# Patient Record
Sex: FEMALE | Race: WHITE | Employment: FULL TIME | URBAN - NONMETROPOLITAN AREA
[De-identification: names, ages, dates, MRNs, and addresses within clinical notes are randomized per-mention and may not be internally consistent; named-entity substitution may affect disease eponyms.]

---

## 2017-01-19 ENCOUNTER — HOSPITAL ENCOUNTER (OUTPATIENT)
Dept: OTHER | Age: 32
Discharge: HOME | End: 2017-01-19
Attending: CLINIC/CENTER | Admitting: CLINIC/CENTER

## 2017-01-21 LAB
CULTURE: NORMAL
REPORT STATUS: NORMAL
REQUEST PROBLEM: NORMAL
SPECIMEN: NORMAL

## 2017-03-06 ENCOUNTER — HOSPITAL ENCOUNTER (OUTPATIENT)
Dept: OTHER | Age: 32
Discharge: HOME | End: 2017-03-06
Attending: INTERNAL MEDICINE | Admitting: INTERNAL MEDICINE

## 2017-03-06 LAB
ALBUMIN SERPL-MCNC: 4.3 GM/DL (ref 3.4–5)
ALP BLD-CCNC: 58 IU/L (ref 40–129)
ALT SERPL-CCNC: 9 U/L (ref 10–40)
ANION GAP SERPL CALCULATED.3IONS-SCNC: 12 MMOL/L (ref 4–16)
AST SERPL-CCNC: 14 IU/L (ref 15–37)
BILIRUB SERPL-MCNC: 0.2 MG/DL (ref 0–1)
BUN BLDV-MCNC: 6 MG/DL (ref 6–23)
CALCIUM SERPL-MCNC: 8.8 MG/DL (ref 8.3–10.6)
CHLORIDE BLD-SCNC: 99 MMOL/L (ref 99–110)
CO2: 26 MMOL/L (ref 21–32)
CREAT SERPL-MCNC: 0.6 MG/DL (ref 0.6–1.1)
GFR AFRICAN AMERICAN: >60 ML/MIN/1.73M2
GFR NON-AFRICAN AMERICAN: >60 ML/MIN/1.73M2
GLUCOSE BLD-MCNC: 70 MG/DL (ref 70–140)
POTASSIUM SERPL-SCNC: 4 MMOL/L (ref 3.5–5.1)
SODIUM BLD-SCNC: 137 MMOL/L (ref 135–145)
T3 FREE: 2.6 PG/ML (ref 2.3–4.2)
T4 FREE: 1.4 NG/DL (ref 0.9–1.8)
TOTAL PROTEIN: 7.3 GM/DL (ref 6.4–8.2)
TSH HIGH SENSITIVITY: 0.85 UIU/ML (ref 0.27–4.2)

## 2017-03-08 LAB
ANTITHYROGLOBULIN AB: <0.9
T4 TOTAL: 9.7 UG/DL
THYROGLOBULIN BY LC-MS/MS, SERUM/PLASMA: ABNORMAL
THYROGLOBULIN: 0.3

## 2017-03-14 ENCOUNTER — HOSPITAL ENCOUNTER (OUTPATIENT)
Dept: ULTRASOUND IMAGING | Age: 32
Discharge: HOME | End: 2017-03-14
Attending: INTERNAL MEDICINE | Admitting: INTERNAL MEDICINE

## 2017-03-14 DIAGNOSIS — R59.0 INGUINAL LYMPHADENOPATHY: ICD-10-CM

## 2017-03-14 DIAGNOSIS — C73 MALIGNANT NEOPLASM OF THYROID GLAND (HCC): ICD-10-CM

## 2017-04-26 ENCOUNTER — HOSPITAL ENCOUNTER (OUTPATIENT)
Dept: ULTRASOUND IMAGING | Age: 32
Discharge: HOME | End: 2017-04-26
Attending: EMERGENCY MEDICINE | Admitting: EMERGENCY MEDICINE

## 2017-04-26 DIAGNOSIS — R10.11 ABDOMINAL PAIN, RIGHT UPPER QUADRANT: ICD-10-CM

## 2017-05-01 ENCOUNTER — HOSPITAL ENCOUNTER (OUTPATIENT)
Dept: OTHER | Age: 32
Discharge: HOME | End: 2017-05-01
Attending: RADIOLOGY | Admitting: RADIOLOGY

## 2017-05-01 LAB
GONADOTROPIN, CHORIONIC (HCG) QUANT: <0.5 UIU/ML
HCT VFR BLD CALC: 40.9 % (ref 37–47)
HEMOGLOBIN: 13.4 GM/DL (ref 12.5–16)
MCH RBC QN AUTO: 28.8 PG (ref 27–31)
MCHC RBC AUTO-ENTMCNC: 32.8 % (ref 32–36)
MCV RBC AUTO: 87.8 FL (ref 78–100)
PDW BLD-RTO: 15 % (ref 11.7–14.9)
PLATELET # BLD: 309 K/CU MM (ref 140–440)
PMV BLD AUTO: 11.5 FL (ref 7.5–11.1)
RBC # BLD: 4.66 M/CU MM (ref 4.2–5.4)
TSH HIGH SENSITIVITY: 118.6 UIU/ML (ref 0.27–4.2)
WBC # BLD: 8.2 K/CU MM (ref 4–10.5)

## 2017-05-02 LAB
ANTITHYROGLOBULIN AB: <0.9
THYROGLOBULIN BY LC-MS/MS, SERUM/PLASMA: NORMAL
THYROGLOBULIN: 2.9

## 2017-05-10 ENCOUNTER — HOSPITAL ENCOUNTER (OUTPATIENT)
Dept: NUCLEAR MEDICINE | Age: 32
Discharge: HOME | End: 2017-05-09
Attending: RADIOLOGY | Admitting: RADIOLOGY

## 2017-05-10 DIAGNOSIS — C73 MALIGNANT NEOPLASM OF THYROID GLAND (HCC): ICD-10-CM

## 2017-05-10 DIAGNOSIS — C73 THYROID CANCER (HCC): ICD-10-CM

## 2017-07-27 ENCOUNTER — HOSPITAL ENCOUNTER (OUTPATIENT)
Dept: OTHER | Age: 32
Discharge: HOME | End: 2017-07-27
Attending: RADIOLOGY | Admitting: RADIOLOGY

## 2017-07-30 LAB
Lab: NORMAL
TEST NAME: NORMAL

## 2017-12-19 ENCOUNTER — HOSPITAL ENCOUNTER (OUTPATIENT)
Dept: GENERAL RADIOLOGY | Age: 32
Discharge: HOME | End: 2017-12-19

## 2017-12-19 DIAGNOSIS — R05.9 COUGH: ICD-10-CM

## 2018-01-30 ENCOUNTER — HOSPITAL ENCOUNTER (OUTPATIENT)
Dept: CARDIAC REHAB | Age: 33
Discharge: HOME | End: 2018-01-30

## 2018-04-02 ENCOUNTER — HOSPITAL ENCOUNTER (OUTPATIENT)
Dept: OTHER | Age: 33
Discharge: HOME | End: 2018-04-02
Attending: INTERNAL MEDICINE | Admitting: INTERNAL MEDICINE

## 2018-04-02 LAB
ALBUMIN SERPL-MCNC: 4.5 GM/DL (ref 3.4–5)
ALP BLD-CCNC: 60 IU/L (ref 40–129)
ALT SERPL-CCNC: 11 U/L (ref 10–40)
ANION GAP SERPL CALCULATED.3IONS-SCNC: 7 MMOL/L (ref 4–16)
AST SERPL-CCNC: 18 IU/L (ref 15–37)
BILIRUB SERPL-MCNC: 0.1 MG/DL (ref 0–1)
BUN BLDV-MCNC: 8 MG/DL (ref 6–23)
CALCIUM SERPL-MCNC: 9.3 MG/DL (ref 8.3–10.6)
CHLORIDE BLD-SCNC: 102 MMOL/L (ref 99–110)
CO2: 32 MMOL/L (ref 21–32)
CREAT SERPL-MCNC: 0.6 MG/DL (ref 0.6–1.1)
DIFFERENTIAL TYPE: ABNORMAL
EOSINOPHILS ABSOLUTE: 0.5 K/CU MM
EOSINOPHILS RELATIVE PERCENT: 6 % (ref 0–3)
ERYTHROCYTE SEDIMENTATION RATE: 14 MM/HR (ref 0–20)
FERRITIN: 12 NG/ML (ref 15–150)
FOLATE: >20 NG/ML (ref 3.1–17.5)
GFR AFRICAN AMERICAN: >60 ML/MIN/1.73M2
GFR NON-AFRICAN AMERICAN: >60 ML/MIN/1.73M2
GLUCOSE BLD-MCNC: 90 MG/DL (ref 70–99)
HCT VFR BLD CALC: 40.3 % (ref 37–47)
HEMOGLOBIN: 13.4 GM/DL (ref 12.5–16)
IRON: 57 UG/DL (ref 37–145)
LACTATE DEHYDROGENASE: 161 IU/L (ref 120–246)
LYMPHOCYTES ABSOLUTE: 2.8 K/CU MM
LYMPHOCYTES RELATIVE PERCENT: 32 % (ref 24–44)
MCH RBC QN AUTO: 29.3 PG (ref 27–31)
MCHC RBC AUTO-ENTMCNC: 33.3 % (ref 32–36)
MCV RBC AUTO: 88 FL (ref 78–100)
MONOCYTES ABSOLUTE: 0.4 K/CU MM
MONOCYTES RELATIVE PERCENT: 5 % (ref 0–4)
PCT TRANSFERRIN: 14 % (ref 10–44)
PDW BLD-RTO: 12.8 % (ref 11.7–14.9)
PLATELET # BLD: 262 K/CU MM (ref 140–440)
PLT MORPHOLOGY: ABNORMAL
PMV BLD AUTO: 11.2 FL (ref 7.5–11.1)
POLYCHROMASIA: ABNORMAL
POTASSIUM SERPL-SCNC: 4.6 MMOL/L (ref 3.5–5.1)
RBC # BLD: 4.58 M/CU MM (ref 4.2–5.4)
RETICULOCYTE COUNT PCT: 0.9 % (ref 0.2–2.2)
SEGMENTED NEUTROPHILS ABSOLUTE COUNT: 4.9 K/CU MM
SEGMENTED NEUTROPHILS RELATIVE PERCENT: 57 % (ref 36–66)
SODIUM BLD-SCNC: 141 MMOL/L (ref 135–145)
TOTAL IRON BINDING CAPACITY: 403 UG/DL (ref 250–450)
TOTAL PROTEIN: 7.3 GM/DL (ref 6.4–8.2)
UNSATURATED IRON BINDING CAPACITY: 346 UG/DL (ref 110–370)
VITAMIN B-12: 414.4 PG/ML (ref 211–911)
WBC # BLD: 8.6 K/CU MM (ref 4–10.5)

## 2018-04-05 LAB — ANTI-NUCLEAR ANTIBODY (ANA): NORMAL

## 2018-04-23 ENCOUNTER — HOSPITAL ENCOUNTER (OUTPATIENT)
Dept: LAB | Age: 33
Discharge: HOME | End: 2018-04-23

## 2018-04-23 LAB — TSH HIGH SENSITIVITY: 2.29 UIU/ML (ref 0.27–4.2)

## 2018-06-27 ENCOUNTER — HOSPITAL ENCOUNTER (OUTPATIENT)
Dept: LAB | Age: 33
Discharge: HOME | End: 2018-06-27

## 2018-06-27 LAB
T4 FREE: 1.46 NG/DL (ref 0.9–1.8)
TSH HIGH SENSITIVITY: 0.69 UIU/ML (ref 0.27–4.2)

## 2018-07-23 ENCOUNTER — HOSPITAL ENCOUNTER (OUTPATIENT)
Dept: CARDIAC REHAB | Age: 33
Discharge: HOME | End: 2018-07-23

## 2018-08-20 ENCOUNTER — HOSPITAL ENCOUNTER (OUTPATIENT)
Dept: OTHER | Age: 33
Discharge: HOME | End: 2018-08-20
Attending: INTERNAL MEDICINE | Admitting: INTERNAL MEDICINE

## 2018-08-20 LAB
ALBUMIN SERPL-MCNC: 4.3 GM/DL (ref 3.4–5)
ALP BLD-CCNC: 55 IU/L (ref 40–129)
ALT SERPL-CCNC: 11 U/L (ref 10–40)
ANION GAP SERPL CALCULATED.3IONS-SCNC: 12 MMOL/L (ref 4–16)
AST SERPL-CCNC: 16 IU/L (ref 15–37)
BASOPHILS ABSOLUTE: 0.1 K/CU MM
BASOPHILS RELATIVE PERCENT: 1 % (ref 0–1)
BILIRUB SERPL-MCNC: 0.2 MG/DL (ref 0–1)
BUN BLDV-MCNC: 9 MG/DL (ref 6–23)
CALCIUM SERPL-MCNC: 9.4 MG/DL (ref 8.3–10.6)
CHLORIDE BLD-SCNC: 101 MMOL/L (ref 99–110)
CO2: 25 MMOL/L (ref 21–32)
CREAT SERPL-MCNC: 0.7 MG/DL (ref 0.6–1.1)
DIFFERENTIAL TYPE: ABNORMAL
EOSINOPHILS ABSOLUTE: 0.1 K/CU MM
EOSINOPHILS RELATIVE PERCENT: 1 % (ref 0–3)
FERRITIN: 8 NG/ML (ref 15–150)
GFR AFRICAN AMERICAN: >60 ML/MIN/1.73M2
GFR NON-AFRICAN AMERICAN: >60 ML/MIN/1.73M2
GLUCOSE BLD-MCNC: 50 MG/DL (ref 70–99)
HCT VFR BLD CALC: 40.5 % (ref 37–47)
HEMOGLOBIN: 12.6 GM/DL (ref 12.5–16)
IRON: 72 UG/DL (ref 37–145)
LYMPHOCYTES ABSOLUTE: 3.3 K/CU MM
LYMPHOCYTES RELATIVE PERCENT: 29 % (ref 24–44)
MCH RBC QN AUTO: 29.1 PG (ref 27–31)
MCHC RBC AUTO-ENTMCNC: 31.1 % (ref 32–36)
MCV RBC AUTO: 93.5 FL (ref 78–100)
MONOCYTES ABSOLUTE: 0.7 K/CU MM
MONOCYTES RELATIVE PERCENT: 6 % (ref 0–4)
MYELOCYTE PERCENT: 1 %
MYELOCYTES ABSOLUTE COUNT: 0.11 K/CU MM
PCT TRANSFERRIN: 18 % (ref 10–44)
PDW BLD-RTO: 12.9 % (ref 11.7–14.9)
PLATELET # BLD: 256 K/CU MM (ref 140–440)
PMV BLD AUTO: 11.9 FL (ref 7.5–11.1)
POTASSIUM SERPL-SCNC: 4.3 MMOL/L (ref 3.5–5.1)
RBC # BLD: 4.33 M/CU MM (ref 4.2–5.4)
RBC # BLD: ABNORMAL 10*6/UL
SEGMENTED NEUTROPHILS ABSOLUTE COUNT: 7.1 K/CU MM
SEGMENTED NEUTROPHILS RELATIVE PERCENT: 62 % (ref 36–66)
SODIUM BLD-SCNC: 138 MMOL/L (ref 135–145)
TOTAL IRON BINDING CAPACITY: 407 UG/DL (ref 250–450)
TOTAL PROTEIN: 6.8 GM/DL (ref 6.4–8.2)
UNSATURATED IRON BINDING CAPACITY: 335 UG/DL (ref 110–370)
WBC # BLD: 11.4 K/CU MM (ref 4–10.5)

## 2018-12-15 ENCOUNTER — APPOINTMENT (OUTPATIENT)
Dept: GENERAL RADIOLOGY | Age: 33
End: 2018-12-15
Payer: COMMERCIAL

## 2018-12-15 ENCOUNTER — HOSPITAL ENCOUNTER (EMERGENCY)
Age: 33
Discharge: HOME | End: 2018-12-15
Attending: EMERGENCY MEDICINE
Payer: COMMERCIAL

## 2018-12-15 VITALS
OXYGEN SATURATION: 100 % | DIASTOLIC BLOOD PRESSURE: 66 MMHG | WEIGHT: 115 LBS | HEART RATE: 80 BPM | BODY MASS INDEX: 21.71 KG/M2 | TEMPERATURE: 98.2 F | RESPIRATION RATE: 20 BRPM | HEIGHT: 61 IN | SYSTOLIC BLOOD PRESSURE: 151 MMHG

## 2018-12-15 DIAGNOSIS — R19.7 NAUSEA VOMITING AND DIARRHEA: Primary | ICD-10-CM

## 2018-12-15 DIAGNOSIS — R11.2 NAUSEA VOMITING AND DIARRHEA: Primary | ICD-10-CM

## 2018-12-15 DIAGNOSIS — R05.9 COUGH: ICD-10-CM

## 2018-12-15 LAB
ALBUMIN SERPL-MCNC: 4.3 GM/DL (ref 3.4–5)
ALP BLD-CCNC: 61 IU/L (ref 40–129)
ALT SERPL-CCNC: 8 U/L (ref 10–40)
ANION GAP SERPL CALCULATED.3IONS-SCNC: 14 MMOL/L (ref 4–16)
AST SERPL-CCNC: 19 IU/L (ref 15–37)
BACTERIA: NORMAL /HPF
BASOPHILS ABSOLUTE: 0.1 K/CU MM
BASOPHILS RELATIVE PERCENT: 0.5 % (ref 0–1)
BILIRUB SERPL-MCNC: 0.2 MG/DL (ref 0–1)
BILIRUBIN URINE: NEGATIVE MG/DL
BLOOD, URINE: NORMAL
BUN BLDV-MCNC: 8 MG/DL (ref 6–23)
CALCIUM SERPL-MCNC: 8.9 MG/DL (ref 8.3–10.6)
CAST TYPE: NEGATIVE /HPF
CHLORIDE BLD-SCNC: 102 MMOL/L (ref 99–110)
CLARITY: NORMAL
CO2: 25 MMOL/L (ref 21–32)
COLOR: NORMAL
CREAT SERPL-MCNC: 0.6 MG/DL (ref 0.6–1.1)
CRYSTAL TYPE: NEGATIVE /HPF
DIFFERENTIAL TYPE: ABNORMAL
EOSINOPHILS ABSOLUTE: 0.2 K/CU MM
EOSINOPHILS RELATIVE PERCENT: 2.3 % (ref 0–3)
EPITHELIAL CELLS, UA: NORMAL /HPF
GFR AFRICAN AMERICAN: >60 ML/MIN/1.73M2
GFR NON-AFRICAN AMERICAN: >60 ML/MIN/1.73M2
GLUCOSE BLD-MCNC: 76 MG/DL (ref 70–99)
GLUCOSE, URINE: NEGATIVE MG/DL
HCT VFR BLD CALC: 35 % (ref 37–47)
HEMOGLOBIN: 11.5 GM/DL (ref 12.5–16)
IMMATURE NEUTROPHIL %: 0.4 % (ref 0–0.43)
KETONES, URINE: NEGATIVE MG/DL
LEUKOCYTE ESTERASE, URINE: NEGATIVE
LIPASE: 29 IU/L (ref 13–60)
LYMPHOCYTES ABSOLUTE: 2.8 K/CU MM
LYMPHOCYTES RELATIVE PERCENT: 28.2 % (ref 24–44)
MCH RBC QN AUTO: 29 PG (ref 27–31)
MCHC RBC AUTO-ENTMCNC: 32.9 % (ref 32–36)
MCV RBC AUTO: 88.2 FL (ref 78–100)
MONOCYTES ABSOLUTE: 0.7 K/CU MM
MONOCYTES RELATIVE PERCENT: 6.8 % (ref 0–4)
NITRITE URINE, QUANTITATIVE: NEGATIVE
PDW BLD-RTO: 12.7 % (ref 11.7–14.9)
PH, URINE: 6
PLATELET # BLD: 181 K/CU MM (ref 140–440)
PMV BLD AUTO: 12.6 FL (ref 7.5–11.1)
POTASSIUM SERPL-SCNC: 4.1 MMOL/L (ref 3.5–5.1)
PREGNANCY, URINE: NEGATIVE
PROTEIN UA: NEGATIVE MG/DL
RBC # BLD: 3.97 M/CU MM (ref 4.2–5.4)
RBC URINE: NORMAL /HPF
SEGMENTED NEUTROPHILS ABSOLUTE COUNT: 6.1 K/CU MM
SEGMENTED NEUTROPHILS RELATIVE PERCENT: 61.8 % (ref 36–66)
SODIUM BLD-SCNC: 141 MMOL/L (ref 135–145)
SPECIFIC GRAVITY UA: 1.01
SPECIFIC GRAVITY, URINE: 1.01 (ref 1–1.03)
TOTAL IMMATURE NEUTOROPHIL: 0.04 K/CU MM
TOTAL PROTEIN: 7.2 GM/DL (ref 6.4–8.2)
UROBILINOGEN, URINE: 0.2 MG/DL
WBC # BLD: 9.9 K/CU MM (ref 4–10.5)
WBC UA: NEGATIVE /HPF

## 2018-12-15 PROCEDURE — 81025 URINE PREGNANCY TEST: CPT

## 2018-12-15 PROCEDURE — 80053 COMPREHEN METABOLIC PANEL: CPT

## 2018-12-15 PROCEDURE — 2580000003 HC RX 258: Performed by: EMERGENCY MEDICINE

## 2018-12-15 PROCEDURE — 83690 ASSAY OF LIPASE: CPT

## 2018-12-15 PROCEDURE — 96374 THER/PROPH/DIAG INJ IV PUSH: CPT

## 2018-12-15 PROCEDURE — 6360000002 HC RX W HCPCS: Performed by: EMERGENCY MEDICINE

## 2018-12-15 PROCEDURE — 71046 X-RAY EXAM CHEST 2 VIEWS: CPT

## 2018-12-15 PROCEDURE — 99284 EMERGENCY DEPT VISIT MOD MDM: CPT

## 2018-12-15 PROCEDURE — 85025 COMPLETE CBC W/AUTO DIFF WBC: CPT

## 2018-12-15 PROCEDURE — 81001 URINALYSIS AUTO W/SCOPE: CPT

## 2018-12-15 RX ORDER — FERROUS SULFATE 325(65) MG
325 TABLET ORAL
COMMUNITY
End: 2020-08-31 | Stop reason: SDUPTHER

## 2018-12-15 RX ORDER — ONDANSETRON 4 MG/1
4 TABLET, ORALLY DISINTEGRATING ORAL EVERY 8 HOURS PRN
Qty: 15 TABLET | Refills: 0 | Status: SHIPPED | OUTPATIENT
Start: 2018-12-15 | End: 2019-02-18

## 2018-12-15 RX ORDER — 0.9 % SODIUM CHLORIDE 0.9 %
1000 INTRAVENOUS SOLUTION INTRAVENOUS ONCE
Status: COMPLETED | OUTPATIENT
Start: 2018-12-15 | End: 2018-12-15

## 2018-12-15 RX ORDER — ONDANSETRON 2 MG/ML
4 INJECTION INTRAMUSCULAR; INTRAVENOUS EVERY 30 MIN PRN
Status: DISCONTINUED | OUTPATIENT
Start: 2018-12-15 | End: 2018-12-15 | Stop reason: HOSPADM

## 2018-12-15 RX ADMIN — SODIUM CHLORIDE 1000 ML: 9 INJECTION, SOLUTION INTRAVENOUS at 16:57

## 2018-12-15 RX ADMIN — ONDANSETRON 4 MG: 2 INJECTION INTRAMUSCULAR; INTRAVENOUS at 16:57

## 2018-12-15 ASSESSMENT — PAIN SCALES - GENERAL: PAINLEVEL_OUTOF10: 5

## 2018-12-15 ASSESSMENT — PAIN DESCRIPTION - PAIN TYPE: TYPE: ACUTE PAIN

## 2018-12-15 NOTE — ED PROVIDER NOTES
CHOLECYSTECTOMY      CYSTOSCOPY  2012    LAPAROSCOPY      x5- last one 2009    MYRINGOTOMY AND TYMPANOSTOMY TUBE PLACEMENT      as a child    THYROIDECTOMY  10/17/2016    total thyroidectomy     TUBAL LIGATION  2016    UTERINE SUSPENSION  2014     Family History   Problem Relation Age of Onset    Arthritis Mother     High Blood Pressure Mother     Arthritis Father     Mental Retardation Paternal Uncle     Arthritis Maternal Grandmother     Diabetes Maternal Grandmother     High Blood Pressure Maternal Grandmother     Arthritis Maternal Grandfather     Diabetes Maternal Grandfather     High Blood Pressure Maternal Grandfather     Arthritis Paternal Grandmother     Arthritis Paternal Grandfather      Social History     Social History    Marital status:      Spouse name: N/A    Number of children: N/A    Years of education: N/A     Occupational History    Not on file. Social History Main Topics    Smoking status: Current Every Day Smoker     Packs/day: 0.50     Years: 12.00     Types: Cigarettes    Smokeless tobacco: Never Used      Comment: \"trying to quit\"    Alcohol use No    Drug use: No    Sexual activity: Yes     Partners: Male     Other Topics Concern    Not on file     Social History Narrative    No narrative on file     No current facility-administered medications for this encounter.       Current Outpatient Prescriptions   Medication Sig Dispense Refill    ferrous sulfate 325 (65 Fe) MG tablet Take 325 mg by mouth daily (with breakfast)      levothyroxine (SYNTHROID) 112 MCG tablet Take 112 mcg by mouth Daily      Norgestimate-Eth Estradiol (SPRINTEC 28 PO) Take by mouth       Allergies   Allergen Reactions    Latex Swelling and Rash     Swelling and rash       Physical Exam:  ED TRIAGE VITALS  BP: (!) 151/66, Temp: 98.2 °F (36.8 °C), Pulse: 80, Resp: 20, SpO2: 100 %    GENERAL: Appears stated age, awake and alert, no acute distress, non-toxic appearing  HEENT: NC /

## 2018-12-21 ENCOUNTER — HOSPITAL ENCOUNTER (OUTPATIENT)
Age: 33
Setting detail: SPECIMEN
Discharge: HOME | End: 2018-12-21
Payer: COMMERCIAL

## 2018-12-21 LAB
ALBUMIN SERPL-MCNC: 4.6 GM/DL (ref 3.4–5)
ALP BLD-CCNC: 56 IU/L (ref 40–128)
ALT SERPL-CCNC: 15 U/L (ref 10–40)
ANION GAP SERPL CALCULATED.3IONS-SCNC: 12 MMOL/L (ref 4–16)
AST SERPL-CCNC: 19 IU/L (ref 15–37)
BILIRUB SERPL-MCNC: 0.2 MG/DL (ref 0–1)
BUN BLDV-MCNC: 7 MG/DL (ref 6–23)
CALCIUM SERPL-MCNC: 9.1 MG/DL (ref 8.3–10.6)
CHLORIDE BLD-SCNC: 101 MMOL/L (ref 99–110)
CO2: 25 MMOL/L (ref 21–32)
CREAT SERPL-MCNC: 0.7 MG/DL (ref 0.6–1.1)
FERRITIN: 13 NG/ML (ref 15–150)
GFR AFRICAN AMERICAN: >60 ML/MIN/1.73M2
GFR NON-AFRICAN AMERICAN: >60 ML/MIN/1.73M2
GLUCOSE BLD-MCNC: 117 MG/DL (ref 70–99)
IRON: 91 UG/DL (ref 37–145)
PCT TRANSFERRIN: 26 % (ref 10–44)
POTASSIUM SERPL-SCNC: 4.7 MMOL/L (ref 3.5–5.1)
SODIUM BLD-SCNC: 138 MMOL/L (ref 135–145)
TOTAL IRON BINDING CAPACITY: 345 UG/DL (ref 250–450)
TOTAL PROTEIN: 7.2 GM/DL (ref 6.4–8.2)
UNSATURATED IRON BINDING CAPACITY: 254 UG/DL (ref 110–370)

## 2018-12-21 PROCEDURE — 80053 COMPREHEN METABOLIC PANEL: CPT

## 2018-12-21 PROCEDURE — 83540 ASSAY OF IRON: CPT

## 2018-12-21 PROCEDURE — 82728 ASSAY OF FERRITIN: CPT

## 2018-12-21 PROCEDURE — 83550 IRON BINDING TEST: CPT

## 2019-02-18 ENCOUNTER — HOSPITAL ENCOUNTER (OUTPATIENT)
Age: 34
Setting detail: OBSERVATION
Discharge: HOME | End: 2019-02-20
Attending: EMERGENCY MEDICINE | Admitting: HOSPITALIST
Payer: COMMERCIAL

## 2019-02-18 DIAGNOSIS — K52.9 GASTROENTERITIS: Primary | ICD-10-CM

## 2019-02-18 DIAGNOSIS — E87.29 HIGH ANION GAP METABOLIC ACIDOSIS: ICD-10-CM

## 2019-02-18 PROBLEM — A08.4 VIRAL GASTROENTERITIS: Status: ACTIVE | Noted: 2019-02-18

## 2019-02-18 LAB
ALBUMIN SERPL-MCNC: 4 GM/DL (ref 3.4–5)
ALP BLD-CCNC: 50 IU/L (ref 40–129)
ALT SERPL-CCNC: 9 U/L (ref 10–40)
ANION GAP SERPL CALCULATED.3IONS-SCNC: 25 MMOL/L (ref 4–16)
AST SERPL-CCNC: 13 IU/L (ref 15–37)
BASOPHILS ABSOLUTE: 0 K/CU MM
BASOPHILS ABSOLUTE: 0.1 K/CU MM
BASOPHILS RELATIVE PERCENT: 0.2 % (ref 0–1)
BASOPHILS RELATIVE PERCENT: 0.2 % (ref 0–1)
BILIRUB SERPL-MCNC: 0.5 MG/DL (ref 0–1)
BUN BLDV-MCNC: 12 MG/DL (ref 6–23)
CALCIUM SERPL-MCNC: 8.9 MG/DL (ref 8.3–10.6)
CHLORIDE BLD-SCNC: 99 MMOL/L (ref 99–110)
CO2: 15 MMOL/L (ref 21–32)
CREAT SERPL-MCNC: 0.6 MG/DL (ref 0.6–1.1)
DIFFERENTIAL TYPE: ABNORMAL
DIFFERENTIAL TYPE: ABNORMAL
EOSINOPHILS ABSOLUTE: 0 K/CU MM
EOSINOPHILS ABSOLUTE: 0 K/CU MM
EOSINOPHILS RELATIVE PERCENT: 0.1 % (ref 0–3)
EOSINOPHILS RELATIVE PERCENT: 0.2 % (ref 0–3)
GFR AFRICAN AMERICAN: >60 ML/MIN/1.73M2
GFR NON-AFRICAN AMERICAN: >60 ML/MIN/1.73M2
GLUCOSE BLD-MCNC: 138 MG/DL (ref 70–99)
HCT VFR BLD CALC: 38.1 % (ref 37–47)
HCT VFR BLD CALC: 41.5 % (ref 37–47)
HEMOGLOBIN: 12.7 GM/DL (ref 12.5–16)
HEMOGLOBIN: 13.3 GM/DL (ref 12.5–16)
IMMATURE NEUTROPHIL %: 0.4 % (ref 0–0.43)
IMMATURE NEUTROPHIL %: 0.6 % (ref 0–0.43)
LIPASE: 38 IU/L (ref 13–60)
LYMPHOCYTES ABSOLUTE: 0.9 K/CU MM
LYMPHOCYTES ABSOLUTE: 1.3 K/CU MM
LYMPHOCYTES RELATIVE PERCENT: 4.3 % (ref 24–44)
LYMPHOCYTES RELATIVE PERCENT: 5.9 % (ref 24–44)
MCH RBC QN AUTO: 28.5 PG (ref 27–31)
MCH RBC QN AUTO: 29.1 PG (ref 27–31)
MCHC RBC AUTO-ENTMCNC: 32 % (ref 32–36)
MCHC RBC AUTO-ENTMCNC: 33.3 % (ref 32–36)
MCV RBC AUTO: 87.4 FL (ref 78–100)
MCV RBC AUTO: 88.9 FL (ref 78–100)
MONOCYTES ABSOLUTE: 0.3 K/CU MM
MONOCYTES ABSOLUTE: 0.7 K/CU MM
MONOCYTES RELATIVE PERCENT: 1.5 % (ref 0–4)
MONOCYTES RELATIVE PERCENT: 3.2 % (ref 0–4)
PDW BLD-RTO: 13.1 % (ref 11.7–14.9)
PDW BLD-RTO: 13.2 % (ref 11.7–14.9)
PLATELET # BLD: 242 K/CU MM (ref 140–440)
PLATELET # BLD: 287 K/CU MM (ref 140–440)
PMV BLD AUTO: 10.9 FL (ref 7.5–11.1)
PMV BLD AUTO: 11.7 FL (ref 7.5–11.1)
POTASSIUM SERPL-SCNC: 4.3 MMOL/L (ref 3.5–5.1)
RBC # BLD: 4.36 M/CU MM (ref 4.2–5.4)
RBC # BLD: 4.67 M/CU MM (ref 4.2–5.4)
SEGMENTED NEUTROPHILS ABSOLUTE COUNT: 18.6 K/CU MM
SEGMENTED NEUTROPHILS ABSOLUTE COUNT: 19.4 K/CU MM
SEGMENTED NEUTROPHILS RELATIVE PERCENT: 89.9 % (ref 36–66)
SEGMENTED NEUTROPHILS RELATIVE PERCENT: 93.5 % (ref 36–66)
SODIUM BLD-SCNC: 139 MMOL/L (ref 135–145)
TOTAL IMMATURE NEUTOROPHIL: 0.07 K/CU MM
TOTAL IMMATURE NEUTOROPHIL: 0.13 K/CU MM
TOTAL PROTEIN: 7.6 GM/DL (ref 6.4–8.2)
WBC # BLD: 19.9 K/CU MM (ref 4–10.5)
WBC # BLD: 21.6 K/CU MM (ref 4–10.5)

## 2019-02-18 PROCEDURE — 6360000002 HC RX W HCPCS: Performed by: EMERGENCY MEDICINE

## 2019-02-18 PROCEDURE — 80053 COMPREHEN METABOLIC PANEL: CPT

## 2019-02-18 PROCEDURE — 85025 COMPLETE CBC W/AUTO DIFF WBC: CPT

## 2019-02-18 PROCEDURE — 99284 EMERGENCY DEPT VISIT MOD MDM: CPT

## 2019-02-18 PROCEDURE — 83690 ASSAY OF LIPASE: CPT

## 2019-02-18 PROCEDURE — G0378 HOSPITAL OBSERVATION PER HR: HCPCS

## 2019-02-18 PROCEDURE — 96375 TX/PRO/DX INJ NEW DRUG ADDON: CPT

## 2019-02-18 PROCEDURE — 2580000003 HC RX 258: Performed by: EMERGENCY MEDICINE

## 2019-02-18 PROCEDURE — 2580000003 HC RX 258: Performed by: HOSPITALIST

## 2019-02-18 PROCEDURE — 96376 TX/PRO/DX INJ SAME DRUG ADON: CPT

## 2019-02-18 PROCEDURE — 96374 THER/PROPH/DIAG INJ IV PUSH: CPT

## 2019-02-18 PROCEDURE — 36415 COLL VENOUS BLD VENIPUNCTURE: CPT

## 2019-02-18 RX ORDER — NICOTINE 21 MG/24HR
1 PATCH, TRANSDERMAL 24 HOURS TRANSDERMAL DAILY
Status: DISCONTINUED | OUTPATIENT
Start: 2019-02-19 | End: 2019-02-20 | Stop reason: HOSPADM

## 2019-02-18 RX ORDER — SODIUM CHLORIDE, SODIUM LACTATE, POTASSIUM CHLORIDE, CALCIUM CHLORIDE 600; 310; 30; 20 MG/100ML; MG/100ML; MG/100ML; MG/100ML
INJECTION, SOLUTION INTRAVENOUS CONTINUOUS
Status: DISCONTINUED | OUTPATIENT
Start: 2019-02-18 | End: 2019-02-20 | Stop reason: HOSPADM

## 2019-02-18 RX ORDER — SODIUM CHLORIDE, SODIUM LACTATE, POTASSIUM CHLORIDE, CALCIUM CHLORIDE 600; 310; 30; 20 MG/100ML; MG/100ML; MG/100ML; MG/100ML
INJECTION, SOLUTION INTRAVENOUS ONCE
Status: COMPLETED | OUTPATIENT
Start: 2019-02-18 | End: 2019-02-18

## 2019-02-18 RX ORDER — ONDANSETRON 4 MG/1
4 TABLET, ORALLY DISINTEGRATING ORAL EVERY 6 HOURS PRN
Status: DISCONTINUED | OUTPATIENT
Start: 2019-02-18 | End: 2019-02-20 | Stop reason: HOSPADM

## 2019-02-18 RX ORDER — POTASSIUM CHLORIDE 7.45 MG/ML
10 INJECTION INTRAVENOUS
Status: DISCONTINUED | OUTPATIENT
Start: 2019-02-18 | End: 2019-02-18

## 2019-02-18 RX ORDER — METOCLOPRAMIDE HYDROCHLORIDE 5 MG/ML
10 INJECTION INTRAMUSCULAR; INTRAVENOUS ONCE
Status: COMPLETED | OUTPATIENT
Start: 2019-02-18 | End: 2019-02-18

## 2019-02-18 RX ORDER — ONDANSETRON 2 MG/ML
4 INJECTION INTRAMUSCULAR; INTRAVENOUS EVERY 6 HOURS PRN
Status: DISCONTINUED | OUTPATIENT
Start: 2019-02-18 | End: 2019-02-20 | Stop reason: HOSPADM

## 2019-02-18 RX ADMIN — SODIUM CHLORIDE, POTASSIUM CHLORIDE, SODIUM LACTATE AND CALCIUM CHLORIDE: 600; 310; 30; 20 INJECTION, SOLUTION INTRAVENOUS at 17:23

## 2019-02-18 RX ADMIN — SODIUM CHLORIDE, POTASSIUM CHLORIDE, SODIUM LACTATE AND CALCIUM CHLORIDE: 600; 310; 30; 20 INJECTION, SOLUTION INTRAVENOUS at 14:06

## 2019-02-18 RX ADMIN — METOCLOPRAMIDE 10 MG: 5 INJECTION, SOLUTION INTRAMUSCULAR; INTRAVENOUS at 14:07

## 2019-02-18 RX ADMIN — ONDANSETRON 4 MG: 2 INJECTION INTRAMUSCULAR; INTRAVENOUS at 14:07

## 2019-02-18 RX ADMIN — SODIUM CHLORIDE, POTASSIUM CHLORIDE, SODIUM LACTATE AND CALCIUM CHLORIDE 1000 ML: 600; 310; 30; 20 INJECTION, SOLUTION INTRAVENOUS at 14:55

## 2019-02-18 RX ADMIN — ONDANSETRON 4 MG: 2 INJECTION INTRAMUSCULAR; INTRAVENOUS at 17:22

## 2019-02-18 ASSESSMENT — ENCOUNTER SYMPTOMS
VOMITING: 1
NAUSEA: 1
DIARRHEA: 1
ABDOMINAL PAIN: 1

## 2019-02-18 ASSESSMENT — PAIN DESCRIPTION - FREQUENCY
FREQUENCY: INTERMITTENT
FREQUENCY: INTERMITTENT

## 2019-02-18 ASSESSMENT — PAIN DESCRIPTION - LOCATION
LOCATION: ABDOMEN
LOCATION: ABDOMEN

## 2019-02-18 ASSESSMENT — PAIN SCALES - GENERAL
PAINLEVEL_OUTOF10: 0
PAINLEVEL_OUTOF10: 7
PAINLEVEL_OUTOF10: 10

## 2019-02-18 ASSESSMENT — PAIN DESCRIPTION - DESCRIPTORS
DESCRIPTORS: STABBING
DESCRIPTORS: STABBING

## 2019-02-19 ENCOUNTER — APPOINTMENT (OUTPATIENT)
Dept: CT IMAGING | Age: 34
End: 2019-02-19
Payer: COMMERCIAL

## 2019-02-19 LAB
ANION GAP SERPL CALCULATED.3IONS-SCNC: 12 MMOL/L (ref 4–16)
BUN BLDV-MCNC: 7 MG/DL (ref 6–23)
CALCIUM SERPL-MCNC: 8 MG/DL (ref 8.3–10.6)
CHLORIDE BLD-SCNC: 102 MMOL/L (ref 99–110)
CO2: 23 MMOL/L (ref 21–32)
CREAT SERPL-MCNC: 0.6 MG/DL (ref 0.6–1.1)
GFR AFRICAN AMERICAN: >60 ML/MIN/1.73M2
GFR NON-AFRICAN AMERICAN: >60 ML/MIN/1.73M2
GLUCOSE BLD-MCNC: 93 MG/DL (ref 70–99)
POTASSIUM SERPL-SCNC: 3.6 MMOL/L (ref 3.5–5.1)
SODIUM BLD-SCNC: 137 MMOL/L (ref 135–145)

## 2019-02-19 PROCEDURE — 96365 THER/PROPH/DIAG IV INF INIT: CPT

## 2019-02-19 PROCEDURE — 87329 GIARDIA AG IA: CPT

## 2019-02-19 PROCEDURE — 96375 TX/PRO/DX INJ NEW DRUG ADDON: CPT

## 2019-02-19 PROCEDURE — 2500000003 HC RX 250 WO HCPCS: Performed by: FAMILY MEDICINE

## 2019-02-19 PROCEDURE — 2580000003 HC RX 258: Performed by: HOSPITALIST

## 2019-02-19 PROCEDURE — C9113 INJ PANTOPRAZOLE SODIUM, VIA: HCPCS | Performed by: FAMILY MEDICINE

## 2019-02-19 PROCEDURE — G0378 HOSPITAL OBSERVATION PER HR: HCPCS

## 2019-02-19 PROCEDURE — 6360000004 HC RX CONTRAST MEDICATION: Performed by: HOSPITALIST

## 2019-02-19 PROCEDURE — 96367 TX/PROPH/DG ADDL SEQ IV INF: CPT

## 2019-02-19 PROCEDURE — 6360000002 HC RX W HCPCS: Performed by: FAMILY MEDICINE

## 2019-02-19 PROCEDURE — 80048 BASIC METABOLIC PNL TOTAL CA: CPT

## 2019-02-19 PROCEDURE — 6360000002 HC RX W HCPCS: Performed by: EMERGENCY MEDICINE

## 2019-02-19 PROCEDURE — 87324 CLOSTRIDIUM AG IA: CPT

## 2019-02-19 PROCEDURE — 87328 CRYPTOSPORIDIUM AG IA: CPT

## 2019-02-19 PROCEDURE — 96376 TX/PRO/DX INJ SAME DRUG ADON: CPT

## 2019-02-19 PROCEDURE — 74177 CT ABD & PELVIS W/CONTRAST: CPT

## 2019-02-19 PROCEDURE — 6370000000 HC RX 637 (ALT 250 FOR IP): Performed by: FAMILY MEDICINE

## 2019-02-19 PROCEDURE — 36415 COLL VENOUS BLD VENIPUNCTURE: CPT

## 2019-02-19 PROCEDURE — 83630 LACTOFERRIN FECAL (QUAL): CPT

## 2019-02-19 PROCEDURE — 87425 ROTAVIRUS AG IA: CPT

## 2019-02-19 RX ORDER — PROMETHAZINE HYDROCHLORIDE 25 MG/ML
12.5 INJECTION, SOLUTION INTRAMUSCULAR; INTRAVENOUS EVERY 6 HOURS PRN
Status: DISCONTINUED | OUTPATIENT
Start: 2019-02-19 | End: 2019-02-20 | Stop reason: HOSPADM

## 2019-02-19 RX ORDER — CIPROFLOXACIN 2 MG/ML
400 INJECTION, SOLUTION INTRAVENOUS EVERY 12 HOURS
Status: DISCONTINUED | OUTPATIENT
Start: 2019-02-19 | End: 2019-02-20 | Stop reason: HOSPADM

## 2019-02-19 RX ORDER — PANTOPRAZOLE SODIUM 40 MG/10ML
40 INJECTION, POWDER, LYOPHILIZED, FOR SOLUTION INTRAVENOUS DAILY
Status: DISCONTINUED | OUTPATIENT
Start: 2019-02-19 | End: 2019-02-20 | Stop reason: HOSPADM

## 2019-02-19 RX ORDER — SIMETHICONE 80 MG
80 TABLET,CHEWABLE ORAL EVERY 6 HOURS PRN
Status: DISCONTINUED | OUTPATIENT
Start: 2019-02-19 | End: 2019-02-20 | Stop reason: HOSPADM

## 2019-02-19 RX ADMIN — PANTOPRAZOLE SODIUM 40 MG: 40 INJECTION, POWDER, FOR SOLUTION INTRAVENOUS at 16:23

## 2019-02-19 RX ADMIN — ONDANSETRON 4 MG: 2 INJECTION INTRAMUSCULAR; INTRAVENOUS at 07:37

## 2019-02-19 RX ADMIN — ONDANSETRON 4 MG: 2 INJECTION INTRAMUSCULAR; INTRAVENOUS at 00:07

## 2019-02-19 RX ADMIN — SODIUM CHLORIDE, POTASSIUM CHLORIDE, SODIUM LACTATE AND CALCIUM CHLORIDE: 600; 310; 30; 20 INJECTION, SOLUTION INTRAVENOUS at 01:58

## 2019-02-19 RX ADMIN — SODIUM CHLORIDE, POTASSIUM CHLORIDE, SODIUM LACTATE AND CALCIUM CHLORIDE: 600; 310; 30; 20 INJECTION, SOLUTION INTRAVENOUS at 20:25

## 2019-02-19 RX ADMIN — SIMETHICONE CHEW TAB 80 MG 80 MG: 80 TABLET ORAL at 09:42

## 2019-02-19 RX ADMIN — SODIUM CHLORIDE, POTASSIUM CHLORIDE, SODIUM LACTATE AND CALCIUM CHLORIDE: 600; 310; 30; 20 INJECTION, SOLUTION INTRAVENOUS at 09:42

## 2019-02-19 RX ADMIN — PROMETHAZINE HYDROCHLORIDE 12.5 MG: 25 INJECTION INTRAMUSCULAR; INTRAVENOUS at 17:06

## 2019-02-19 RX ADMIN — CIPROFLOXACIN 400 MG: 2 INJECTION, SOLUTION INTRAVENOUS at 18:34

## 2019-02-19 RX ADMIN — SIMETHICONE CHEW TAB 80 MG 80 MG: 80 TABLET ORAL at 16:23

## 2019-02-19 RX ADMIN — ONDANSETRON 4 MG: 2 INJECTION INTRAMUSCULAR; INTRAVENOUS at 13:56

## 2019-02-19 RX ADMIN — ONDANSETRON 4 MG: 2 INJECTION INTRAMUSCULAR; INTRAVENOUS at 20:19

## 2019-02-19 RX ADMIN — METRONIDAZOLE 500 MG: 500 INJECTION, SOLUTION INTRAVENOUS at 16:23

## 2019-02-19 RX ADMIN — IOPAMIDOL 75 ML: 755 INJECTION, SOLUTION INTRAVENOUS at 13:13

## 2019-02-19 RX ADMIN — PROMETHAZINE HYDROCHLORIDE 12.5 MG: 25 INJECTION INTRAMUSCULAR; INTRAVENOUS at 10:49

## 2019-02-19 ASSESSMENT — PAIN SCALES - GENERAL
PAINLEVEL_OUTOF10: 3
PAINLEVEL_OUTOF10: 4
PAINLEVEL_OUTOF10: 3
PAINLEVEL_OUTOF10: 3
PAINLEVEL_OUTOF10: 0
PAINLEVEL_OUTOF10: 3
PAINLEVEL_OUTOF10: 3
PAINLEVEL_OUTOF10: 2

## 2019-02-19 ASSESSMENT — PAIN DESCRIPTION - LOCATION
LOCATION: ABDOMEN
LOCATION: ABDOMEN

## 2019-02-19 ASSESSMENT — PAIN DESCRIPTION - DESCRIPTORS
DESCRIPTORS: CRAMPING
DESCRIPTORS: CRAMPING

## 2019-02-19 ASSESSMENT — PAIN DESCRIPTION - FREQUENCY
FREQUENCY: INTERMITTENT
FREQUENCY: INTERMITTENT

## 2019-02-20 VITALS
DIASTOLIC BLOOD PRESSURE: 62 MMHG | TEMPERATURE: 97.8 F | BODY MASS INDEX: 21.39 KG/M2 | WEIGHT: 125.3 LBS | SYSTOLIC BLOOD PRESSURE: 130 MMHG | HEART RATE: 50 BPM | OXYGEN SATURATION: 99 % | RESPIRATION RATE: 16 BRPM | HEIGHT: 64 IN

## 2019-02-20 PROBLEM — K52.9 GASTROENTERITIS: Status: RESOLVED | Noted: 2019-02-20 | Resolved: 2019-02-20

## 2019-02-20 PROBLEM — A08.4 VIRAL GASTROENTERITIS: Status: RESOLVED | Noted: 2019-02-18 | Resolved: 2019-02-20

## 2019-02-20 PROBLEM — K52.9 GASTROENTERITIS: Status: ACTIVE | Noted: 2019-02-20

## 2019-02-20 LAB
ANION GAP SERPL CALCULATED.3IONS-SCNC: 4 MMOL/L (ref 4–16)
BUN BLDV-MCNC: 6 MG/DL (ref 6–23)
CALCIUM SERPL-MCNC: 8 MG/DL (ref 8.3–10.6)
CHLORIDE BLD-SCNC: 100 MMOL/L (ref 99–110)
CO2: 34 MMOL/L (ref 21–32)
CREAT SERPL-MCNC: 0.6 MG/DL (ref 0.6–1.1)
CRYPTOSPORIDIUM ANTIGEN: NEGATIVE
GFR AFRICAN AMERICAN: >60 ML/MIN/1.73M2
GFR NON-AFRICAN AMERICAN: >60 ML/MIN/1.73M2
GIARDIA ANTIGEN STOOL: NEGATIVE
GLUCOSE BLD-MCNC: 86 MG/DL (ref 70–99)
HCT VFR BLD CALC: 34.7 % (ref 37–47)
HEMOGLOBIN: 11 GM/DL (ref 12.5–16)
HIGH SENSITIVE C-REACTIVE PROTEIN: 19.9 MG/L
LACTOFERRIN, QUAL: POSITIVE
MCH RBC QN AUTO: 28.5 PG (ref 27–31)
MCHC RBC AUTO-ENTMCNC: 31.7 % (ref 32–36)
MCV RBC AUTO: 89.9 FL (ref 78–100)
PDW BLD-RTO: 13.2 % (ref 11.7–14.9)
PLATELET # BLD: 226 K/CU MM (ref 140–440)
PMV BLD AUTO: 11 FL (ref 7.5–11.1)
POTASSIUM SERPL-SCNC: 3.2 MMOL/L (ref 3.5–5.1)
RBC # BLD: 3.86 M/CU MM (ref 4.2–5.4)
REASON FOR REJECTION: NORMAL
REJECTED TEST: NORMAL
ROTAVIRUS ANTIGEN: NEGATIVE
SODIUM BLD-SCNC: 138 MMOL/L (ref 135–145)
SOURCE: NORMAL
WBC # BLD: 6.9 K/CU MM (ref 4–10.5)

## 2019-02-20 PROCEDURE — 80048 BASIC METABOLIC PNL TOTAL CA: CPT

## 2019-02-20 PROCEDURE — 85027 COMPLETE CBC AUTOMATED: CPT

## 2019-02-20 PROCEDURE — 6360000002 HC RX W HCPCS: Performed by: EMERGENCY MEDICINE

## 2019-02-20 PROCEDURE — 2580000003 HC RX 258: Performed by: HOSPITALIST

## 2019-02-20 PROCEDURE — 6360000002 HC RX W HCPCS: Performed by: FAMILY MEDICINE

## 2019-02-20 PROCEDURE — 6370000000 HC RX 637 (ALT 250 FOR IP): Performed by: HOSPITALIST

## 2019-02-20 PROCEDURE — 36415 COLL VENOUS BLD VENIPUNCTURE: CPT

## 2019-02-20 PROCEDURE — 86141 C-REACTIVE PROTEIN HS: CPT

## 2019-02-20 PROCEDURE — G0378 HOSPITAL OBSERVATION PER HR: HCPCS

## 2019-02-20 PROCEDURE — 6370000000 HC RX 637 (ALT 250 FOR IP): Performed by: FAMILY MEDICINE

## 2019-02-20 PROCEDURE — 2500000003 HC RX 250 WO HCPCS: Performed by: FAMILY MEDICINE

## 2019-02-20 PROCEDURE — 96366 THER/PROPH/DIAG IV INF ADDON: CPT

## 2019-02-20 PROCEDURE — 96376 TX/PRO/DX INJ SAME DRUG ADON: CPT

## 2019-02-20 RX ORDER — FLUCONAZOLE 200 MG/1
200 TABLET ORAL DAILY
Qty: 3 TABLET | Refills: 0 | Status: SHIPPED | OUTPATIENT
Start: 2019-02-20 | End: 2019-02-23

## 2019-02-20 RX ORDER — SIMETHICONE 80 MG
80 TABLET,CHEWABLE ORAL EVERY 6 HOURS PRN
Qty: 180 TABLET | Refills: 0 | COMMUNITY
Start: 2019-02-20 | End: 2019-03-12 | Stop reason: ALTCHOICE

## 2019-02-20 RX ORDER — METRONIDAZOLE 500 MG/1
500 TABLET ORAL 3 TIMES DAILY
Qty: 30 TABLET | Refills: 0 | Status: SHIPPED | OUTPATIENT
Start: 2019-02-20 | End: 2019-03-02

## 2019-02-20 RX ORDER — CIPROFLOXACIN 500 MG/1
500 TABLET, FILM COATED ORAL 2 TIMES DAILY
Qty: 20 TABLET | Refills: 0 | Status: SHIPPED | OUTPATIENT
Start: 2019-02-20 | End: 2019-03-02

## 2019-02-20 RX ORDER — ONDANSETRON 4 MG/1
4 TABLET, ORALLY DISINTEGRATING ORAL EVERY 6 HOURS PRN
Qty: 20 TABLET | Refills: 0 | Status: SHIPPED | OUTPATIENT
Start: 2019-02-20 | End: 2019-03-12 | Stop reason: ALTCHOICE

## 2019-02-20 RX ADMIN — PROMETHAZINE HYDROCHLORIDE 12.5 MG: 25 INJECTION INTRAMUSCULAR; INTRAVENOUS at 05:44

## 2019-02-20 RX ADMIN — SODIUM CHLORIDE, POTASSIUM CHLORIDE, SODIUM LACTATE AND CALCIUM CHLORIDE: 600; 310; 30; 20 INJECTION, SOLUTION INTRAVENOUS at 04:09

## 2019-02-20 RX ADMIN — CIPROFLOXACIN 400 MG: 2 INJECTION, SOLUTION INTRAVENOUS at 05:44

## 2019-02-20 RX ADMIN — SIMETHICONE CHEW TAB 80 MG 80 MG: 80 TABLET ORAL at 00:01

## 2019-02-20 RX ADMIN — ONDANSETRON 4 MG: 4 TABLET, ORALLY DISINTEGRATING ORAL at 09:55

## 2019-02-20 RX ADMIN — PROMETHAZINE HYDROCHLORIDE 12.5 MG: 25 INJECTION INTRAMUSCULAR; INTRAVENOUS at 00:01

## 2019-02-20 RX ADMIN — METRONIDAZOLE 500 MG: 500 INJECTION, SOLUTION INTRAVENOUS at 00:01

## 2019-02-20 RX ADMIN — ONDANSETRON 4 MG: 2 INJECTION INTRAMUSCULAR; INTRAVENOUS at 03:03

## 2019-02-20 RX ADMIN — SIMETHICONE CHEW TAB 80 MG 80 MG: 80 TABLET ORAL at 05:44

## 2019-03-12 ENCOUNTER — TELEPHONE (OUTPATIENT)
Dept: FAMILY MEDICINE CLINIC | Age: 34
End: 2019-03-12

## 2019-03-12 ENCOUNTER — OFFICE VISIT (OUTPATIENT)
Dept: FAMILY MEDICINE CLINIC | Age: 34
End: 2019-03-12
Payer: COMMERCIAL

## 2019-03-12 VITALS
RESPIRATION RATE: 16 BRPM | HEART RATE: 80 BPM | DIASTOLIC BLOOD PRESSURE: 84 MMHG | HEIGHT: 62 IN | WEIGHT: 123.4 LBS | SYSTOLIC BLOOD PRESSURE: 124 MMHG | BODY MASS INDEX: 22.71 KG/M2

## 2019-03-12 DIAGNOSIS — E89.0 POSTOPERATIVE HYPOTHYROIDISM: ICD-10-CM

## 2019-03-12 DIAGNOSIS — G47.9 SLEEP DIFFICULTIES: ICD-10-CM

## 2019-03-12 DIAGNOSIS — B37.9 CANDIDIASIS: ICD-10-CM

## 2019-03-12 DIAGNOSIS — E89.0 POSTOPERATIVE HYPOTHYROIDISM: Primary | ICD-10-CM

## 2019-03-12 DIAGNOSIS — M79.10 MYALGIA: ICD-10-CM

## 2019-03-12 LAB — TSH REFLEX FT4: 0.8 UIU/ML (ref 0.27–4.2)

## 2019-03-12 PROCEDURE — 4004F PT TOBACCO SCREEN RCVD TLK: CPT | Performed by: PHYSICIAN ASSISTANT

## 2019-03-12 PROCEDURE — G8420 CALC BMI NORM PARAMETERS: HCPCS | Performed by: PHYSICIAN ASSISTANT

## 2019-03-12 PROCEDURE — 99203 OFFICE O/P NEW LOW 30 MIN: CPT | Performed by: PHYSICIAN ASSISTANT

## 2019-03-12 PROCEDURE — G8427 DOCREV CUR MEDS BY ELIG CLIN: HCPCS | Performed by: PHYSICIAN ASSISTANT

## 2019-03-12 PROCEDURE — G8484 FLU IMMUNIZE NO ADMIN: HCPCS | Performed by: PHYSICIAN ASSISTANT

## 2019-03-12 RX ORDER — DIPHENHYDRAMINE HCL 25 MG
100 TABLET ORAL
COMMUNITY

## 2019-03-12 RX ORDER — FLUCONAZOLE 100 MG/1
TABLET ORAL
Qty: 15 TABLET | Refills: 0 | Status: SHIPPED | OUTPATIENT
Start: 2019-03-12 | End: 2019-03-20

## 2019-03-12 RX ORDER — DULOXETIN HYDROCHLORIDE 30 MG/1
30 CAPSULE, DELAYED RELEASE ORAL DAILY
Qty: 30 CAPSULE | Refills: 3 | Status: SHIPPED | OUTPATIENT
Start: 2019-03-12 | End: 2020-08-18

## 2019-03-12 RX ORDER — LEVOTHYROXINE SODIUM 0.1 MG/1
100 TABLET ORAL DAILY
COMMUNITY
Start: 2019-02-14

## 2019-03-12 RX ORDER — TRAMADOL HYDROCHLORIDE 50 MG/1
50 TABLET ORAL
COMMUNITY
End: 2020-08-18

## 2019-03-12 RX ORDER — TRAMADOL HYDROCHLORIDE 50 MG/1
50 TABLET ORAL
Status: CANCELLED | OUTPATIENT
Start: 2019-03-12

## 2019-03-12 ASSESSMENT — PATIENT HEALTH QUESTIONNAIRE - PHQ9
SUM OF ALL RESPONSES TO PHQ QUESTIONS 1-9: 0
SUM OF ALL RESPONSES TO PHQ QUESTIONS 1-9: 0
2. FEELING DOWN, DEPRESSED OR HOPELESS: 0
SUM OF ALL RESPONSES TO PHQ9 QUESTIONS 1 & 2: 0
1. LITTLE INTEREST OR PLEASURE IN DOING THINGS: 0

## 2019-03-12 NOTE — TELEPHONE ENCOUNTER
Pharmacy called to confirm quantity of dosing for fluconazole 100mg. Order reads for two tablets taken today and one daily times 14 days with # 15 dispensed. Quantity clarified to match the given order with # 16 tablets  to be given. Verbal noted by pharmacist on phone by this nurse.

## 2019-03-13 PROBLEM — M79.10 MYALGIA: Status: ACTIVE | Noted: 2019-03-13

## 2019-03-13 PROBLEM — G47.9 SLEEP DIFFICULTIES: Status: ACTIVE | Noted: 2019-03-13

## 2019-03-13 PROBLEM — B37.9 CANDIDIASIS: Status: ACTIVE | Noted: 2019-03-13

## 2019-03-13 PROBLEM — E89.0 POSTOPERATIVE HYPOTHYROIDISM: Status: ACTIVE | Noted: 2019-03-13

## 2019-03-13 ASSESSMENT — ENCOUNTER SYMPTOMS
SHORTNESS OF BREATH: 0
COLOR CHANGE: 0
TROUBLE SWALLOWING: 0
NAUSEA: 0
VOMITING: 0
ABDOMINAL PAIN: 0
CHEST TIGHTNESS: 0
DIARRHEA: 0
COUGH: 0

## 2019-04-22 ENCOUNTER — HOSPITAL ENCOUNTER (OUTPATIENT)
Age: 34
Setting detail: SPECIMEN
Discharge: HOME | End: 2019-04-22
Payer: COMMERCIAL

## 2019-04-22 LAB
ALBUMIN SERPL-MCNC: 4.5 GM/DL (ref 3.4–5)
ALP BLD-CCNC: 56 IU/L (ref 40–129)
ALT SERPL-CCNC: 14 U/L (ref 10–40)
ANION GAP SERPL CALCULATED.3IONS-SCNC: 9 MMOL/L (ref 4–16)
AST SERPL-CCNC: 20 IU/L (ref 15–37)
BILIRUB SERPL-MCNC: 0.2 MG/DL (ref 0–1)
BUN BLDV-MCNC: 6 MG/DL (ref 6–23)
CALCIUM SERPL-MCNC: 9.1 MG/DL (ref 8.3–10.6)
CHLORIDE BLD-SCNC: 103 MMOL/L (ref 99–110)
CO2: 26 MMOL/L (ref 21–32)
CREAT SERPL-MCNC: 0.6 MG/DL (ref 0.6–1.1)
FERRITIN: 13 NG/ML (ref 15–150)
GFR AFRICAN AMERICAN: >60 ML/MIN/1.73M2
GFR NON-AFRICAN AMERICAN: >60 ML/MIN/1.73M2
GLUCOSE BLD-MCNC: 99 MG/DL (ref 70–99)
IRON: 37 UG/DL (ref 37–145)
PCT TRANSFERRIN: 10 % (ref 10–44)
POTASSIUM SERPL-SCNC: 3.9 MMOL/L (ref 3.5–5.1)
SODIUM BLD-SCNC: 138 MMOL/L (ref 135–145)
TOTAL IRON BINDING CAPACITY: 362 UG/DL (ref 250–450)
TOTAL PROTEIN: 7 GM/DL (ref 6.4–8.2)
UNSATURATED IRON BINDING CAPACITY: 325 UG/DL (ref 110–370)

## 2019-04-22 PROCEDURE — 83550 IRON BINDING TEST: CPT

## 2019-04-22 PROCEDURE — 83540 ASSAY OF IRON: CPT

## 2019-04-22 PROCEDURE — 80053 COMPREHEN METABOLIC PANEL: CPT

## 2019-04-22 PROCEDURE — 82728 ASSAY OF FERRITIN: CPT

## 2019-06-14 ENCOUNTER — HOSPITAL ENCOUNTER (OUTPATIENT)
Age: 34
Discharge: HOME | End: 2019-06-14
Payer: COMMERCIAL

## 2019-06-14 LAB
T4 FREE: 1.39 NG/DL (ref 0.9–1.8)
TSH HIGH SENSITIVITY: 4.75 UIU/ML (ref 0.27–4.2)

## 2019-06-14 PROCEDURE — 84439 ASSAY OF FREE THYROXINE: CPT

## 2019-06-14 PROCEDURE — 84443 ASSAY THYROID STIM HORMONE: CPT

## 2019-06-14 PROCEDURE — 36415 COLL VENOUS BLD VENIPUNCTURE: CPT

## 2019-08-26 ENCOUNTER — HOSPITAL ENCOUNTER (OUTPATIENT)
Age: 34
Discharge: HOME | End: 2019-08-26
Payer: COMMERCIAL

## 2019-08-26 LAB — TSH HIGH SENSITIVITY: 10.53 UIU/ML (ref 0.27–4.2)

## 2019-08-26 PROCEDURE — 36415 COLL VENOUS BLD VENIPUNCTURE: CPT

## 2019-08-26 PROCEDURE — 84443 ASSAY THYROID STIM HORMONE: CPT

## 2019-10-22 ENCOUNTER — HOSPITAL ENCOUNTER (OUTPATIENT)
Age: 34
Setting detail: SPECIMEN
Discharge: HOME | End: 2019-10-22
Payer: COMMERCIAL

## 2019-10-22 LAB
ALBUMIN SERPL-MCNC: 4.1 GM/DL (ref 3.4–5)
ALP BLD-CCNC: 43 IU/L (ref 40–128)
ALT SERPL-CCNC: 8 U/L (ref 10–40)
ANION GAP SERPL CALCULATED.3IONS-SCNC: 12 MMOL/L (ref 4–16)
AST SERPL-CCNC: 13 IU/L (ref 15–37)
BILIRUB SERPL-MCNC: 0.2 MG/DL (ref 0–1)
BUN BLDV-MCNC: 9 MG/DL (ref 6–23)
CALCIUM SERPL-MCNC: 8.8 MG/DL (ref 8.3–10.6)
CHLORIDE BLD-SCNC: 104 MMOL/L (ref 99–110)
CO2: 24 MMOL/L (ref 21–32)
CREAT SERPL-MCNC: 0.6 MG/DL (ref 0.6–1.1)
FERRITIN: 15 NG/ML (ref 15–150)
GFR AFRICAN AMERICAN: >60 ML/MIN/1.73M2
GFR NON-AFRICAN AMERICAN: >60 ML/MIN/1.73M2
GLUCOSE BLD-MCNC: 79 MG/DL (ref 70–99)
IRON: 124 UG/DL (ref 37–145)
PCT TRANSFERRIN: 34 % (ref 10–44)
POTASSIUM SERPL-SCNC: 4.2 MMOL/L (ref 3.5–5.1)
SODIUM BLD-SCNC: 140 MMOL/L (ref 135–145)
TOTAL IRON BINDING CAPACITY: 366 UG/DL (ref 250–450)
TOTAL PROTEIN: 6.6 GM/DL (ref 6.4–8.2)
UNSATURATED IRON BINDING CAPACITY: 242 UG/DL (ref 110–370)

## 2019-10-22 PROCEDURE — 83540 ASSAY OF IRON: CPT

## 2019-10-22 PROCEDURE — 80053 COMPREHEN METABOLIC PANEL: CPT

## 2019-10-22 PROCEDURE — 83550 IRON BINDING TEST: CPT

## 2019-10-22 PROCEDURE — 82728 ASSAY OF FERRITIN: CPT

## 2020-01-13 ENCOUNTER — HOSPITAL ENCOUNTER (OUTPATIENT)
Age: 35
Discharge: HOME | End: 2020-01-13
Payer: COMMERCIAL

## 2020-01-13 LAB
T4 FREE: 1.32 NG/DL (ref 0.9–1.8)
TSH HIGH SENSITIVITY: 3.96 UIU/ML (ref 0.27–4.2)

## 2020-01-13 PROCEDURE — 36415 COLL VENOUS BLD VENIPUNCTURE: CPT

## 2020-01-13 PROCEDURE — 84443 ASSAY THYROID STIM HORMONE: CPT

## 2020-01-13 PROCEDURE — 84439 ASSAY OF FREE THYROXINE: CPT

## 2020-04-16 ENCOUNTER — HOSPITAL ENCOUNTER (OUTPATIENT)
Age: 35
Discharge: HOME | End: 2020-04-16
Payer: COMMERCIAL

## 2020-04-16 LAB
T3 FREE: 3.3 PG/ML (ref 2.3–4.2)
T4 FREE: 1.54 NG/DL (ref 0.9–1.8)
TSH HIGH SENSITIVITY: 0.47 UIU/ML (ref 0.27–4.2)

## 2020-04-16 PROCEDURE — 84439 ASSAY OF FREE THYROXINE: CPT

## 2020-04-16 PROCEDURE — 84443 ASSAY THYROID STIM HORMONE: CPT

## 2020-04-16 PROCEDURE — 36415 COLL VENOUS BLD VENIPUNCTURE: CPT

## 2020-04-16 PROCEDURE — 84481 FREE ASSAY (FT-3): CPT

## 2020-04-16 PROCEDURE — 86800 THYROGLOBULIN ANTIBODY: CPT

## 2020-04-16 PROCEDURE — 84432 ASSAY OF THYROGLOBULIN: CPT

## 2020-04-17 LAB
ANTITHYROGLOBULIN AB: <0.9 IU/ML (ref 0–4)
ANTITHYROGLOBULIN AB: ABNORMAL IU/ML (ref 0–4)
THYROGLOBULIN BY LC-MS/MS, SERUM/PLASMA: ABNORMAL NG/ML (ref 1.3–31.8)
THYROGLOBULIN BY LC-MS/MS, SERUM/PLASMA: ABNORMAL NG/ML (ref 1.3–31.8)
THYROGLOBULIN: 0.3 NG/ML (ref 1.3–31.8)
THYROGLOBULIN: ABNORMAL NG/ML (ref 1.3–31.8)

## 2020-04-30 PROBLEM — K90.9 INTESTINAL MALABSORPTION, UNSPECIFIED: Status: ACTIVE | Noted: 2020-04-30

## 2020-04-30 PROBLEM — R59.0 LOCALIZED ENLARGED LYMPH NODES: Status: ACTIVE | Noted: 2020-04-30

## 2020-04-30 PROBLEM — C73 MALIGNANT NEOPLASM OF THYROID GLAND (HCC): Status: ACTIVE | Noted: 2020-04-30

## 2020-04-30 PROBLEM — D50.0 IRON DEFICIENCY ANEMIA SECONDARY TO BLOOD LOSS (CHRONIC): Status: ACTIVE | Noted: 2020-04-30

## 2020-04-30 PROBLEM — D64.9 ANEMIA, UNSPECIFIED: Status: ACTIVE | Noted: 2020-04-30

## 2020-07-13 ENCOUNTER — HOSPITAL ENCOUNTER (OUTPATIENT)
Age: 35
Discharge: HOME | End: 2020-07-13
Payer: COMMERCIAL

## 2020-07-13 LAB
T3 FREE: 3.3 PG/ML (ref 2.3–4.2)
T4 FREE: 1.43 NG/DL (ref 0.9–1.8)
TSH HIGH SENSITIVITY: 0.06 UIU/ML (ref 0.27–4.2)

## 2020-07-13 PROCEDURE — 36415 COLL VENOUS BLD VENIPUNCTURE: CPT

## 2020-07-13 PROCEDURE — 84481 FREE ASSAY (FT-3): CPT

## 2020-07-13 PROCEDURE — 84443 ASSAY THYROID STIM HORMONE: CPT

## 2020-07-13 PROCEDURE — 84439 ASSAY OF FREE THYROXINE: CPT

## 2020-08-07 ENCOUNTER — HOSPITAL ENCOUNTER (OUTPATIENT)
Age: 35
Discharge: HOME | End: 2020-08-07
Payer: COMMERCIAL

## 2020-08-07 LAB
T4 FREE: 1.34 NG/DL (ref 0.9–1.8)
TSH HIGH SENSITIVITY: 0.26 UIU/ML (ref 0.27–4.2)

## 2020-08-07 PROCEDURE — 36415 COLL VENOUS BLD VENIPUNCTURE: CPT

## 2020-08-07 PROCEDURE — 84439 ASSAY OF FREE THYROXINE: CPT

## 2020-08-07 PROCEDURE — 84443 ASSAY THYROID STIM HORMONE: CPT

## 2020-08-18 ENCOUNTER — HOSPITAL ENCOUNTER (EMERGENCY)
Age: 35
Discharge: HOME | End: 2020-08-18
Attending: EMERGENCY MEDICINE
Payer: COMMERCIAL

## 2020-08-18 VITALS
TEMPERATURE: 98.6 F | SYSTOLIC BLOOD PRESSURE: 122 MMHG | BODY MASS INDEX: 22.66 KG/M2 | OXYGEN SATURATION: 96 % | HEART RATE: 74 BPM | HEIGHT: 61 IN | RESPIRATION RATE: 16 BRPM | WEIGHT: 120 LBS | DIASTOLIC BLOOD PRESSURE: 57 MMHG

## 2020-08-18 PROCEDURE — 99283 EMERGENCY DEPT VISIT LOW MDM: CPT

## 2020-08-18 RX ORDER — LIOTHYRONINE SODIUM 5 UG/1
5 TABLET ORAL 2 TIMES DAILY
COMMUNITY
Start: 2020-04-21

## 2020-08-18 RX ORDER — CHLORHEXIDINE GLUCONATE 0.12 MG/ML
15 RINSE ORAL 2 TIMES DAILY
Qty: 420 ML | Refills: 0 | Status: SHIPPED | OUTPATIENT
Start: 2020-08-18 | End: 2020-09-01

## 2020-08-18 ASSESSMENT — PAIN DESCRIPTION - LOCATION: LOCATION: TEETH;FACE

## 2020-08-18 ASSESSMENT — PAIN SCALES - GENERAL: PAINLEVEL_OUTOF10: 7

## 2020-08-18 ASSESSMENT — PAIN DESCRIPTION - DESCRIPTORS: DESCRIPTORS: ACHING

## 2020-08-18 ASSESSMENT — PAIN DESCRIPTION - PROGRESSION: CLINICAL_PROGRESSION: GRADUALLY WORSENING

## 2020-08-18 ASSESSMENT — PAIN DESCRIPTION - ORIENTATION: ORIENTATION: RIGHT

## 2020-08-18 ASSESSMENT — PAIN DESCRIPTION - PAIN TYPE: TYPE: ACUTE PAIN

## 2020-08-18 ASSESSMENT — PAIN DESCRIPTION - ONSET: ONSET: ON-GOING

## 2020-08-18 ASSESSMENT — PAIN DESCRIPTION - FREQUENCY: FREQUENCY: CONTINUOUS

## 2020-08-18 NOTE — ED PROVIDER NOTES
Triage Chief Complaint:   Dental Pain (Ate popcorn on Friday, thought she had kernel stuck, is now having pain and pressure in upper right side of mouth. Dentist won't see her because she told them she had a low grade fever) and Oral Swelling    [place]:  Fernando Castro is a 30 y.o. female that presents to the ED complaint of tooth pain. States she ate some popcorn without a kernel got stuck. She called her dentist to be seen they want to see her she had a low-grade fever. No pain on mastication no hot or cold intolerance denies any drainage or bleeding.     Past Medical History:   Diagnosis Date    Cancer [facility])     path report from thyroid bx- + papillary carcinoma 2016- tx with radioactive iodine and surgery\"- follows with Dr Ramona Tucker Enlarged thyroid     for bx 2016    Heart abnormality     heart murmur    Kidney stone     \"use to see Dr Brook Harris, last kidney stone over a year ago, started approx 4 yrs ago- passed some in past\"    Murmur, heart     \"told I had this with pregnancy in , no echo, do have occ palpitations when lay on right side\"    PONV (postoperative nausea and vomiting)     denies any motion sickness     Past Surgical History:   Procedure Laterality Date    ABDOMEN SURGERY      uterine suspension,      SECTION      CHOLECYSTECTOMY      CYSTOSCOPY  2012    LAPAROSCOPY      x5- last one 2009    MYRINGOTOMY [address]      as a child    THYROIDECTOMY  10/17/2016    total thyroidectomy     TUBAL LIGATION      UTERINE SUSPENSION       Family History   Problem Relation Age of Onset    Arthritis Mother     High Blood Pressure Mother     Other Mother         lupus & fibromyalgia    Arthritis Father     Mental Retardation Paternal Uncle     Arthritis Maternal Grandmother     Diabetes Maternal Grandmother     High Blood Pressure Maternal Grandmother     Other Maternal Grandmother         fibromyalgia    Arthritis Maternal Grandfather     Diabetes Maternal Grandfather     High Blood Pressure Maternal Grandfather     Arthritis Paternal Grandmother     Heart Attack Paternal Grandmother     Arthritis Paternal Grandfather     Heart Attack Paternal Grandfather      Social History     Socioeconomic History    Marital status:      Spouse name: Not on file    Number of children: Not on file    Years of education: Not on file    Highest education level: Not on file   Occupational History    Not on file   Social Needs    Financial resource strain: Not on file    Food insecurity     Worry: Not on file     Inability: Not on file    Transportation needs     Medical: Not on file     Non-medical: Not on file   Tobacco Use    Smoking status: Current Every Day Smoker     Packs/day: 0.50     Years: 12.00     Pack years: 6.00     Types: Cigarettes    Smokeless tobacco: Never Used    Tobacco comment: \"trying to quit\"   Substance and Sexual Activity    Alcohol use: No    Drug use: No    Sexual activity: Yes     Partners: Male   Lifestyle    Physical activity     Days per week: Not on file     Minutes per session: Not on file    Stress: Not on file   Relationships    Social connections     Talks on phone: Not on file     Gets together: Not on file     Attends Muslim service: Not on file     Active member of club or organization: Not on file     Attends meetings of clubs or organizations: Not on file     Relationship status: Not on file    Intimate partner violence     Fear of current or ex partner: Not on file     Emotionally abused: Not on file     Physically abused: Not on file     Forced sexual activity: Not on file   Other Topics Concern    Not on file   Social History Narrative    Not on file     No current facility-administered medications for this encounter.       Current Outpatient Medications   Medication Sig Dispense Refill    liothyronine (CYTOMEL) 5 MCG tablet Take 5 mcg by mouth 2 times daily      chlorhexidine (PERIDEX) 0.12 % solution Take 15 mLs by mouth 2 times daily for 14 days 420 mL 0    diphenhydrAMINE (BENADRYL) 25 MG tablet Take 100 mg by mouth      ferrous sulfate 325 (65 Fe) MG tablet Take 325 mg by mouth daily (with breakfast)      Norgestimate-Eth Estradiol (SPRINTEC 28 PO) Take by mouth      levothyroxine (SYNTHROID) 100 MCG tablet Take 100 mcg by mouth Daily        Allergies   Allergen Reactions    Latex Swelling and Rash     Swelling and rash    Ciprofloxacin      Right arm went numb; heart rate racing (was give cipro/flagyl combo at hospital)         ROS:    Review of Systems   Constitutional: Positive for fever. HENT: Positive for dental problem. All other systems reviewed and are negative. Nursing Notes Reviewed    Physical Exam:  ED Triage Vitals   Enc Vitals Group      BP       Pulse       Resp       Temp       Temp src       SpO2       Weight       Height       Head Circumference       Peak Flow       Pain Score       Pain Loc       Pain Edu? Excl. in [address]? Physical Exam  Vitals signs and nursing note reviewed. Exam conducted with a chaperone present. Constitutional:       Appearance: She is well-developed. HENT:      Head: Normocephalic and atraumatic. Mouth/Throat:      Lips: Pink. Mouth: Mucous membranes are moist.      Dentition: Abnormal dentition. Does not have dentures. Dental tenderness present. No gingival swelling, dental caries, dental abscesses or gum lesions. Pharynx: Oropharynx is clear. Eyes:      Pupils: Pupils are equal, round, and reactive to light. Neck:      Musculoskeletal: Normal range of motion and neck supple. Musculoskeletal: Normal range of motion. Skin:     General: Skin is warm and dry. Neurological:      Mental Status: She is alert and oriented to person, place, and time.          I have reviewed and interpreted all of the currently available lab results from this visit (ifapplicable):  No results found for this visit on 08/18/20. Radiographs (if obtained):  [] The following radiograph wasinterpreted by myself in the absence of a radiologist:   [] Radiologist's Report Reviewed:  No orders to display         EKG (if obtained): (All EKG's are interpreted by myself in the absence of a cardiologist)    Chart review shows recent radiographs:  No results found. MDM:      Patient's dentition overall are in good shape. There is no decay no caries no fracture no signs of infection gingiva are normal.  She has mild percussion tenderness to the right first maxillary molar. I use dental floss and was able to floss the central teeth as well as the anterior aspect of the first molar I could not go posterior. This is uncomfortable because a trans-amount of bleeding. I recommend follow-up with the referral dentist that I gave her to she will be given a Peridex rinse. Gentle brushing and flexion should continue           Clinical Impression:  1. Odontalgia      Disposition referral (if applicable):    Call LISTED DENTIST  Schedule an appointment as soon as possible for a visit in 1 day      Disposition medications (if applicable):  Discharge Medication List as of 8/18/2020  3:55 PM      START taking these medications    Details   chlorhexidine (PERIDEX) 0.12 % solution Take 15 mLs by mouth 2 times daily for 14 days, Disp-420 mL,R-0Print                 Manolo Whitehead DO, FACEP      Comment: Please note this report has been produced using speech recognition software and maycontain errors related to that system including errors in grammar, punctuation, and spelling, as well as words and phrases that may be inappropriate. If there are any questions or concerns please feel free to contact thedictating provider for clarification.         Michelle Mitchell DO  08/18/20 8 [facility], DO  08/24/20 [id number]

## 2020-08-18 NOTE — ED NOTES
Pt discharged with instructions and prescriptions. Discussed when and how to take medications and pt stated understanding.   Pt walked out of the [id number], RN  08/18/20 [id number]

## 2020-08-31 ENCOUNTER — OFFICE VISIT (OUTPATIENT)
Dept: ONCOLOGY | Age: 35
End: 2020-08-31
Payer: COMMERCIAL

## 2020-08-31 ENCOUNTER — HOSPITAL ENCOUNTER (OUTPATIENT)
Dept: INFUSION THERAPY | Age: 35
Discharge: HOME | End: 2020-08-31
Payer: COMMERCIAL

## 2020-08-31 VITALS
OXYGEN SATURATION: 99 % | SYSTOLIC BLOOD PRESSURE: 115 MMHG | TEMPERATURE: 98.1 F | WEIGHT: 123 LBS | HEART RATE: 67 BPM | HEIGHT: 61 IN | BODY MASS INDEX: 23.22 KG/M2 | DIASTOLIC BLOOD PRESSURE: 55 MMHG | RESPIRATION RATE: 18 BRPM

## 2020-08-31 DIAGNOSIS — D50.0 IRON DEFICIENCY ANEMIA SECONDARY TO BLOOD LOSS (CHRONIC): ICD-10-CM

## 2020-08-31 LAB
ALBUMIN SERPL-MCNC: 4.1 GM/DL (ref 3.4–5)
ALP BLD-CCNC: 59 IU/L (ref 40–128)
ALT SERPL-CCNC: 10 U/L (ref 10–40)
ANION GAP SERPL CALCULATED.3IONS-SCNC: 9 MMOL/L (ref 4–16)
AST SERPL-CCNC: 12 IU/L (ref 15–37)
BASOPHILS ABSOLUTE: 0 K/CU MM
BASOPHILS RELATIVE PERCENT: 0.2 % (ref 0–1)
BILIRUB SERPL-MCNC: 0.3 MG/DL (ref 0–1)
BUN BLDV-MCNC: 8 MG/DL (ref 6–23)
CALCIUM SERPL-MCNC: 8.8 MG/DL (ref 8.3–10.6)
CHLORIDE BLD-SCNC: 103 MMOL/L (ref 99–110)
CO2: 25 MMOL/L (ref 21–32)
CREAT SERPL-MCNC: 0.6 MG/DL (ref 0.6–1.1)
DIFFERENTIAL TYPE: ABNORMAL
EOSINOPHILS ABSOLUTE: 0.3 K/CU MM
EOSINOPHILS RELATIVE PERCENT: 3.9 % (ref 0–3)
FERRITIN: 18 NG/ML (ref 15–150)
GFR AFRICAN AMERICAN: >60 ML/MIN/1.73M2
GFR NON-AFRICAN AMERICAN: >60 ML/MIN/1.73M2
GLUCOSE BLD-MCNC: 106 MG/DL (ref 70–99)
HCT VFR BLD CALC: 35.9 % (ref 37–47)
HEMOGLOBIN: 11.9 GM/DL (ref 12.5–16)
IRON: 168 UG/DL (ref 37–145)
LYMPHOCYTES ABSOLUTE: 1.8 K/CU MM
LYMPHOCYTES RELATIVE PERCENT: 22.9 % (ref 24–44)
MCH RBC QN AUTO: 28.8 PG (ref 27–31)
MCHC RBC AUTO-ENTMCNC: 33.1 % (ref 32–36)
MCV RBC AUTO: 86.9 FL (ref 78–100)
MONOCYTES ABSOLUTE: 0.6 K/CU MM
MONOCYTES RELATIVE PERCENT: 7 % (ref 0–4)
PCT TRANSFERRIN: 45 % (ref 10–44)
PDW BLD-RTO: 13.7 % (ref 11.7–14.9)
PLATELET # BLD: 206 K/CU MM (ref 140–440)
PMV BLD AUTO: 11.3 FL (ref 7.5–11.1)
POTASSIUM SERPL-SCNC: 3.9 MMOL/L (ref 3.5–5.1)
RBC # BLD: 4.13 M/CU MM (ref 4.2–5.4)
SEGMENTED NEUTROPHILS ABSOLUTE COUNT: 5.3 K/CU MM
SEGMENTED NEUTROPHILS RELATIVE PERCENT: 66 % (ref 36–66)
SODIUM BLD-SCNC: 137 MMOL/L (ref 135–145)
TOTAL IRON BINDING CAPACITY: 374 UG/DL (ref 250–450)
TOTAL PROTEIN: 6.4 GM/DL (ref 6.4–8.2)
UNSATURATED IRON BINDING CAPACITY: 206 UG/DL (ref 110–370)
WBC # BLD: 8 K/CU MM (ref 4–10.5)

## 2020-08-31 PROCEDURE — G8420 CALC BMI NORM PARAMETERS: HCPCS | Performed by: INTERNAL MEDICINE

## 2020-08-31 PROCEDURE — 83550 IRON BINDING TEST: CPT

## 2020-08-31 PROCEDURE — 82728 ASSAY OF FERRITIN: CPT

## 2020-08-31 PROCEDURE — 4004F PT TOBACCO SCREEN RCVD TLK: CPT | Performed by: INTERNAL MEDICINE

## 2020-08-31 PROCEDURE — 80053 COMPREHEN METABOLIC PANEL: CPT

## 2020-08-31 PROCEDURE — 85025 COMPLETE CBC W/AUTO DIFF WBC: CPT

## 2020-08-31 PROCEDURE — 36415 COLL VENOUS BLD VENIPUNCTURE: CPT

## 2020-08-31 PROCEDURE — 83540 ASSAY OF IRON: CPT

## 2020-08-31 PROCEDURE — G0463 HOSPITAL OUTPT CLINIC VISIT: HCPCS

## 2020-08-31 PROCEDURE — 99213 OFFICE O/P EST LOW 20 MIN: CPT | Performed by: INTERNAL MEDICINE

## 2020-08-31 PROCEDURE — G8427 DOCREV CUR MEDS BY ELIG CLIN: HCPCS | Performed by: INTERNAL MEDICINE

## 2020-08-31 PROCEDURE — 99211 OFF/OP EST MAY X REQ PHY/QHP: CPT

## 2020-08-31 RX ORDER — PREDNISONE 50 MG/1
TABLET ORAL
COMMUNITY
Start: 2020-08-18 | End: 2021-03-25

## 2020-08-31 RX ORDER — DEXAMETHASONE 4 MG/1
TABLET ORAL
COMMUNITY
Start: 2020-08-19 | End: 2021-03-25

## 2020-08-31 RX ORDER — FERROUS SULFATE 325(65) MG
325 TABLET ORAL
Qty: 90 TABLET | Refills: 3 | Status: SHIPPED | OUTPATIENT
Start: 2020-08-31 | End: 2021-05-05 | Stop reason: ALTCHOICE

## 2020-08-31 RX ORDER — FLUCONAZOLE 150 MG/1
TABLET ORAL
COMMUNITY
Start: 2020-08-19 | End: 2021-03-25

## 2020-08-31 NOTE — PROGRESS NOTES
MA Rooming Questions  Patient: Reyes Junker  MRN: [id number]    Date: 8/31/2020        1. Do you have any new issues?   no         2. Do you need any refills on medications?    no    3. Have you had any imaging done since your last visit?   no    4. Have you been hospitalized or seen in the emergency room since your last visit here?   yes -     5. Did the patient have a depression screening completed today?  No    No data recorded     PHQ-9 Given to (if applicable):               PHQ-9 Score (if applicable):                     [] Positive     []  Negative              Does question #9 need addressed (if applicable)                     [] Yes    []  No               Bret Lomax MA

## 2020-08-31 NOTE — PROGRESS NOTES
Patient Name: Reyes Junker  Patient : 1985  Patient MRN: [id number]     Primary Oncologist: Sinai Taylor MD  Referring Provider: No primary care provider on file. Date of Service: 2020      Chief Complaint:   Chief Complaint   Patient presents with    Follow-up     Patient Active Problem List:     Iron deficiency anemia secondary to blood loss (chronic)     Lymphadenopathy     Malignant neoplasm of thyroid gland     HPI:   Ms. Arley Gill is a very pleasant 80-year-old patient with medical history significant for papillary thyroid carcinoma diagnosed on October 3, 2016, status post total thyroidectomy on 2016, initially referred to me on 2017, for evaluation of cervical lymphadenopathy. She stated that she had soft tissue neck ultrasound on 2016, for swollen neck glands. She was found to have solid, 2.8 cm inferior right thyroid lobe nodule with microcalcifications. Radiologist strongly recommends fine-needle aspiration. She underwent fine-needle aspiration on 2016, and the final pathology was consistent with papillary carcinoma. She was subsequently evaluated by Dr. Zara Chou and she underwent total thyroidectomy on 2016. Final pathology revealed 2.5 cm encapsulated papillary carcinoma. It was low-grade tumor and margins were negative. There was no tumor capsular invasion, no lymphvascular invasion, no perineural invasion, and there was no extra thyroid extension. No lymph nodes were submitted for evaluation (pT2 pNx pMx). She was started with Levothyroxine since then and she has been followed closely by primary care physician. She started noticing palpable lymphadenopathy in the bilateral neck and inguinal area.   She underwent soft tissue neck sonogram on 2016, and she was found to have prominent bilateral cervical lymph nodes, likely benign given the short axis diameter is less than 1 cm (there is a lymph node [place]. Her endocrinologist does not recommend her to have radioiodine therapy and she has been followed closely since then. She has laboratory workups on 2018 for her my anemia and thrombocytopenia. All the workups were normal except she was found to have severe iron deficiency. BCR/ABL analysis was sent and she is negative for BCR/ABL translocations. She has been on oral iron supplementation since 2018. On 2020, she presented to me for follow up. I have been following her for iron deficiency anemia, relative eosinophilia and history of papillary thyroid cancer. I believe her iron deficiency anemia is due to menorrhagia, probably due to thyroid hormone replacement therapy. She is currently on ferrous sulfate 325 mg daily. I recognized that she has mild anemia on today visit. I will follow-up with her iron study done today. She stated that she could not tolerate ferrous sulfate 2 tablets daily since it causes her severe constipation. She cannot get the iron infusion as well since she is so busy and she cannot come for iron infusion. I recommended to continue with current dose of oral iron supplementation for now. I will continue to monitor her iron status closely. I will see her again in six months and will repeat all the blood tests again a week before next appointment. She does not have any significant symptoms at today's visit. PAST MEDICAL HISTORY:  Past medical history significant for:  1. Papillary thyroid cancer. 2.      Migraine headaches. PAST SURGICAL HISTORY:  Past surgical history significant for:  1. Tubal ligation. 2.      Uterine suspension. 3.      Two  sections. 4.      Total thyroidectomy. FAMILY HISTORY:  Significant for cervical cancer in her great grandmother and thyroid cancer in one of her aunts. No other family history of cancer disease.       SOCIAL HISTORY:  She is a current smoker and Labs:  Hematology:  Lab Results   Component Value Date    WBC 8.0 08/31/2020    RBC 4.13 (L) 08/31/2020    HGB 11.9 (L) 08/31/2020    HCT 35.9 (L) 08/31/2020    MCV 86.9 08/31/2020    MCH 28.8 08/31/2020    MCHC 33.1 08/31/2020    RDW 13.7 08/31/2020     08/31/2020    MPV 11.3 (H) 08/31/2020    SEGSPCT 66.0 08/31/2020    EOSRELPCT 3.9 (H) 08/31/2020    BASOPCT 0.2 08/31/2020    LYMPHOPCT 22.9 (L) 08/31/2020    MONOPCT 7.0 (H) 08/31/2020    SEGSABS 5.3 08/31/2020    EOSABS 0.3 08/31/2020    BASOSABS 0.0 08/31/2020    LYMPHSABS 1.8 08/31/2020    MONOSABS 0.6 08/31/2020    DIFFTYPE AUTOMATED DIFFERENTIAL 08/31/2020    POLYCHROM 1+ 04/02/2018    PLTM PLATELETS APPEAR NORMAL 04/02/2018     Lab Results   Component Value Date    ESR 14 04/02/2018     Chemistry:  Lab Results   Component Value Date     10/22/2019    K 4.2 10/22/2019     10/22/2019    CO2 24 10/22/2019    BUN 9 10/22/2019    CREATININE 0.6 10/22/2019    GLUCOSE 79 10/22/2019    CALCIUM 8.8 10/22/2019    PROT 6.6 10/22/2019    LABALBU 4.1 10/22/2019    BILITOT 0.2 10/22/2019    ALKPHOS 43 10/22/2019    AST 13 (L) 10/22/2019    ALT 8 (L) 10/22/2019    LABGLOM >60 10/22/2019    GFRAA >60 10/22/2019     Lab Results   Component Value Date     04/02/2018     No components found for: LD  Lab Results   Component Value Date    TSHHS 0.258 (L) 08/07/2020    T4FREE 1.34 08/07/2020    FT3 3.3 07/13/2020     Immunology:  Lab Results   Component Value Date    PROT 6.6 10/22/2019     No results found for: Duwaine Crate, KLFLCR  No results found for: B2M  Coagulation Panel:  Lab Results   Component Value Date    PROTIME 11.2 09/29/2016    INR 0.98 09/29/2016    APTT 23.2 09/29/2016    DDIMER <200 07/14/2018     Anemia Panel:  Lab Results   Component Value Date    AXJHDNPP19 414.4 04/02/2018    FOLATE >20.0 (H) 04/02/2018     Tumor Markers:  No results found for: , CEA, , LABCA2, PSA  Observations:  No data recorded

## 2020-09-01 ENCOUNTER — TELEPHONE (OUTPATIENT)
Dept: ONCOLOGY | Age: 35
End: 2020-09-01

## 2020-09-01 NOTE — TELEPHONE ENCOUNTER
Per Dr Keena Navas, phoned pt and left message that iron is still low and pt should continue taking oral iron supplements.

## 2020-10-05 ENCOUNTER — HOSPITAL ENCOUNTER (OUTPATIENT)
Age: 35
Discharge: HOME | End: 2020-10-05
Payer: COMMERCIAL

## 2020-10-05 LAB
T4 FREE: 1.39 NG/DL (ref 0.9–1.8)
TSH HIGH SENSITIVITY: 0.04 UIU/ML (ref 0.27–4.2)

## 2020-10-05 PROCEDURE — 84443 ASSAY THYROID STIM HORMONE: CPT

## 2020-10-05 PROCEDURE — 84432 ASSAY OF THYROGLOBULIN: CPT

## 2020-10-05 PROCEDURE — 36415 COLL VENOUS BLD VENIPUNCTURE: CPT

## 2020-10-05 PROCEDURE — 84439 ASSAY OF FREE THYROXINE: CPT

## 2020-10-05 PROCEDURE — 86800 THYROGLOBULIN ANTIBODY: CPT

## 2020-10-05 PROCEDURE — 84480 ASSAY TRIIODOTHYRONINE (T3): CPT

## 2020-10-07 LAB
ANTITHYROGLOBULIN AB: <0.9 IU/ML (ref 0–4)
T3 TOTAL: 166 NG/DL (ref 80–200)
THYROGLOBULIN BY LC-MS/MS, SERUM/PLASMA: ABNORMAL NG/ML (ref 1.3–31.8)
THYROGLOBULIN: 0.2 NG/ML (ref 1.3–31.8)

## 2021-01-25 ENCOUNTER — HOSPITAL ENCOUNTER (OUTPATIENT)
Age: 36
Discharge: HOME | End: 2021-01-25
Payer: COMMERCIAL

## 2021-01-25 LAB — TSH HIGH SENSITIVITY: 0.01 UIU/ML (ref 0.27–4.2)

## 2021-01-25 PROCEDURE — 86800 THYROGLOBULIN ANTIBODY: CPT

## 2021-01-25 PROCEDURE — 84439 ASSAY OF FREE THYROXINE: CPT

## 2021-01-25 PROCEDURE — 36415 COLL VENOUS BLD VENIPUNCTURE: CPT

## 2021-01-25 PROCEDURE — 84443 ASSAY THYROID STIM HORMONE: CPT

## 2021-01-25 PROCEDURE — 84480 ASSAY TRIIODOTHYRONINE (T3): CPT

## 2021-01-25 PROCEDURE — 84432 ASSAY OF THYROGLOBULIN: CPT

## 2021-01-26 LAB
ANTITHYROGLOBULIN AB: <0.9 IU/ML (ref 0–4)
T3 TOTAL: 178 NG/DL (ref 80–200)
T4 FREE: 1.51 NG/DL (ref 0.9–1.8)
THYROGLOBULIN BY LC-MS/MS, SERUM/PLASMA: ABNORMAL NG/ML (ref 1.3–31.8)
THYROGLOBULIN: 0.2 NG/ML (ref 1.3–31.8)

## 2021-03-18 ENCOUNTER — HOSPITAL ENCOUNTER (OUTPATIENT)
Dept: INFUSION THERAPY | Age: 36
Discharge: HOME | End: 2021-03-18
Payer: COMMERCIAL

## 2021-03-18 DIAGNOSIS — D50.0 IRON DEFICIENCY ANEMIA SECONDARY TO BLOOD LOSS (CHRONIC): ICD-10-CM

## 2021-03-18 LAB
ALBUMIN SERPL-MCNC: 4.1 GM/DL (ref 3.4–5)
ALP BLD-CCNC: 53 IU/L (ref 40–129)
ALT SERPL-CCNC: 10 U/L (ref 10–40)
ANION GAP SERPL CALCULATED.3IONS-SCNC: 7 MMOL/L (ref 4–16)
AST SERPL-CCNC: 17 IU/L (ref 15–37)
BASOPHILS ABSOLUTE: 0 K/CU MM
BASOPHILS RELATIVE PERCENT: 0.5 % (ref 0–1)
BILIRUB SERPL-MCNC: 0.2 MG/DL (ref 0–1)
BUN BLDV-MCNC: 9 MG/DL (ref 6–23)
CALCIUM SERPL-MCNC: 8.8 MG/DL (ref 8.3–10.6)
CHLORIDE BLD-SCNC: 104 MMOL/L (ref 99–110)
CO2: 29 MMOL/L (ref 21–32)
CREAT SERPL-MCNC: 0.7 MG/DL (ref 0.6–1.1)
DIFFERENTIAL TYPE: ABNORMAL
EOSINOPHILS ABSOLUTE: 0.2 K/CU MM
EOSINOPHILS RELATIVE PERCENT: 2 % (ref 0–3)
FERRITIN: 8 NG/ML (ref 15–150)
GFR AFRICAN AMERICAN: >60 ML/MIN/1.73M2
GFR NON-AFRICAN AMERICAN: >60 ML/MIN/1.73M2
GLUCOSE BLD-MCNC: 90 MG/DL (ref 70–99)
HCT VFR BLD CALC: 38.2 % (ref 37–47)
HEMOGLOBIN: 13 GM/DL (ref 12.5–16)
IRON: 73 UG/DL (ref 37–145)
LYMPHOCYTES ABSOLUTE: 2.4 K/CU MM
LYMPHOCYTES RELATIVE PERCENT: 31.9 % (ref 24–44)
MCH RBC QN AUTO: 28.6 PG (ref 27–31)
MCHC RBC AUTO-ENTMCNC: 34 % (ref 32–36)
MCV RBC AUTO: 84 FL (ref 78–100)
MONOCYTES ABSOLUTE: 0.5 K/CU MM
MONOCYTES RELATIVE PERCENT: 6 % (ref 0–4)
PCT TRANSFERRIN: 18 % (ref 10–44)
PDW BLD-RTO: 13.8 % (ref 11.7–14.9)
PLATELET # BLD: 234 K/CU MM (ref 140–440)
PMV BLD AUTO: 11.2 FL (ref 7.5–11.1)
POTASSIUM SERPL-SCNC: 4.5 MMOL/L (ref 3.5–5.1)
RBC # BLD: 4.55 M/CU MM (ref 4.2–5.4)
SEGMENTED NEUTROPHILS ABSOLUTE COUNT: 4.5 K/CU MM
SEGMENTED NEUTROPHILS RELATIVE PERCENT: 59.6 % (ref 36–66)
SODIUM BLD-SCNC: 140 MMOL/L (ref 135–145)
TOTAL IRON BINDING CAPACITY: 400 UG/DL (ref 250–450)
TOTAL PROTEIN: 6.6 GM/DL (ref 6.4–8.2)
UNSATURATED IRON BINDING CAPACITY: 327 UG/DL (ref 110–370)
WBC # BLD: 7.6 K/CU MM (ref 4–10.5)

## 2021-03-18 PROCEDURE — 80053 COMPREHEN METABOLIC PANEL: CPT

## 2021-03-18 PROCEDURE — 85025 COMPLETE CBC W/AUTO DIFF WBC: CPT

## 2021-03-18 PROCEDURE — 83550 IRON BINDING TEST: CPT

## 2021-03-18 PROCEDURE — 83540 ASSAY OF IRON: CPT

## 2021-03-18 PROCEDURE — 82728 ASSAY OF FERRITIN: CPT

## 2021-03-18 PROCEDURE — 36415 COLL VENOUS BLD VENIPUNCTURE: CPT

## 2021-03-25 ENCOUNTER — HOSPITAL ENCOUNTER (OUTPATIENT)
Dept: INFUSION THERAPY | Age: 36
Discharge: HOME | End: 2021-03-25
Payer: COMMERCIAL

## 2021-03-25 ENCOUNTER — OFFICE VISIT (OUTPATIENT)
Dept: ONCOLOGY | Age: 36
End: 2021-03-25
Payer: COMMERCIAL

## 2021-03-25 VITALS
RESPIRATION RATE: 16 BRPM | DIASTOLIC BLOOD PRESSURE: 58 MMHG | SYSTOLIC BLOOD PRESSURE: 129 MMHG | HEIGHT: 61 IN | WEIGHT: 118.6 LBS | OXYGEN SATURATION: 98 % | TEMPERATURE: 97.5 F | HEART RATE: 65 BPM | BODY MASS INDEX: 22.39 KG/M2

## 2021-03-25 DIAGNOSIS — K90.9 INTESTINAL MALABSORPTION, UNSPECIFIED TYPE: ICD-10-CM

## 2021-03-25 DIAGNOSIS — C73 MALIGNANT NEOPLASM OF THYROID GLAND (HCC): Primary | ICD-10-CM

## 2021-03-25 PROCEDURE — 99213 OFFICE O/P EST LOW 20 MIN: CPT | Performed by: INTERNAL MEDICINE

## 2021-03-25 PROCEDURE — G8484 FLU IMMUNIZE NO ADMIN: HCPCS | Performed by: INTERNAL MEDICINE

## 2021-03-25 PROCEDURE — G8420 CALC BMI NORM PARAMETERS: HCPCS | Performed by: INTERNAL MEDICINE

## 2021-03-25 PROCEDURE — G8427 DOCREV CUR MEDS BY ELIG CLIN: HCPCS | Performed by: INTERNAL MEDICINE

## 2021-03-25 PROCEDURE — 99211 OFF/OP EST MAY X REQ PHY/QHP: CPT

## 2021-03-25 PROCEDURE — 4004F PT TOBACCO SCREEN RCVD TLK: CPT | Performed by: INTERNAL MEDICINE

## 2021-03-25 RX ORDER — SODIUM CHLORIDE 0.9 % (FLUSH) 0.9 %
5 SYRINGE (ML) INJECTION PRN
Status: CANCELLED | OUTPATIENT
Start: 2021-03-25

## 2021-03-25 RX ORDER — SODIUM CHLORIDE 9 MG/ML
INJECTION, SOLUTION INTRAVENOUS CONTINUOUS
Status: CANCELLED | OUTPATIENT
Start: 2021-03-25

## 2021-03-25 RX ORDER — MELATONIN 10 MG
30 CAPSULE ORAL NIGHTLY
COMMUNITY

## 2021-03-25 RX ORDER — DIPHENHYDRAMINE HYDROCHLORIDE 50 MG/ML
50 INJECTION INTRAMUSCULAR; INTRAVENOUS ONCE
Status: CANCELLED | OUTPATIENT
Start: 2021-03-25 | End: 2021-03-25

## 2021-03-25 RX ORDER — METHYLPREDNISOLONE SODIUM SUCCINATE 125 MG/2ML
125 INJECTION, POWDER, LYOPHILIZED, FOR SOLUTION INTRAMUSCULAR; INTRAVENOUS ONCE
Status: CANCELLED | OUTPATIENT
Start: 2021-03-25 | End: 2021-03-25

## 2021-03-25 RX ORDER — HEPARIN SODIUM (PORCINE) LOCK FLUSH IV SOLN 100 UNIT/ML 100 UNIT/ML
500 SOLUTION INTRAVENOUS PRN
Status: CANCELLED | OUTPATIENT
Start: 2021-03-25

## 2021-03-25 RX ORDER — DEXTROMETHORPHAN HYDROBROMIDE AND PROMETHAZINE HYDROCHLORIDE 15; 6.25 MG/5ML; MG/5ML
SYRUP ORAL
COMMUNITY
Start: 2021-01-12 | End: 2021-03-25

## 2021-03-25 RX ORDER — EPINEPHRINE 1 MG/ML
0.3 INJECTION, SOLUTION, CONCENTRATE INTRAVENOUS PRN
Status: CANCELLED | OUTPATIENT
Start: 2021-03-25

## 2021-03-25 RX ORDER — SODIUM CHLORIDE 0.9 % (FLUSH) 0.9 %
10 SYRINGE (ML) INJECTION PRN
Status: CANCELLED | OUTPATIENT
Start: 2021-03-25

## 2021-03-25 NOTE — PROGRESS NOTES
MA Rooming Questions  Patient: Brandon Carter  MRN: [id number]    Date: 3/25/2021        1. Do you have any new issues? yes - hasnt been taking iron tablets due to constipation, still constipated. 2. Do you need any refills on medications?    no    3. Have you had any imaging done since your last visit? yes -     4. Have you been hospitalized or seen in the emergency room since your last visit here?   no    5. Did the patient have a depression screening completed today?  No    No data recorded     PHQ-9 Given to (if applicable):               PHQ-9 Score (if applicable):                     [] Positive     []  Negative              Does question #9 need addressed (if applicable)                     [] Yes    []  No               Marysol Barahona MA

## 2021-03-25 NOTE — PROGRESS NOTES
Patient Name: Vanna Potts  Patient : 1985  Patient MRN: [id number]     Primary Oncologist: Piyush Mcbride MD  Referring Provider: No primary care provider on file. Date of Service: 3/25/2021      Chief Complaint:   Chief Complaint   Patient presents with    Follow-up     Patient Active Problem List:     Iron deficiency anemia secondary to blood loss (chronic)     Lymphadenopathy     Malignant neoplasm of thyroid gland     HPI:   Ms. Amanda Maldonado is a very pleasant 81-year-old patient with medical history significant for papillary thyroid carcinoma diagnosed on October 3, 2016, status post total thyroidectomy on 2016, initially referred to me on 2017, for evaluation of cervical lymphadenopathy. She stated that she had soft tissue neck ultrasound on 2016, for swollen neck glands. She was found to have solid, 2.8 cm inferior right thyroid lobe nodule with microcalcifications. Radiologist strongly recommends fine-needle aspiration. She underwent fine-needle aspiration on 2016, and the final pathology was consistent with papillary carcinoma. She was subsequently evaluated by Dr. Mallory Santos and she underwent total thyroidectomy on 2016. Final pathology revealed 2.5 cm encapsulated papillary carcinoma. It was low-grade tumor and margins were negative. There was no tumor capsular invasion, no lymphvascular invasion, no perineural invasion, and there was no extra thyroid extension. No lymph nodes were submitted for evaluation (pT2 pNx pMx). She was started with Levothyroxine since then and she has been followed closely by primary care physician. She started noticing palpable lymphadenopathy in the bilateral neck and inguinal area.   She underwent soft tissue neck sonogram on 2016, and she was found to have prominent bilateral cervical lymph nodes, likely benign given the short axis diameter is less than 1 cm (there is a lymph node measuring 2.7 into 0.6 into 1.2 cm in the right side of the neck, there is additional lymph node along the right side of the neck measuring 1.9 into 1.9 into 0.5 cm). (There is a lymph node measuring 1.9 into 0.4 into 0.8 cm along the left side of the neck inferiorly. Along the posterior aspect of the left side of the neck, there is a lymph node with fatty hilar measuring 2.2 into 0.7 into 2 cm). She stated that her lymph node resolved spontaneously after that and it returned again one to two weeks ago. She has some enlarged and tender lymphadenopathy in the left side of her neck especially in the posterior triangle, right side of the neck and supraclavicular area. Because of findings on the physical examination, she was subsequently referred to me for further evaluation. She is currently on Levothyroxine 100 mcg and her last TSH level was 1.23 on the blood tests done on February 8, 2017. She never had radioactive iodine therapy. Family history is significant for thyroid cancer in one of her aunts. Ms. Dion Barboza had repeat sonogram of the soft tissue neck on March 14, 2017, and it showed no significant change in bilateral lymph nodes measuring less than 1 cm in thickness. The lymph nodes are not pathologically enlarged by imaging criteria. She also had pelvic sonogram on March 14, 2017, and it showed bilateral normal appearing inguinal lymph nodes which correlate to the palpable area of interest, not significantly changed since December 22, 2016. Findings are likely benign and recommend followup, if clinically indicated. Laboratory workups done on March 6, 2017, showed normal complete blood count and normal complete metabolic panel. Her thyroglobulin was 0.3, thyroglobulin antibody - less than 0.9, and TSH was 0.849. She was subsequently evaluated by Dr. Lalo Alan and she had thyroid scan which was inconclusive.  She is also following regularly with endocrinologist at [facility]. Her endocrinologist does not recommend her to have radioiodine therapy and she has been followed closely since then. She has laboratory workups on 2018 for her my anemia and thrombocytopenia. All the workups were normal except she was found to have severe iron deficiency. BCR/ABL analysis was sent and she is negative for BCR/ABL translocations. She has been on oral iron supplementation since 2018. On 2021, she presented to me for follow up. I have been following her for iron deficiency anemia, relative eosinophilia and history of papillary thyroid cancer. I believe her iron deficiency anemia is due to menorrhagia. She stated that she stopped taking ferrous sulfate since it causes her severe constipation. I recognize that she has significant iron deficiency on recent blood test done on 2021. Since she has significant iron deficiency and she could not tolerate oral iron supplementation, I recommend her to have iron infusion as soon as possible. I will set her for iron infusion soon and I will reevaluate her iron status again in 6 months. She has significant tiredness and fatigue. Besides that, she does not have any other significant symptoms at today's visit. PAST MEDICAL HISTORY:  Past medical history significant for:  1. Papillary thyroid cancer. 2.      Migraine headaches. PAST SURGICAL HISTORY:  Past surgical history significant for:  1. Tubal ligation. 2.      Uterine suspension. 3.      Two  sections. 4.      Total thyroidectomy. FAMILY HISTORY:  Significant for cervical cancer in her great grandmother and thyroid cancer in one of her aunts. No other family history of cancer disease. SOCIAL HISTORY:  She is a current smoker and she smokes about ten cigarettes a day for more than ten years. She denies alcohol drinking and illicit drug abuse. She was  before and she  in . ALLERGIES:  She claims to have allergies to latex. Review of Systems: \"Per interval history; otherwise 10 point ROS is negative. \"  Her energy level is low, appetite, and sleep are stable. She doesn't have fever, chills, night sweats, cough, shortness of breath, chest pain, hemoptysis or palpitations. Her bowels and bladder functions are normal. She denies nausea, vomiting, abdominal pain, diarrhea, constipation, dysuria, loss of appetite or  weight loss. She doesn't have neuropathy and she denies bleeding or clotting issues. She doesn't have any pain in her body. No anxiety or depression today. The rest of the systems are unremarkable. Vital Signs:  BP (!) 129/58 (Site: Left Upper Arm, Position: Sitting, Cuff Size: Medium Adult)   Pulse 65   Temp 97.5 °F (36.4 °C) (Temporal)   Resp 16   Ht 5' 1\" (1.549 m)   Wt 118 lb 9.6 oz (53.8 kg)   SpO2 98%   BMI 22.41 kg/m²     Physical Exam:  CONSTITUTIONAL: awake, alert, cooperative, no apparent distress   EYES: pupils equal, round and reactive to light, sclera clear and conjunctiva normal  ENT: Normocephalic, without obvious abnormality, atraumatic  NECK: supple, symmetrical, no jugular venous distension and no carotid bruits   HEMATOLOGIC/LYMPHATIC: no cervical, supraclavicular or axillary lymphadenopathy   LUNGS: VBS, no wheezes, no crackles, no rhonchi, no increased work of breathing and clear to auscultation   CARDIOVASCULAR: regular rate and rhythm, normal S1 and S2, no murmur noted  ABDOMEN: normal bowel sounds x 4, soft, non-distended, non-tender, no masses palpated, no hepatosplenomegaly   MUSCULOSKELETAL: full range of motion noted, tone is normal  NEUROLOGIC: awake, alert, oriented to name, place and time. Motor skills grossly intact. SKIN: Normal skin color, texture, turgor and no jaundice.  appears intact   EXTREMITIES: no LE edema, no leg swelling, no cyanosis, no clubbing     Labs:  Hematology:  Lab Results   Component Value Date    WBC 7.6 03/18/2021    RBC 4.55 03/18/2021    HGB 13.0 03/18/2021    HCT 38.2 03/18/2021    MCV 84.0 03/18/2021    MCH 28.6 03/18/2021    MCHC 34.0 03/18/2021    RDW 13.8 03/18/2021     03/18/2021    MPV 11.2 (H) 03/18/2021    SEGSPCT 59.6 03/18/2021    EOSRELPCT 2.0 03/18/2021    BASOPCT 0.5 03/18/2021    LYMPHOPCT 31.9 03/18/2021    MONOPCT 6.0 (H) 03/18/2021    SEGSABS 4.5 03/18/2021    EOSABS 0.2 03/18/2021    BASOSABS 0.0 03/18/2021    LYMPHSABS 2.4 03/18/2021    MONOSABS 0.5 03/18/2021    DIFFTYPE AUTOMATED DIFFERENTIAL 03/18/2021    POLYCHROM 1+ 04/02/2018    PLTM PLATELETS APPEAR NORMAL 04/02/2018     Lab Results   Component Value Date    ESR 14 04/02/2018     Chemistry:  Lab Results   Component Value Date     03/18/2021    K 4.5 03/18/2021     03/18/2021    CO2 29 03/18/2021    BUN 9 03/18/2021    CREATININE 0.7 03/18/2021    GLUCOSE 90 03/18/2021    CALCIUM 8.8 03/18/2021    PROT 6.6 03/18/2021    LABALBU 4.1 03/18/2021    BILITOT 0.2 03/18/2021    ALKPHOS 53 03/18/2021    AST 17 03/18/2021    ALT 10 03/18/2021    LABGLOM >60 03/18/2021    GFRAA >60 03/18/2021     Lab Results   Component Value Date     04/02/2018     No components found for: LD  Lab Results   Component Value Date    TSHHS 0.009 (L) 01/25/2021    T4FREE 1.51 01/25/2021    FT3 3.3 07/13/2020     Immunology:  Lab Results   Component Value Date    PROT 6.6 03/18/2021     No results found for: Jay Avilez, KLFLCR  No results found for: B2M  Coagulation Panel:  Lab Results   Component Value Date    PROTIME 11.2 09/29/2016    INR 0.98 09/29/2016    APTT 23.2 09/29/2016    DDIMER <200 07/14/2018     Anemia Panel:  Lab Results   Component Value Date    OBWRXAAO23 414.4 04/02/2018    FOLATE >20.0 (H) 04/02/2018     Tumor Markers:  No results found for: , CEA, , LABCA2, PSA  Observations:  No data recorded      Assessment & Plan:   1. Cervical and inguinal lymphadenopathy.    2.      Papillary thyroid

## 2021-03-26 ENCOUNTER — TELEPHONE (OUTPATIENT)
Dept: INFUSION THERAPY | Age: 36
End: 2021-03-26

## 2021-04-08 ENCOUNTER — HOSPITAL ENCOUNTER (OUTPATIENT)
Dept: INFUSION THERAPY | Age: 36
Discharge: HOME | End: 2021-04-08
Payer: COMMERCIAL

## 2021-04-08 VITALS
BODY MASS INDEX: 22.31 KG/M2 | DIASTOLIC BLOOD PRESSURE: 53 MMHG | HEIGHT: 61 IN | TEMPERATURE: 98.4 F | RESPIRATION RATE: 16 BRPM | OXYGEN SATURATION: 98 % | WEIGHT: 118.2 LBS | SYSTOLIC BLOOD PRESSURE: 120 MMHG | HEART RATE: 59 BPM

## 2021-04-08 DIAGNOSIS — K90.9 INTESTINAL MALABSORPTION, UNSPECIFIED TYPE: Primary | ICD-10-CM

## 2021-04-08 DIAGNOSIS — D50.0 IRON DEFICIENCY ANEMIA SECONDARY TO BLOOD LOSS (CHRONIC): ICD-10-CM

## 2021-04-08 PROCEDURE — 6360000002 HC RX W HCPCS: Performed by: INTERNAL MEDICINE

## 2021-04-08 PROCEDURE — 2580000003 HC RX 258: Performed by: INTERNAL MEDICINE

## 2021-04-08 PROCEDURE — 96365 THER/PROPH/DIAG IV INF INIT: CPT

## 2021-04-08 PROCEDURE — 96375 TX/PRO/DX INJ NEW DRUG ADDON: CPT

## 2021-04-08 RX ORDER — DIPHENHYDRAMINE HYDROCHLORIDE 50 MG/ML
50 INJECTION INTRAMUSCULAR; INTRAVENOUS ONCE
Status: CANCELLED | OUTPATIENT
Start: 2021-04-15 | End: 2021-04-15

## 2021-04-08 RX ORDER — SODIUM CHLORIDE 9 MG/ML
INJECTION, SOLUTION INTRAVENOUS CONTINUOUS
Status: CANCELLED | OUTPATIENT
Start: 2021-04-15

## 2021-04-08 RX ORDER — EPINEPHRINE 1 MG/ML
0.3 INJECTION, SOLUTION, CONCENTRATE INTRAVENOUS PRN
Status: CANCELLED | OUTPATIENT
Start: 2021-04-15

## 2021-04-08 RX ORDER — METHYLPREDNISOLONE SODIUM SUCCINATE 125 MG/2ML
125 INJECTION, POWDER, LYOPHILIZED, FOR SOLUTION INTRAMUSCULAR; INTRAVENOUS ONCE
Status: CANCELLED | OUTPATIENT
Start: 2021-04-15 | End: 2021-04-15

## 2021-04-08 RX ORDER — SODIUM CHLORIDE 9 MG/ML
INJECTION, SOLUTION INTRAVENOUS CONTINUOUS
Status: ACTIVE | OUTPATIENT
Start: 2021-04-08 | End: 2021-04-08

## 2021-04-08 RX ORDER — HEPARIN SODIUM (PORCINE) LOCK FLUSH IV SOLN 100 UNIT/ML 100 UNIT/ML
500 SOLUTION INTRAVENOUS PRN
Status: CANCELLED | OUTPATIENT
Start: 2021-04-15

## 2021-04-08 RX ORDER — SODIUM CHLORIDE 0.9 % (FLUSH) 0.9 %
5 SYRINGE (ML) INJECTION PRN
Status: CANCELLED | OUTPATIENT
Start: 2021-04-15

## 2021-04-08 RX ORDER — SODIUM CHLORIDE 0.9 % (FLUSH) 0.9 %
10 SYRINGE (ML) INJECTION PRN
Status: CANCELLED | OUTPATIENT
Start: 2021-04-15

## 2021-04-08 RX ADMIN — SODIUM CHLORIDE: 9 INJECTION, SOLUTION INTRAVENOUS at 10:34

## 2021-04-08 RX ADMIN — FERRIC CARBOXYMALTOSE INJECTION 750 MG: 50 INJECTION, SOLUTION INTRAVENOUS at 10:34

## 2021-04-08 NOTE — PROGRESS NOTES
Ambulated to infusion area. Here for iron infusion. All questions answered. Call light within reach. Injectafer administered as ordered. Tolerated well. No complaints after 30 min observation. VS stable. Discharged in stable condition. RTC next week for next infusion. AVS given to patient.

## 2021-04-15 ENCOUNTER — HOSPITAL ENCOUNTER (OUTPATIENT)
Dept: INFUSION THERAPY | Age: 36
Discharge: HOME | End: 2021-04-15
Payer: COMMERCIAL

## 2021-04-15 VITALS
SYSTOLIC BLOOD PRESSURE: 120 MMHG | TEMPERATURE: 97.8 F | BODY MASS INDEX: 22.28 KG/M2 | HEIGHT: 61 IN | HEART RATE: 58 BPM | WEIGHT: 118 LBS | DIASTOLIC BLOOD PRESSURE: 56 MMHG | OXYGEN SATURATION: 99 % | RESPIRATION RATE: 16 BRPM

## 2021-04-15 DIAGNOSIS — K90.9 INTESTINAL MALABSORPTION, UNSPECIFIED TYPE: Primary | ICD-10-CM

## 2021-04-15 DIAGNOSIS — D50.0 IRON DEFICIENCY ANEMIA SECONDARY TO BLOOD LOSS (CHRONIC): ICD-10-CM

## 2021-04-15 PROCEDURE — 2580000003 HC RX 258: Performed by: INTERNAL MEDICINE

## 2021-04-15 PROCEDURE — 6360000002 HC RX W HCPCS: Performed by: INTERNAL MEDICINE

## 2021-04-15 PROCEDURE — 96374 THER/PROPH/DIAG INJ IV PUSH: CPT

## 2021-04-15 RX ORDER — EPINEPHRINE 1 MG/ML
0.3 INJECTION, SOLUTION, CONCENTRATE INTRAVENOUS PRN
Status: CANCELLED | OUTPATIENT
Start: 2021-04-15

## 2021-04-15 RX ORDER — SODIUM CHLORIDE 9 MG/ML
INJECTION, SOLUTION INTRAVENOUS CONTINUOUS
Status: CANCELLED | OUTPATIENT
Start: 2021-04-15

## 2021-04-15 RX ORDER — DIPHENHYDRAMINE HYDROCHLORIDE 50 MG/ML
50 INJECTION INTRAMUSCULAR; INTRAVENOUS ONCE
Status: CANCELLED | OUTPATIENT
Start: 2021-04-15 | End: 2021-04-15

## 2021-04-15 RX ORDER — SODIUM CHLORIDE 9 MG/ML
INJECTION, SOLUTION INTRAVENOUS CONTINUOUS
Status: ACTIVE | OUTPATIENT
Start: 2021-04-15 | End: 2021-04-15

## 2021-04-15 RX ORDER — METHYLPREDNISOLONE SODIUM SUCCINATE 125 MG/2ML
125 INJECTION, POWDER, LYOPHILIZED, FOR SOLUTION INTRAMUSCULAR; INTRAVENOUS ONCE
Status: CANCELLED | OUTPATIENT
Start: 2021-04-15 | End: 2021-04-15

## 2021-04-15 RX ORDER — SODIUM CHLORIDE 0.9 % (FLUSH) 0.9 %
10 SYRINGE (ML) INJECTION PRN
Status: CANCELLED | OUTPATIENT
Start: 2021-04-15

## 2021-04-15 RX ORDER — HEPARIN SODIUM (PORCINE) LOCK FLUSH IV SOLN 100 UNIT/ML 100 UNIT/ML
500 SOLUTION INTRAVENOUS PRN
Status: CANCELLED | OUTPATIENT
Start: 2021-04-15

## 2021-04-15 RX ORDER — SODIUM CHLORIDE 0.9 % (FLUSH) 0.9 %
5 SYRINGE (ML) INJECTION PRN
Status: CANCELLED | OUTPATIENT
Start: 2021-04-15

## 2021-04-15 RX ADMIN — SODIUM CHLORIDE: 9 INJECTION, SOLUTION INTRAVENOUS at 10:04

## 2021-04-15 RX ADMIN — FERRIC CARBOXYMALTOSE INJECTION 750 MG: 50 INJECTION, SOLUTION INTRAVENOUS at 10:17

## 2021-04-15 NOTE — PROGRESS NOTES
Here for Injectafer. Pt voiced no issues or needs. PIV started in right arm. Injectafer administered as ordered. After completion, pt voiced nausea. Offered lemon lime soda and peanut butter crackers. After 10-15 minutes,  pt voiced nausea subsided. Kept for 30 minutes observation, VSS. Discharged in stable condition.

## 2021-04-19 ENCOUNTER — HOSPITAL ENCOUNTER (OUTPATIENT)
Age: 36
Discharge: HOME | End: 2021-04-19
Payer: COMMERCIAL

## 2021-04-19 LAB
T4 FREE: 1.52 NG/DL (ref 0.9–1.8)
TSH HIGH SENSITIVITY: 0.04 UIU/ML (ref 0.27–4.2)

## 2021-04-19 PROCEDURE — 84439 ASSAY OF FREE THYROXINE: CPT

## 2021-04-19 PROCEDURE — 84443 ASSAY THYROID STIM HORMONE: CPT

## 2021-04-19 PROCEDURE — 84480 ASSAY TRIIODOTHYRONINE (T3): CPT

## 2021-04-19 PROCEDURE — 36415 COLL VENOUS BLD VENIPUNCTURE: CPT

## 2021-04-20 LAB — T3 TOTAL: 157 NG/DL (ref 80–200)

## 2021-04-26 ENCOUNTER — APPOINTMENT (OUTPATIENT)
Dept: CT IMAGING | Age: 36
End: 2021-04-26
Payer: COMMERCIAL

## 2021-04-26 ENCOUNTER — HOSPITAL ENCOUNTER (EMERGENCY)
Age: 36
Discharge: HOME | End: 2021-04-26
Attending: EMERGENCY MEDICINE
Payer: COMMERCIAL

## 2021-04-26 VITALS
HEART RATE: 78 BPM | OXYGEN SATURATION: 100 % | RESPIRATION RATE: 16 BRPM | BODY MASS INDEX: 22.28 KG/M2 | DIASTOLIC BLOOD PRESSURE: 67 MMHG | HEIGHT: 61 IN | WEIGHT: 118 LBS | SYSTOLIC BLOOD PRESSURE: 114 MMHG | TEMPERATURE: 98.4 F

## 2021-04-26 DIAGNOSIS — R19.7 DIARRHEA, UNSPECIFIED TYPE: Primary | ICD-10-CM

## 2021-04-26 DIAGNOSIS — T36.95XA ANTIBIOTIC-ASSOCIATED DIARRHEA: ICD-10-CM

## 2021-04-26 DIAGNOSIS — K52.1 ANTIBIOTIC-ASSOCIATED DIARRHEA: ICD-10-CM

## 2021-04-26 LAB
ANION GAP SERPL CALCULATED.3IONS-SCNC: 12 MMOL/L (ref 4–16)
BASOPHILS ABSOLUTE: 0 K/CU MM
BASOPHILS RELATIVE PERCENT: 0.5 % (ref 0–1)
BUN BLDV-MCNC: 9 MG/DL (ref 6–23)
CALCIUM SERPL-MCNC: 9 MG/DL (ref 8.3–10.6)
CHLORIDE BLD-SCNC: 99 MMOL/L (ref 99–110)
CO2: 23 MMOL/L (ref 21–32)
CREAT SERPL-MCNC: 0.6 MG/DL (ref 0.6–1.1)
DIFFERENTIAL TYPE: ABNORMAL
EOSINOPHILS ABSOLUTE: 0.1 K/CU MM
EOSINOPHILS RELATIVE PERCENT: 1.1 % (ref 0–3)
GFR AFRICAN AMERICAN: >60 ML/MIN/1.73M2
GFR NON-AFRICAN AMERICAN: >60 ML/MIN/1.73M2
GLUCOSE BLD-MCNC: 87 MG/DL (ref 70–99)
HCT VFR BLD CALC: 38.7 % (ref 37–47)
HEMOGLOBIN: 12.8 GM/DL (ref 12.5–16)
IMMATURE NEUTROPHIL %: 0.4 % (ref 0–0.43)
INTERPRETATION: NORMAL
LYMPHOCYTES ABSOLUTE: 2.4 K/CU MM
LYMPHOCYTES RELATIVE PERCENT: 28.5 % (ref 24–44)
MCH RBC QN AUTO: 28.2 PG (ref 27–31)
MCHC RBC AUTO-ENTMCNC: 33.1 % (ref 32–36)
MCV RBC AUTO: 85.2 FL (ref 78–100)
MONOCYTES ABSOLUTE: 0.4 K/CU MM
MONOCYTES RELATIVE PERCENT: 5.3 % (ref 0–4)
PDW BLD-RTO: 13.2 % (ref 11.7–14.9)
PLATELET # BLD: 278 K/CU MM (ref 140–440)
PMV BLD AUTO: 11 FL (ref 7.5–11.1)
POTASSIUM SERPL-SCNC: 3.7 MMOL/L (ref 3.5–5.1)
PREGNANCY, URINE: NEGATIVE
RBC # BLD: 4.54 M/CU MM (ref 4.2–5.4)
SEGMENTED NEUTROPHILS ABSOLUTE COUNT: 5.3 K/CU MM
SEGMENTED NEUTROPHILS RELATIVE PERCENT: 64.2 % (ref 36–66)
SODIUM BLD-SCNC: 134 MMOL/L (ref 135–145)
SPECIFIC GRAVITY, URINE: 1.01 (ref 1–1.03)
TOTAL IMMATURE NEUTOROPHIL: 0.03 K/CU MM
WBC # BLD: 8.3 K/CU MM (ref 4–10.5)

## 2021-04-26 PROCEDURE — 74176 CT ABD & PELVIS W/O CONTRAST: CPT

## 2021-04-26 PROCEDURE — 99284 EMERGENCY DEPT VISIT MOD MDM: CPT

## 2021-04-26 PROCEDURE — 80048 BASIC METABOLIC PNL TOTAL CA: CPT

## 2021-04-26 PROCEDURE — 85025 COMPLETE CBC W/AUTO DIFF WBC: CPT

## 2021-04-26 PROCEDURE — 81025 URINE PREGNANCY TEST: CPT

## 2021-04-26 ASSESSMENT — ENCOUNTER SYMPTOMS
NAUSEA: 0
BLOOD IN STOOL: 0
VOMITING: 0
ABDOMINAL PAIN: 1
CONSTIPATION: 0
DIARRHEA: 1
ANAL BLEEDING: 0
RESPIRATORY NEGATIVE: 1
ABDOMINAL DISTENTION: 0
RECTAL PAIN: 0

## 2021-04-26 ASSESSMENT — PAIN DESCRIPTION - DESCRIPTORS: DESCRIPTORS: ACHING;DULL

## 2021-04-26 ASSESSMENT — PAIN DESCRIPTION - ORIENTATION: ORIENTATION: LEFT;MID

## 2021-04-26 ASSESSMENT — PAIN DESCRIPTION - PAIN TYPE: TYPE: ACUTE PAIN

## 2021-04-26 NOTE — ED PROVIDER NOTES
Triage Chief Complaint:   Abdominal Pain (left mid abd pain. Onset Friday. States (hx colitis. On ATB for tooth infection))    [place]:  Mi Ponce is a 30 y.o. female that presents to the ED with a complaint of abdominal pain diarrhea. Sarahy Moctezuma is a very pleasant 69-year-old female who has had a cholecystectomy D&C presents to the ED with some discomfort of left upper quadrant that began Saturday she has 4-5 episodes of diarrhea today she is only had 1 in the past 24 hours she is on day 4 questionable day 5 of amoxicillin for tooth infection by a local dentist.  She has never had C. difficile no travel no ill contacts. She states her stools are liquid soft yellow and foul-smelling. No blood no mucus.     Past Medical History:   Diagnosis Date    Cancer [facility])     path report from thyroid bx- + papillary carcinoma 2016- tx with radioactive iodine and surgery\"- follows with Dr Marnie Sanchez Enlarged thyroid     for bx 2016    Heart abnormality     heart murmur    Kidney stone     \"use to see Dr Jodi Leon, last kidney stone over a year ago, started approx 4 yrs ago- passed some in past\"    Murmur, heart     \"told I had this with pregnancy in , no echo, do have occ palpitations when lay on right side\"    PONV (postoperative nausea and vomiting)     denies any motion sickness     Past Surgical History:   Procedure Laterality Date    ABDOMEN SURGERY      uterine suspension,      SECTION      CHOLECYSTECTOMY      CYSTOSCOPY  2012    LAPAROSCOPY      x5- last one     MYRINGOTOMY AND TYMPANOSTOMY TUBE PLACEMENT      as a child    THYROIDECTOMY  10/17/2016    total thyroidectomy     TUBAL LIGATION  2016    UTERINE SUSPENSION       Family History   Problem Relation Age of Onset    Arthritis Mother     High Blood Pressure Mother     Other Mother         lupus & fibromyalgia    Arthritis Father     Mental Retardation Paternal Uncle     Arthritis Maternal Grandmother     Diabetes Maternal Grandmother     High Blood Pressure Maternal Grandmother     Other Maternal Grandmother         fibromyalgia    Arthritis Maternal Grandfather     Diabetes Maternal Grandfather     High Blood Pressure Maternal Grandfather     Arthritis Paternal Grandmother     Heart Attack Paternal Grandmother     Arthritis Paternal Grandfather     Heart Attack Paternal Grandfather      Social History     Socioeconomic History    Marital status:      Spouse name: Not on file    Number of children: Not on file    Years of education: Not on file    Highest education level: Not on file   Occupational History    Not on file   Social Needs    Financial resource strain: Not on file    Food insecurity     Worry: Not on file     Inability: Not on file   [employer] needs     Medical: Not on file     Non-medical: Not on file   Tobacco Use    Smoking status: Current Every Day Smoker     Packs/day: 0.50     Years: 12.00     Pack years: 6.00     Types: Cigarettes    Smokeless tobacco: Never Used    Tobacco comment: \"trying to quit\"   Substance and Sexual Activity    Alcohol use: No    Drug use: Yes     Types: Marijuana     Comment: medical marijuana    Sexual activity: Yes     Partners: Male   Lifestyle    Physical activity     Days per week: Not on file     Minutes per session: Not on file    Stress: Not on file   Relationships    Social connections     Talks on phone: Not on file     Gets together: Not on file     Attends Denominational service: Not on file     Active member of club or organization: Not on file     Attends meetings of clubs or organizations: Not on file     Relationship status: Not on file    Intimate partner violence     Fear of current or ex partner: Not on file     Emotionally abused: Not on file     Physically abused: Not on file     Forced sexual activity: Not on file   Other Topics Concern    Not on file   Social History Narrative    Not on file     No current Mouth/Throat:      Mouth: Mucous membranes are moist.   Eyes:      General: No scleral icterus. Right eye: No discharge. Left eye: No discharge. Conjunctiva/sclera: Conjunctivae normal.      Pupils: Pupils are equal, round, and reactive to light. Neck:      Musculoskeletal: Normal range of motion and neck supple. Thyroid: No thyromegaly. Vascular: No JVD. Trachea: No tracheal deviation. Cardiovascular:      Rate and Rhythm: Normal rate and regular rhythm. Heart sounds: Normal heart sounds. No murmur. No friction rub. No gallop. Pulmonary:      Effort: Pulmonary effort is normal. No respiratory distress. Breath sounds: Normal breath sounds. No stridor. No wheezing or rales. Chest:      Chest wall: No tenderness. Abdominal:      General: Abdomen is flat. A surgical scar is present. Bowel sounds are normal. There is no distension. Palpations: Abdomen is soft. There is no mass. Tenderness: There is no abdominal tenderness. There is left CVA tenderness. There is no guarding or rebound. Hernia: No hernia is present. Musculoskeletal: Normal range of motion. General: No tenderness or deformity. Lymphadenopathy:      Cervical: No cervical adenopathy. Skin:     General: Skin is warm and dry. Capillary Refill: Capillary refill takes less than 2 seconds. Coloration: Skin is not pale. Findings: No erythema or rash. Neurological:      Mental Status: She is alert and oriented to person, place, and time. Cranial Nerves: No cranial nerve deficit. Sensory: No sensory deficit. Deep Tendon Reflexes: Reflexes are normal and symmetric. Reflexes normal.   Psychiatric:         Speech: Speech normal.         Behavior: Behavior normal.         Thought Content:  Thought content normal.         Judgment: Judgment normal.         I have reviewed and interpreted all of the currently available lab results from this visit (ifapplicable):  Results for orders placed or performed during the hospital encounter of 04/26/21   Pregnancy, Urine   Result Value Ref Range    Pregnancy, Urine NEGATIVE NEGATIVE    Specific Gravity, Urine 1.015 1.001 - 1.035    Interpretation HCG METHOD LIMITATIONS:    CBC Auto Differential   Result Value Ref Range    WBC 8.3 4.0 - 10.5 K/CU MM    RBC 4.54 4.2 - 5.4 M/CU MM    Hemoglobin 12.8 12.5 - 16.0 GM/DL    Hematocrit 38.7 37 - 47 %    MCV 85.2 78 - 100 FL    MCH 28.2 27 - 31 PG    MCHC 33.1 32.0 - 36.0 %    RDW 13.2 11.7 - 14.9 %    Platelets 951 650 - 207 K/CU MM    MPV 11.0 7.5 - 11.1 FL    Differential Type AUTOMATED DIFFERENTIAL     Segs Relative 64.2 36 - 66 %    Lymphocytes % 28.5 24 - 44 %    Monocytes % 5.3 (H) 0 - 4 %    Eosinophils % 1.1 0 - 3 %    Basophils % 0.5 0 - 1 %    Segs Absolute 5.3 K/CU MM    Lymphocytes Absolute 2.4 K/CU MM    Monocytes Absolute 0.4 K/CU MM    Eosinophils Absolute 0.1 K/CU MM    Basophils Absolute 0.0 K/CU MM    Immature Neutrophil % 0.4 0 - 0.43 %    Total Immature Neutrophil 0.03 K/CU MM   Basic Metabolic Panel w/ Reflex to MG   Result Value Ref Range    Sodium 134 (L) 135 - 145 MMOL/L    Potassium 3.7 3.5 - 5.1 MMOL/L    Chloride 99 99 - 110 mMol/L    CO2 23 21 - 32 MMOL/L    Anion Gap 12 4 - 16    BUN 9 6 - 23 MG/DL    CREATININE 0.6 0.6 - 1.1 MG/DL    Glucose 87 70 - 99 MG/DL    Calcium 9.0 8.3 - 10.6 MG/DL    GFR Non-African American >60 >60 mL/min/1.73m2    GFR African American >60 >60 mL/min/1.73m2      Radiographs (if obtained):  [] The following radiograph wasinterpreted by myself in the absence of a radiologist:   [] Radiologist's Report Reviewed:  CT ABDOMEN PELVIS WO CONTRAST Additional Contrast? None   Final Result   No mass, ascites or lymphadenopathy. No colitis, diverticulitis, appendicitis or enteritis were noted. No renal mass, hydronephrosis, renal or ureteral calculi.                EKG (if obtained): (All EKG's are interpreted by myself in the absence of a cardiologist)    Chart review shows recent radiographs:  No results found. MDM:    Patient presented with diarrhea illness currently on amoxicillin most likely this is antibiotic associated diarrhea however she did have a fever CT was obtained Talya Vázquez reveals no pathological findings despite her having diarrhea at home she cannot give a specimen over the several hours in the ED. She will be this sent home for oral hydration recommended to stop her antibiotic    Please note that portions of this note may have been completed with a voice recognition/dictation program. Efforts were made to edit the dictations but occasionally words are mis-transcribed.      All pertinent Lab data and radiographic results reviewed with patient at bedside. The patient and/or the family were informed of the results of any tests/labs/imaging, the treatment plan, and time was allotted to answer questions. See chart for details of medications given during the ED stay.     The likelihood of other entities in the differential is insufficient to justify any further testing for them. This was explained to the patient. The patient was advised that persistent or worsening symptoms would require further evaluation.                Clinical Impression:  1. Diarrhea, unspecified type    2. Antibiotic-associated diarrhea      Disposition referral (if applicable):  Eloisa Pickett MD  [address] [place] [address]  [phone]      If symptoms worsen    Disposition medications (if applicable):  New Prescriptions    No medications on file           Manolo Whitehead DO, FACEP      Comment: Please note this report has been produced using speech recognition software and maycontain errors related to that system including errors in grammar, punctuation, and spelling, as well as words and phrases that may be inappropriate.  If there are any questions or concerns please feel free to contact thedictating provider for clarification.         Graeme Primer, DO  04/26/21 1220

## 2021-04-26 NOTE — ED NOTES
Pt resting quietly in bed. Talking on cell phone. Voicing no complaints.       Anne Marie Owen RN  04/26/21 [id number]

## 2021-04-26 NOTE — ED NOTES
Saline lock dc'd. Discharge instructions given to pt. Pt given specimen hat and specimen cup to obtain stool specimen. Pt instructed to follow up with her family dr and to return to ER if any problems or concerns. Pt verbalizes understanding.  Pt discharged ambulatory     Meghna Roca RN  04/26/21 [id number]

## 2021-05-05 ENCOUNTER — HOSPITAL ENCOUNTER (OUTPATIENT)
Age: 36
Discharge: HOME | End: 2021-05-05
Payer: COMMERCIAL

## 2021-05-05 ENCOUNTER — OFFICE VISIT (OUTPATIENT)
Dept: INTERNAL MEDICINE CLINIC | Age: 36
End: 2021-05-05
Payer: COMMERCIAL

## 2021-05-05 VITALS
OXYGEN SATURATION: 100 % | BODY MASS INDEX: 22.09 KG/M2 | WEIGHT: 117 LBS | DIASTOLIC BLOOD PRESSURE: 70 MMHG | HEIGHT: 61 IN | SYSTOLIC BLOOD PRESSURE: 105 MMHG | HEART RATE: 72 BPM

## 2021-05-05 DIAGNOSIS — D50.0 IRON DEFICIENCY ANEMIA DUE TO CHRONIC BLOOD LOSS: ICD-10-CM

## 2021-05-05 DIAGNOSIS — R10.12 LUQ ABDOMINAL PAIN: ICD-10-CM

## 2021-05-05 DIAGNOSIS — R19.5 CHANGE IN STOOL CALIBER: Primary | ICD-10-CM

## 2021-05-05 DIAGNOSIS — C73 MALIGNANT NEOPLASM OF THYROID GLAND (HCC): ICD-10-CM

## 2021-05-05 DIAGNOSIS — E89.0 POST-SURGICAL HYPOTHYROIDISM: ICD-10-CM

## 2021-05-05 LAB
ALBUMIN SERPL-MCNC: 4.2 GM/DL (ref 3.4–5)
ALP BLD-CCNC: 56 IU/L (ref 40–129)
ALT SERPL-CCNC: 13 U/L (ref 10–40)
ANION GAP SERPL CALCULATED.3IONS-SCNC: 6 MMOL/L (ref 4–16)
AST SERPL-CCNC: 17 IU/L (ref 15–37)
BILIRUB SERPL-MCNC: 0.2 MG/DL (ref 0–1)
BUN BLDV-MCNC: 8 MG/DL (ref 6–23)
CALCIUM SERPL-MCNC: 8.5 MG/DL (ref 8.3–10.6)
CHLORIDE BLD-SCNC: 96 MMOL/L (ref 99–110)
CO2: 26 MMOL/L (ref 21–32)
CREAT SERPL-MCNC: 0.5 MG/DL (ref 0.6–1.1)
GFR AFRICAN AMERICAN: >60 ML/MIN/1.73M2
GFR NON-AFRICAN AMERICAN: >60 ML/MIN/1.73M2
GLUCOSE FASTING: 80 MG/DL (ref 70–99)
LIPASE: 51 IU/L (ref 13–60)
POTASSIUM SERPL-SCNC: 3.8 MMOL/L (ref 3.5–5.1)
SODIUM BLD-SCNC: 128 MMOL/L (ref 135–145)
TOTAL PROTEIN: 6.5 GM/DL (ref 6.4–8.2)

## 2021-05-05 PROCEDURE — G8420 CALC BMI NORM PARAMETERS: HCPCS | Performed by: INTERNAL MEDICINE

## 2021-05-05 PROCEDURE — 36415 COLL VENOUS BLD VENIPUNCTURE: CPT

## 2021-05-05 PROCEDURE — 83690 ASSAY OF LIPASE: CPT

## 2021-05-05 PROCEDURE — 99204 OFFICE O/P NEW MOD 45 MIN: CPT | Performed by: INTERNAL MEDICINE

## 2021-05-05 PROCEDURE — 4004F PT TOBACCO SCREEN RCVD TLK: CPT | Performed by: INTERNAL MEDICINE

## 2021-05-05 PROCEDURE — 80053 COMPREHEN METABOLIC PANEL: CPT

## 2021-05-05 PROCEDURE — G8427 DOCREV CUR MEDS BY ELIG CLIN: HCPCS | Performed by: INTERNAL MEDICINE

## 2021-05-05 ASSESSMENT — PATIENT HEALTH QUESTIONNAIRE - PHQ9: DEPRESSION UNABLE TO ASSESS: URGENT/EMERGENT SITUATION

## 2021-05-05 NOTE — PROGRESS NOTES
5/5/21    Mi Powers  1985    Chief Complaint   Patient presents with    Other     ed fu - patient stated she is feeling worse in alot of pain       History of Present Illness:  Renee Hager is a 30 y.o. pleasant lady presenting today to establish care as a new patient with a chief complaint of LUQ abdominal pain, change in bowel habits. She has a past medical history significant for:  Papillary thyroid carcinoma pT2 pNx pMx s/p total thyroidectomy in 2016 (TSH 0.037 on 4/19/2021), on Synthroid 100mcg QD, Liothyronine 5mcg BID   Iron deficiency anemia 2/2 menorrhagia, off ferrous sulfate due to constipation  Migraine headaches   Anxiety   Fibromyalgia, on medical marijuana   Insomnia, on Melatonin QHS PRN   Endometriosis, on Sprintec   H/o nephrolithiasis  S/p cholecystectomy   S/p tubal ligation  S/p uterine suspension     # Follows with Dr. Leandra Mcmullen for MOMO as well as PTC. She is on iron infusions. Also follows at [facility]. She is on Synthroid after total thyroidectomy. No RT. She is also on T3.   TSH low in April 2021. Per [facility] notes with Endocrinology, will continue same dose. # Patient was in ER on 4/26 for 4-day history of diarrhea. This was thought to be due to Amoxicillin. She was told to stop antibiotics. Since then feels her symptoms have worsened. She is having change in bowel habits and stool caliber. Having worsening pain as well: LUQ radiating to the back. Pain was intermittent but now is constant. Pain is stabbing in nature. Has nausea. Poor appetite. No vomiting. In ED CBC, BMP, pregnancy test unremarkable. CT A/P in ED was unremarkable. Patient has h/o endometriosis. She is having irregular menstrual cycle. Taking Tylenol and Ibuprofen PRN. Not helping. Has had this pain in the past but self-resolved with no known etiology. Maternal grandmother and mother with ovarian cancer.    Has h/o mono with splenic involvement > 8 years ago and was in critical condition at the time per patient. Health maintenance:   Health Maintenance Due   Topic Date Due    Hepatitis C screen  Never done    Varicella vaccine (1 of 2 - 2-dose childhood series) Never done    Pneumococcal 0-64 years Vaccine (1 of 1 - PPSV23) Never done    HIV screen  Never done    COVID-19 Vaccine (1) Never done    DTaP/Tdap/Td vaccine (1 - Tdap) Never done    Cervical cancer screen  Never done         Review of Systems:  Constitutional: no fevers, no chills, no night sweats, no weight loss, no weight gain, no fatigue   Head: no headaches  Ears: no hearing loss, no tinnitus, no vertigo  Eyes: no blurry vision, no diplopia, no dryness, no itchiness  Mouth: no oral ulcers, no dry mouth, no sore throat  Nose: no nasal congestion, no epistaxis  Cardiac: no chest pain, no palpitations, no leg swelling, no orthopnea, no PND, no syncope  Pulmonary: no dyspnea, no cough, no wheezing, no hemoptysis  GI: no nausea, no vomiting, change in stool caliber, LUQ abdominal pain radiating to back, no hematochezia  : no dysuria, no frequency, no urgency, no hematuria, no frothy urine, no dyspareunia, no pelvic pain, no vaginal bleeding, no abnormal vaginal discharge  MSK: no arthralgias, no myalgias, no early morning stiffness, no Raynaud's   Neuro: no focal neurological deficits, no seizures  Sleep: no snoring, no daytime somnolence   Psych: no depression, no anxiety, no suicidal ideation      Physical Exam:  VITALS:   /70   Pulse 72   Ht 5' 1\" (1.549 m)   Wt 117 lb (53.1 kg)   SpO2 100%   BMI 22.11 kg/m²     PHYSICAL EXAMINATION:  General: alert, awake, and oriented to time, place, person, and situation. Not in acute distress   Skin:  no suspicious rashes, no jaundice  Head: normocephalic/atraumatic  Eyes: anicteric sclera, well-injected conjunctiva. Pupils are equally round and reactive to light.  Extraocular movements are intact   Nose: no septal deviation evident  Sinuses: no sinus tenderness  Ears: external ears normal  Neck: supple, no cervical lymphadenopathy, thyroid symmetric and not enlarged, no bruits   Heart: regular rate and rhythm, regular S1/S2, no S3/S4, no audible murmurs, no audible friction rub  Lungs: clear to auscultation bilaterally, no audible crackles, no audible wheezes, no audible rhonchi    Abdomen: normal bowel sounds, soft abdomen, tender in LUQ area, no palpable masses  Extremities: no edema, warm, no cyanosis, no clubbing. Good capillary refill   MSK: no tenderness across spinous processes, full ROM in all 4 extremities. No joint swelling or tenderness   Peripheral vascular: 2+ pulses symmetric (radial)  Neuro: gait normal, CN II-XII intact, motor power 5/5 in all 4 extremities, sensation intact and symmetric    Labs   I have personally reviewed labs, and discussed pertinent findings with patient     Imaging   I have personally reviewed imaging, and discussed pertinent findings with patient    Other notes  I have personally reviewed other notes, and discussed pertinent findings with patient      Assessment/Plan:     1. Change in stool caliber  Unclear etiology  Patient is extremely anxious   Worsening symptoms  - [facility] - Jessi Avila MD, Gastroenterology, [place]    2. LUQ abdominal pain  Will check lipase   Spleen appeared unremarkable on CT so will not get US   DDx: endometriosis, colon pathology, shingles. No rash. Discussed with patient that she needs to watch for rash. Low suspicion for MSK? Discussed with patient that she needs to follow with Gyn for endometriosis   - Favian Javed MD, Gastroenterology, [address]; Future  - LIPASE; Future    3. Malignant neoplasm of thyroid gland (HCC)  Papillary thyroid carcinoma pT2 pNx pMx s/p total thyroidectomy in 2016  Following with Dr. Simon Hathaway with Endocrinology [facility]    4.  Post-surgical hypothyroidism  TSH low in April 2021  Per notes from tel encounter [address] Endocrinology to continue same: Synthroid 100mcg QD, Liothyronine 5mcg BID    5. Iron deficiency anemia due to chronic blood loss  Following with Dr. John Horne for iron infusions  Intolerant to iron supplements PO       Care discussed with patient and questions answered. Patient verbalizes understanding and agrees with plan. Discussed with patient the importance of continuity of care. I encouraged patient to schedule next appointment within 1 month with me. Patient prefers to be reached by Phone call at [phone] for future medical correspondence. Encouraged to activate [employer]. I reviewed and reconciled the medications this visit. I reviewed and updated the past medical, surgical, social, and family history during this visit. After visit summary provided.        Oli Conroy MD  Internal Medicine  5/5/2021   11:56 AM

## 2021-05-06 ENCOUNTER — TELEPHONE (OUTPATIENT)
Dept: INTERNAL MEDICINE CLINIC | Age: 36
End: 2021-05-06

## 2021-05-30 ENCOUNTER — HOSPITAL ENCOUNTER (EMERGENCY)
Age: 36
Discharge: HOME | End: 2021-05-30
Attending: EMERGENCY MEDICINE
Payer: COMMERCIAL

## 2021-05-30 VITALS
HEIGHT: 61 IN | WEIGHT: 108 LBS | SYSTOLIC BLOOD PRESSURE: 120 MMHG | DIASTOLIC BLOOD PRESSURE: 54 MMHG | HEART RATE: 65 BPM | TEMPERATURE: 98.9 F | RESPIRATION RATE: 16 BRPM | BODY MASS INDEX: 20.39 KG/M2 | OXYGEN SATURATION: 100 %

## 2021-05-30 DIAGNOSIS — R74.8 ELEVATED LIPASE: ICD-10-CM

## 2021-05-30 DIAGNOSIS — R11.2 NAUSEA AND VOMITING, INTRACTABILITY OF VOMITING NOT SPECIFIED, UNSPECIFIED VOMITING TYPE: ICD-10-CM

## 2021-05-30 DIAGNOSIS — R10.12 LEFT UPPER QUADRANT ABDOMINAL PAIN: Primary | ICD-10-CM

## 2021-05-30 LAB
ALBUMIN SERPL-MCNC: 4.2 GM/DL (ref 3.4–5)
ALP BLD-CCNC: 58 IU/L (ref 40–129)
ALT SERPL-CCNC: 15 U/L (ref 10–40)
ANION GAP SERPL CALCULATED.3IONS-SCNC: 5 MMOL/L (ref 4–16)
AST SERPL-CCNC: 15 IU/L (ref 15–37)
BACTERIA: ABNORMAL /HPF
BASOPHILS ABSOLUTE: 0 K/CU MM
BASOPHILS RELATIVE PERCENT: 0.5 % (ref 0–1)
BILIRUB SERPL-MCNC: 0.2 MG/DL (ref 0–1)
BILIRUBIN URINE: NEGATIVE MG/DL
BLOOD, URINE: ABNORMAL
BUN BLDV-MCNC: 9 MG/DL (ref 6–23)
CALCIUM SERPL-MCNC: 8.9 MG/DL (ref 8.3–10.6)
CAST TYPE: NEGATIVE /HPF
CHLORIDE BLD-SCNC: 104 MMOL/L (ref 99–110)
CLARITY: ABNORMAL
CO2: 28 MMOL/L (ref 21–32)
COLOR: YELLOW
CREAT SERPL-MCNC: 0.6 MG/DL (ref 0.6–1.1)
CRYSTAL TYPE: NEGATIVE /HPF
DIFFERENTIAL TYPE: ABNORMAL
EOSINOPHILS ABSOLUTE: 0.3 K/CU MM
EOSINOPHILS RELATIVE PERCENT: 3.3 % (ref 0–3)
EPITHELIAL CELLS, UA: ABNORMAL /HPF
GFR AFRICAN AMERICAN: >60 ML/MIN/1.73M2
GFR NON-AFRICAN AMERICAN: >60 ML/MIN/1.73M2
GLUCOSE BLD-MCNC: 78 MG/DL (ref 70–99)
GLUCOSE, URINE: NEGATIVE MG/DL
HCT VFR BLD CALC: 37.3 % (ref 37–47)
HEMOGLOBIN: 12.6 GM/DL (ref 12.5–16)
IMMATURE NEUTROPHIL %: 0.4 % (ref 0–0.43)
INTERPRETATION: NORMAL
KETONES, URINE: NEGATIVE MG/DL
LEUKOCYTE ESTERASE, URINE: NEGATIVE
LIPASE: 87 IU/L (ref 13–60)
LYMPHOCYTES ABSOLUTE: 2.2 K/CU MM
LYMPHOCYTES RELATIVE PERCENT: 27.2 % (ref 24–44)
MCH RBC QN AUTO: 29.4 PG (ref 27–31)
MCHC RBC AUTO-ENTMCNC: 33.8 % (ref 32–36)
MCV RBC AUTO: 86.9 FL (ref 78–100)
MONOCYTES ABSOLUTE: 0.6 K/CU MM
MONOCYTES RELATIVE PERCENT: 7 % (ref 0–4)
NITRITE URINE, QUANTITATIVE: NEGATIVE
PDW BLD-RTO: 13.3 % (ref 11.7–14.9)
PH, URINE: 6 (ref 5–8)
PLATELET # BLD: 204 K/CU MM (ref 140–440)
PMV BLD AUTO: 10.9 FL (ref 7.5–11.1)
POTASSIUM SERPL-SCNC: 3.8 MMOL/L (ref 3.5–5.1)
PREGNANCY, URINE: NEGATIVE
PROTEIN UA: NEGATIVE MG/DL
RBC # BLD: 4.29 M/CU MM (ref 4.2–5.4)
RBC URINE: ABNORMAL /HPF (ref 0–6)
SEGMENTED NEUTROPHILS ABSOLUTE COUNT: 5.1 K/CU MM
SEGMENTED NEUTROPHILS RELATIVE PERCENT: 61.6 % (ref 36–66)
SODIUM BLD-SCNC: 137 MMOL/L (ref 135–145)
SPECIFIC GRAVITY UA: 1.03 (ref 1–1.03)
SPECIFIC GRAVITY, URINE: 1.03 (ref 1–1.03)
TOTAL IMMATURE NEUTOROPHIL: 0.03 K/CU MM
TOTAL PROTEIN: 6.5 GM/DL (ref 6.4–8.2)
UROBILINOGEN, URINE: 0.2 MG/DL (ref 0.2–1)
WBC # BLD: 8.2 K/CU MM (ref 4–10.5)
WBC UA: NEGATIVE /HPF (ref 0–5)

## 2021-05-30 PROCEDURE — 81025 URINE PREGNANCY TEST: CPT

## 2021-05-30 PROCEDURE — 6360000002 HC RX W HCPCS: Performed by: EMERGENCY MEDICINE

## 2021-05-30 PROCEDURE — 83690 ASSAY OF LIPASE: CPT

## 2021-05-30 PROCEDURE — 96374 THER/PROPH/DIAG INJ IV PUSH: CPT

## 2021-05-30 PROCEDURE — 2580000003 HC RX 258: Performed by: EMERGENCY MEDICINE

## 2021-05-30 PROCEDURE — C9113 INJ PANTOPRAZOLE SODIUM, VIA: HCPCS | Performed by: EMERGENCY MEDICINE

## 2021-05-30 PROCEDURE — 85025 COMPLETE CBC W/AUTO DIFF WBC: CPT

## 2021-05-30 PROCEDURE — 81001 URINALYSIS AUTO W/SCOPE: CPT

## 2021-05-30 PROCEDURE — 96375 TX/PRO/DX INJ NEW DRUG ADDON: CPT

## 2021-05-30 PROCEDURE — 80053 COMPREHEN METABOLIC PANEL: CPT

## 2021-05-30 PROCEDURE — 99282 EMERGENCY DEPT VISIT SF MDM: CPT

## 2021-05-30 RX ORDER — PROCHLORPERAZINE MALEATE 10 MG
10 TABLET ORAL EVERY 6 HOURS PRN
Qty: 20 TABLET | Refills: 0 | Status: SHIPPED | OUTPATIENT
Start: 2021-05-30 | End: 2021-10-28

## 2021-05-30 RX ORDER — HYDROCODONE BITARTRATE AND ACETAMINOPHEN 5; 325 MG/1; MG/1
1 TABLET ORAL EVERY 6 HOURS PRN
Qty: 6 TABLET | Refills: 0 | Status: SHIPPED | OUTPATIENT
Start: 2021-05-30 | End: 2021-06-02

## 2021-05-30 RX ORDER — PROCHLORPERAZINE EDISYLATE 5 MG/ML
10 INJECTION INTRAMUSCULAR; INTRAVENOUS ONCE
Status: COMPLETED | OUTPATIENT
Start: 2021-05-30 | End: 2021-05-30

## 2021-05-30 RX ORDER — PANTOPRAZOLE SODIUM 40 MG/1
40 TABLET, DELAYED RELEASE ORAL
Qty: 30 TABLET | Refills: 5 | Status: SHIPPED | OUTPATIENT
Start: 2021-05-30 | End: 2021-10-28

## 2021-05-30 RX ORDER — PANTOPRAZOLE SODIUM 40 MG/10ML
40 INJECTION, POWDER, LYOPHILIZED, FOR SOLUTION INTRAVENOUS ONCE
Status: COMPLETED | OUTPATIENT
Start: 2021-05-30 | End: 2021-05-30

## 2021-05-30 RX ORDER — 0.9 % SODIUM CHLORIDE 0.9 %
1000 INTRAVENOUS SOLUTION INTRAVENOUS ONCE
Status: COMPLETED | OUTPATIENT
Start: 2021-05-30 | End: 2021-05-30

## 2021-05-30 RX ADMIN — PROCHLORPERAZINE EDISYLATE 10 MG: 5 INJECTION INTRAMUSCULAR; INTRAVENOUS at 16:35

## 2021-05-30 RX ADMIN — SODIUM CHLORIDE 1000 ML: 9 INJECTION, SOLUTION INTRAVENOUS at 16:36

## 2021-05-30 RX ADMIN — PANTOPRAZOLE SODIUM 40 MG: 40 INJECTION, POWDER, FOR SOLUTION INTRAVENOUS at 16:35

## 2021-05-30 ASSESSMENT — PAIN DESCRIPTION - LOCATION: LOCATION: ABDOMEN;RIB CAGE

## 2021-05-30 ASSESSMENT — PAIN DESCRIPTION - PAIN TYPE: TYPE: ACUTE PAIN

## 2021-05-30 ASSESSMENT — PAIN DESCRIPTION - ORIENTATION: ORIENTATION: LEFT

## 2021-05-30 ASSESSMENT — PAIN SCALES - GENERAL: PAINLEVEL_OUTOF10: 5

## 2021-05-30 NOTE — ED PROVIDER NOTES
Emergency Department Encounter  Location: [facility] [address] [place] [address]    Patient: Maxcine Castleman  MRN: [id number]  : 1985  Date of evaluation: 2021  ED Provider: Dave Lockhart DO, FACEP    Chief Complaint:    Abdominal Pain ( has been seen here and at [place] for the same thing.  has an appointment with her family dr and a GI dr but states they are not soon.  is losing weight. pain under left rib into her back. states having trouble having BM's.  has been vomitng \"bile\". states she is afraid she isn't going to make it until her appointments)    [place]:  Maxcine Castleman is a 30 y.o. female that presents to the emergency department with complaints of pain in her left upper quadrant radiating under her ribs. The patient states this has been present for about a month. She states food makes it worse. She states that she was having diarrhea initially when this started and was seen here in the emergency department by Dr. Rosaura Cortez. He recommended that she stopped amoxicillin that she was on for an unrelated problem and she states since that time she has developed constipation. The patient states she was extremely constipated but now is starting to have loose stools but not diarrhea. She also vomited Friday. She states she is vomiting of bilious material.  She presents to the emergency department after losing weight. She states she started in 123 pounds and now is at 108 pounds because anytime she eats it makes the pain worse. She saw her primary caregiver and she has an appointment to see a GI specialist in 1 week. She presents to the emergency department today because she is now vomiting. She states she was given Zofran when she saw the emergency department at [facility] for the same problem. She states she will not use it because it makes her constipated.   She states her urine has been exceedingly dark even though she is drinking 6-8 bottles of water daily.      ROS - see HPI, below listed is current ROS at time of my eval:  At least 10 systems reviewed and otherwise acutely negative except as in the [address]   General:  No fevers, no chills, no weakness  Eyes:  No recent vison changes, no discharge  ENT:  No sore throat, no nasal congestion, no hearing changes  Cardiovascular:  No chest pain, no palpitations  Respiratory:  No shortness of breath, no cough, no wheezing  Gastrointestinal: Positive for pain with nausea vomiting constipation and diarrhea  Musculoskeletal:  No muscle pain, no joint pain  Skin:  No rash, no pruritis, no easy bruising  Neurologic:  No speech problems, no headache, no extremity numbness, no extremity tingling, no extremity weakness  Psychiatric:  No anxiety  Genitourinary:  No dysuria, no hematuria  Endocrine:  No unexpected weight gain, positive for unexpected weight loss  Extremities:  no edema, no pain    Past Medical History:   Diagnosis Date    Cancer [facility])     path report from thyroid bx- + papillary carcinoma 2016- tx with radioactive iodine and surgery\"- follows with Dr Ximena Marquez Enlarged thyroid     for bx 2016    Heart abnormality     heart murmur    Kidney stone     \"use to see Dr Melissa Sorensen, last kidney stone over a year ago, started approx 4 yrs ago- passed some in past\"    Murmur, heart     \"told I had this with pregnancy in , no echo, do have occ palpitations when lay on right side\"    PONV (postoperative nausea and vomiting)     denies any motion sickness     Past Surgical History:   Procedure Laterality Date    ABDOMEN SURGERY      uterine suspension,      SECTION      CHOLECYSTECTOMY      CYSTOSCOPY  2012    LAPAROSCOPY      x5- last one     MYRINGOTOMY AND TYMPANOSTOMY TUBE PLACEMENT      as a child    THYROIDECTOMY  10/17/2016    total thyroidectomy     TUBAL LIGATION  2016    UTERINE SUSPENSION       Family History   Problem Relation Age of Onset    Arthritis Mother     High Blood Pressure Mother     Other Mother         lupus & fibromyalgia    Ovarian Cancer Mother     Arthritis Father     Mental Retardation Paternal Uncle     Arthritis Maternal Grandmother     Diabetes Maternal Grandmother     High Blood Pressure Maternal Grandmother     Other Maternal Grandmother         fibromyalgia    Ovarian Cancer Maternal Grandmother     Arthritis Maternal Grandfather     Diabetes Maternal Grandfather     High Blood Pressure Maternal Grandfather     Arthritis Paternal Grandmother     Heart Attack Paternal Grandmother     Arthritis Paternal Grandfather     Heart Attack Paternal Grandfather      Social History     Socioeconomic History    Marital status:      Spouse name: Not on file    Number of children: Not on file    Years of education: Not on file    Highest education level: Not on file   Occupational History    Not on file   Tobacco Use    Smoking status: Current Every Day Smoker     Packs/day: 0.50     Years: 12.00     Pack years: 6.00     Types: Cigarettes    Smokeless tobacco: Never Used    Tobacco comment: \"trying to quit\"   Vaping Use    Vaping Use: Some days    Substances: Never, THC   Substance and Sexual Activity    Alcohol use: No    Drug use: Yes     Types: Marijuana     Comment: medical marijuana    Sexual activity: Yes     Partners: Male   Other Topics Concern    Not on file   Social History Narrative    Not on file     Social Determinants of Health     Financial Resource Strain:     Difficulty of Paying Living Expenses:    Food Insecurity:     Worried About Running Out of Food in the Last Year:     Ran Out of Food in the Last Year:    Transportation Needs:     Lack of Transportation (Medical):      Lack of Transportation (Non-Medical):    Physical Activity:     Days of Exercise per Week:     Minutes of Exercise per Session:    Stress:     Feeling of Stress :    Social Connections:     Frequency of Communication with Friends and Family:     Frequency of Social Gatherings with Friends and Family:     Attends Shinto Services:     Active Member of Clubs or Organizations:     Attends Club or Organization Meetings:     Marital Status:    Intimate Partner Violence:     Fear of Current or Ex-Partner:     Emotionally Abused:     Physically Abused:     Sexually Abused:      Current Facility-Administered Medications   Medication Dose Route Frequency Provider Last Rate Last Admin    0.9 % sodium chloride bolus  1,000 mL Intravenous Once Bonnee Hand, DO 1,000 mL/hr at 05/30/21 1636 1,000 mL at 05/30/21 1636     Current Outpatient Medications   Medication Sig Dispense Refill    HYDROcodone-acetaminophen (NORCO) 5-325 MG per tablet Take 1 tablet by mouth every 6 hours as needed for Pain for up to 3 days. 6 tablet 0    prochlorperazine (COMPAZINE) 10 MG tablet Take 1 tablet by mouth every 6 hours as needed (nausea, vomiting) 20 tablet 0    pantoprazole (PROTONIX) 40 MG tablet Take 1 tablet by mouth every morning (before breakfast) 30 tablet 5    melatonin 10 MG CAPS capsule Take 30 mg by mouth nightly      medical marijuana Take by mouth as needed.       liothyronine (CYTOMEL) 5 MCG tablet Take 5 mcg by mouth 2 times daily 5 mcg in the am and 2.5mcg in pm      levothyroxine (SYNTHROID) 100 MCG tablet Take 100 mcg by mouth Daily       diphenhydrAMINE (BENADRYL) 25 MG tablet Take 100 mg by mouth      Norgestimate-Eth Estradiol (SPRINTEC 28 PO) Take by mouth       Allergies   Allergen Reactions    Latex Swelling and Rash     Swelling and rash    Ciprofloxacin      Right arm went numb; heart rate racing (was give cipro/flagyl combo at hospital)       Nursing Notes Reviewed    Physical Exam:  ED Triage Vitals   Enc Vitals Group      BP 05/30/21 1530 (!) 120/54      Pulse 05/30/21 1530 65      Resp 05/30/21 1530 16      Temp 05/30/21 1530 98.9 °F (37.2 °C)      Temp Source 05/30/21 1530 Oral      SpO2 05/30/21 1530 100 %      Weight 05/30/21 1526 108 lb (49 kg)      Height 05/30/21 1526 5' 1\" (1.549 m)      Head Circumference --       Peak Flow --       Pain Score --       Pain Loc --       Pain Edu? --       Excl. in [address]? --      GENERAL APPEARANCE: Awake and alert. Cooperative. No acute distress. Nontoxic in appearance  HEAD: Normocephalic. Atraumatic. EYES: EOM's grossly intact. Sclera anicteric. ENT: Tolerates saliva. No trismus. NECK: Supple. Trachea midline. CARDIO: RRR. Radial pulse 2+. LUNGS: Respirations unlabored. CTAB. ABDOMEN: Soft. Non-distended. There is slight tenderness to palpation in the left upper quadrant without guarding or rebound. Bowel sounds are hyperactive in 4 quadrants. EXTREMITIES: No acute deformities. SKIN: Warm and dry. NEUROLOGICAL: No gross facial drooping. Moves all 4 extremities spontaneously. PSYCHIATRIC: Normal mood.      Labs:  Results for orders placed or performed during the hospital encounter of 05/30/21   CBC Auto Differential   Result Value Ref Range    WBC 8.2 4.0 - 10.5 K/CU MM    RBC 4.29 4.2 - 5.4 M/CU MM    Hemoglobin 12.6 12.5 - 16.0 GM/DL    Hematocrit 37.3 37 - 47 %    MCV 86.9 78 - 100 FL    MCH 29.4 27 - 31 PG    MCHC 33.8 32.0 - 36.0 %    RDW 13.3 11.7 - 14.9 %    Platelets 527 057 - 235 K/CU MM    MPV 10.9 7.5 - 11.1 FL    Differential Type AUTOMATED DIFFERENTIAL     Segs Relative 61.6 36 - 66 %    Lymphocytes % 27.2 24 - 44 %    Monocytes % 7.0 (H) 0 - 4 %    Eosinophils % 3.3 (H) 0 - 3 %    Basophils % 0.5 0 - 1 %    Segs Absolute 5.1 K/CU MM    Lymphocytes Absolute 2.2 K/CU MM    Monocytes Absolute 0.6 K/CU MM    Eosinophils Absolute 0.3 K/CU MM    Basophils Absolute 0.0 K/CU MM    Immature Neutrophil % 0.4 0 - 0.43 %    Total Immature Neutrophil 0.03 K/CU MM   Comprehensive Metabolic Panel   Result Value Ref Range    Sodium 137 135 - 145 MMOL/L    Potassium 3.8 3.5 - 5.1 MMOL/L    Chloride 104 99 - 110 mMol/L    CO2 28 21 - 32 MMOL/L    BUN 9 6 - 23 MG/DL be encouraged to stay on a clear liquid diet and to use medications as directed. We will be starting her on some Protonix she has an appointment with the GI specialist in 1 week. She is encouraged to keep that appointment without fail. She will be discharged in stable condition as instructed return if her condition worsens. Final Impression:  1. Left upper quadrant abdominal pain    2. Nausea and vomiting, intractability of vomiting not specified, unspecified vomiting type    3. Elevated lipase      DISPOSITION Decision To Discharge    Patient referred to:  [facility] [address] [id number] [phone]    Schedule an appointment as soon as possible for a visit in 1 week  For follow up    Discharge medications:  New Prescriptions    HYDROCODONE-ACETAMINOPHEN (NORCO) 5-325 MG PER TABLET    Take 1 tablet by mouth every 6 hours as needed for Pain for up to 3 days.     PANTOPRAZOLE (PROTONIX) 40 MG TABLET    Take 1 tablet by mouth every morning (before breakfast)    PROCHLORPERAZINE (COMPAZINE) 10 MG TABLET    Take 1 tablet by mouth every 6 hours as needed (nausea, vomiting)     (Please note that portions of this note may have been completed with a voice recognition program. Efforts were made to edit the dictations but occasionally words are mis-transcribed.)    Shawna Murcia DO, [address] [facility]  Board certified in [address] [place] [address]        Shawna Murcia, [place]  05/30/21 5328

## 2021-05-30 NOTE — ED NOTES
Discharge instructions reviewed; patient verbalized understanding; patient transferred from ED via private vehicle by family.       Tamica Dolan RN  05/30/21 0099

## 2021-06-01 ENCOUNTER — TELEPHONE (OUTPATIENT)
Dept: INTERNAL MEDICINE CLINIC | Age: 36
End: 2021-06-01

## 2021-06-01 NOTE — TELEPHONE ENCOUNTER
[facility] Transitions Initial Follow Up Call    Outreach made within 2 business days of discharge: Yes    Patient: Maxcine Castleman Patient : 1985   MRN: [id number]  Reason for Admission: There are no discharge diagnoses documented for the most recent discharge. Discharge Date: 21       Spoke with: patient     Discharge department/facility: ED     TCM Interactive Patient Contact:  Was patient able to fill all prescriptions: Yes  Was patient instructed to bring all medications to the follow-up visit: Yes  Is patient taking all medications as directed in the discharge summary? No  Does patient understand their discharge instructions: Yes  Does patient have questions or concerns that need addressed prior to 7-14 day follow up office visit: no    Patient is scheduled with GI at this time.      Follow Up  Future Appointments   Date Time Provider Lisa iDng   6/3/2021  8:45 AM John Adame MD [address] [place] [facility]   2021 10:15 AM [facility]ED[facility] [place]   2021 11:00 AM BHAVYA, MED ONC NURSE BH[facility]YA [facility] ON[facility] [place]   2021 11:15 AM Jovon Graham MD [address] [facility], [address] [facility]

## 2021-06-03 ENCOUNTER — PROCEDURE VISIT (OUTPATIENT)
Dept: CARDIOLOGY CLINIC | Age: 36
End: 2021-06-03
Payer: COMMERCIAL

## 2021-06-03 DIAGNOSIS — R00.2 HEART PALPITATIONS: ICD-10-CM

## 2021-06-03 LAB
LV EF: 58 %
LVEF MODALITY: NORMAL

## 2021-06-03 PROCEDURE — 93306 TTE W/DOPPLER COMPLETE: CPT | Performed by: INTERNAL MEDICINE

## 2021-06-08 ENCOUNTER — HOSPITAL ENCOUNTER (OUTPATIENT)
Age: 36
Discharge: HOME | End: 2021-06-08
Payer: COMMERCIAL

## 2021-06-08 LAB
ALBUMIN SERPL-MCNC: 4.5 GM/DL (ref 3.4–5)
ALP BLD-CCNC: 60 IU/L (ref 40–129)
ALT SERPL-CCNC: 19 U/L (ref 10–40)
AMYLASE: 64 U/L (ref 25–115)
ANION GAP SERPL CALCULATED.3IONS-SCNC: 3 MMOL/L (ref 4–16)
AST SERPL-CCNC: 18 IU/L (ref 15–37)
BASOPHILS ABSOLUTE: 0 K/CU MM
BASOPHILS RELATIVE PERCENT: 0.6 % (ref 0–1)
BILIRUB SERPL-MCNC: 0.3 MG/DL (ref 0–1)
BUN BLDV-MCNC: 7 MG/DL (ref 6–23)
CALCIUM SERPL-MCNC: 8.8 MG/DL (ref 8.3–10.6)
CHLORIDE BLD-SCNC: 103 MMOL/L (ref 99–110)
CO2: 33 MMOL/L (ref 21–32)
CREAT SERPL-MCNC: 0.6 MG/DL (ref 0.6–1.1)
DIFFERENTIAL TYPE: ABNORMAL
EOSINOPHILS ABSOLUTE: 0.5 K/CU MM
EOSINOPHILS RELATIVE PERCENT: 6.7 % (ref 0–3)
GFR AFRICAN AMERICAN: >60 ML/MIN/1.73M2
GFR NON-AFRICAN AMERICAN: >60 ML/MIN/1.73M2
GLUCOSE FASTING: 50 MG/DL (ref 70–99)
HCT VFR BLD CALC: 38.3 % (ref 37–47)
HEMOGLOBIN: 12.5 GM/DL (ref 12.5–16)
IMMATURE NEUTROPHIL %: 0.6 % (ref 0–0.43)
LIPASE: 26 IU/L (ref 13–60)
LYMPHOCYTES ABSOLUTE: 2.5 K/CU MM
LYMPHOCYTES RELATIVE PERCENT: 36.5 % (ref 24–44)
MCH RBC QN AUTO: 28.9 PG (ref 27–31)
MCHC RBC AUTO-ENTMCNC: 32.6 % (ref 32–36)
MCV RBC AUTO: 88.5 FL (ref 78–100)
MONOCYTES ABSOLUTE: 0.5 K/CU MM
MONOCYTES RELATIVE PERCENT: 7.3 % (ref 0–4)
PDW BLD-RTO: 13.2 % (ref 11.7–14.9)
PLATELET # BLD: 220 K/CU MM (ref 140–440)
PMV BLD AUTO: 10.9 FL (ref 7.5–11.1)
POTASSIUM SERPL-SCNC: 4.1 MMOL/L (ref 3.5–5.1)
RBC # BLD: 4.33 M/CU MM (ref 4.2–5.4)
SEGMENTED NEUTROPHILS ABSOLUTE COUNT: 3.3 K/CU MM
SEGMENTED NEUTROPHILS RELATIVE PERCENT: 48.3 % (ref 36–66)
SODIUM BLD-SCNC: 139 MMOL/L (ref 135–145)
TOTAL IMMATURE NEUTOROPHIL: 0.04 K/CU MM
TOTAL PROTEIN: 6.7 GM/DL (ref 6.4–8.2)
WBC # BLD: 6.9 K/CU MM (ref 4–10.5)

## 2021-06-08 PROCEDURE — 36415 COLL VENOUS BLD VENIPUNCTURE: CPT

## 2021-06-08 PROCEDURE — 82150 ASSAY OF AMYLASE: CPT

## 2021-06-08 PROCEDURE — 80053 COMPREHEN METABOLIC PANEL: CPT

## 2021-06-08 PROCEDURE — 85025 COMPLETE CBC W/AUTO DIFF WBC: CPT

## 2021-06-08 PROCEDURE — 83690 ASSAY OF LIPASE: CPT

## 2021-09-14 DIAGNOSIS — K90.9 INTESTINAL MALABSORPTION, UNSPECIFIED TYPE: Primary | ICD-10-CM

## 2021-10-21 ENCOUNTER — HOSPITAL ENCOUNTER (OUTPATIENT)
Dept: INFUSION THERAPY | Age: 36
Discharge: HOME | End: 2021-10-21
Payer: COMMERCIAL

## 2021-10-21 DIAGNOSIS — K90.9 INTESTINAL MALABSORPTION, UNSPECIFIED TYPE: ICD-10-CM

## 2021-10-21 LAB
ALBUMIN SERPL-MCNC: 4.5 GM/DL (ref 3.4–5)
ALP BLD-CCNC: 56 IU/L (ref 40–129)
ALT SERPL-CCNC: 18 U/L (ref 10–40)
ANION GAP SERPL CALCULATED.3IONS-SCNC: 9 MMOL/L (ref 4–16)
AST SERPL-CCNC: 16 IU/L (ref 15–37)
BASOPHILS ABSOLUTE: 0 K/CU MM
BASOPHILS RELATIVE PERCENT: 0.3 % (ref 0–1)
BILIRUB SERPL-MCNC: 0.2 MG/DL (ref 0–1)
BUN BLDV-MCNC: 13 MG/DL (ref 6–23)
CALCIUM SERPL-MCNC: 8.7 MG/DL (ref 8.3–10.6)
CHLORIDE BLD-SCNC: 107 MMOL/L (ref 99–110)
CO2: 24 MMOL/L (ref 21–32)
CREAT SERPL-MCNC: 0.4 MG/DL (ref 0.6–1.1)
DIFFERENTIAL TYPE: ABNORMAL
EOSINOPHILS ABSOLUTE: 0.2 K/CU MM
EOSINOPHILS RELATIVE PERCENT: 3.3 % (ref 0–3)
FERRITIN: 378 NG/ML (ref 15–150)
GFR AFRICAN AMERICAN: >60 ML/MIN/1.73M2
GFR NON-AFRICAN AMERICAN: >60 ML/MIN/1.73M2
GLUCOSE BLD-MCNC: 93 MG/DL (ref 70–99)
HCT VFR BLD CALC: 34.1 % (ref 37–47)
HEMOGLOBIN: 11.6 GM/DL (ref 12.5–16)
IRON: 106 UG/DL (ref 37–145)
LYMPHOCYTES ABSOLUTE: 2.5 K/CU MM
LYMPHOCYTES RELATIVE PERCENT: 35.9 % (ref 24–44)
MCH RBC QN AUTO: 29.4 PG (ref 27–31)
MCHC RBC AUTO-ENTMCNC: 34 % (ref 32–36)
MCV RBC AUTO: 86.3 FL (ref 78–100)
MONOCYTES ABSOLUTE: 0.6 K/CU MM
MONOCYTES RELATIVE PERCENT: 8.6 % (ref 0–4)
PCT TRANSFERRIN: 52 % (ref 10–44)
PDW BLD-RTO: 13 % (ref 11.7–14.9)
PLATELET # BLD: 209 K/CU MM (ref 140–440)
PMV BLD AUTO: 10.9 FL (ref 7.5–11.1)
POTASSIUM SERPL-SCNC: 4.1 MMOL/L (ref 3.5–5.1)
RBC # BLD: 3.95 M/CU MM (ref 4.2–5.4)
SEGMENTED NEUTROPHILS ABSOLUTE COUNT: 3.6 K/CU MM
SEGMENTED NEUTROPHILS RELATIVE PERCENT: 51.9 % (ref 36–66)
SODIUM BLD-SCNC: 140 MMOL/L (ref 135–145)
TOTAL IRON BINDING CAPACITY: 202 UG/DL (ref 250–450)
TOTAL PROTEIN: 6.3 GM/DL (ref 6.4–8.2)
UNSATURATED IRON BINDING CAPACITY: 96 UG/DL (ref 110–370)
WBC # BLD: 7 K/CU MM (ref 4–10.5)

## 2021-10-21 PROCEDURE — 85025 COMPLETE CBC W/AUTO DIFF WBC: CPT

## 2021-10-21 PROCEDURE — 82728 ASSAY OF FERRITIN: CPT

## 2021-10-21 PROCEDURE — 83550 IRON BINDING TEST: CPT

## 2021-10-21 PROCEDURE — 83540 ASSAY OF IRON: CPT

## 2021-10-21 PROCEDURE — 36415 COLL VENOUS BLD VENIPUNCTURE: CPT

## 2021-10-21 PROCEDURE — 80053 COMPREHEN METABOLIC PANEL: CPT

## 2021-10-24 NOTE — PROGRESS NOTES
Patient Name: Nga Becerra  Patient : 1985  Patient MRN: [id number]     Primary Oncologist: Abiel Morales MD  Referring Provider: La Devries MD     Date of Service: 10/28/2021      Chief Complaint:   Chief Complaint   Patient presents with    Follow-up    Other     Pt c/o fatigue     Patient Active Problem List:     Iron deficiency anemia secondary to blood loss (chronic)     Lymphadenopathy     Malignant neoplasm of thyroid gland     HPI:   Ms. Ismael Melendez is a very pleasant 81-year-old patient with medical history significant for papillary thyroid carcinoma diagnosed on October 3, 2016, status post total thyroidectomy on 2016, initially referred to me on 2017, for evaluation of cervical lymphadenopathy. She stated that she had soft tissue neck ultrasound on 2016, for swollen neck glands. She was found to have solid, 2.8 cm inferior right thyroid lobe nodule with microcalcifications. Radiologist strongly recommends fine-needle aspiration. She underwent fine-needle aspiration on 2016, and the final pathology was consistent with papillary carcinoma. She was subsequently evaluated by Dr. Taurus Desai and she underwent total thyroidectomy on 2016. Final pathology revealed 2.5 cm encapsulated papillary carcinoma. It was low-grade tumor and margins were negative. There was no tumor capsular invasion, no lymphvascular invasion, no perineural invasion, and there was no extra thyroid extension. No lymph nodes were submitted for evaluation (pT2 pNx pMx). She was started with Levothyroxine since then and she has been followed closely by primary care physician. She started noticing palpable lymphadenopathy in the bilateral neck and inguinal area.   She underwent soft tissue neck sonogram on 2016, and she was found to have prominent bilateral cervical lymph nodes, likely benign given the short axis diameter is less than 1 cm (there is a lymph node measuring 2.7 into 0.6 into 1.2 cm in the right side of the neck, there is additional lymph node along the right side of the neck measuring 1.9 into 1.9 into 0.5 cm). (There is a lymph node measuring 1.9 into 0.4 into 0.8 cm along the left side of the neck inferiorly. Along the posterior aspect of the left side of the neck, there is a lymph node with fatty hilar measuring 2.2 into 0.7 into 2 cm). She stated that her lymph node resolved spontaneously after that and it returned again one to two weeks ago. She has some enlarged and tender lymphadenopathy in the left side of her neck especially in the posterior triangle, right side of the neck and supraclavicular area. Because of findings on the physical examination, she was subsequently referred to me for further evaluation. She is currently on Levothyroxine 100 mcg and her last TSH level was 1.23 on the blood tests done on February 8, 2017. She never had radioactive iodine therapy. Family history is significant for thyroid cancer in one of her aunts. Ms. Ekaterina Rausch had repeat sonogram of the soft tissue neck on March 14, 2017, and it showed no significant change in bilateral lymph nodes measuring less than 1 cm in thickness. The lymph nodes are not pathologically enlarged by imaging criteria. She also had pelvic sonogram on March 14, 2017, and it showed bilateral normal appearing inguinal lymph nodes which correlate to the palpable area of interest, not significantly changed since December 22, 2016. Findings are likely benign and recommend followup, if clinically indicated. Laboratory workups done on March 6, 2017, showed normal complete blood count and normal complete metabolic panel. Her thyroglobulin was 0.3, thyroglobulin antibody - less than 0.9, and TSH was 0.849. She was subsequently evaluated by Dr. Suellen Templeton and she had thyroid scan which was inconclusive.  She is also following regularly with endocrinologist at [facility]. Her endocrinologist does not recommend her to have radioiodine therapy and she has been followed closely since then. She has laboratory workups on 2018 for her my anemia and thrombocytopenia. All the workups were normal except she was found to have severe iron deficiency. BCR/ABL analysis was sent and she is negative for BCR/ABL translocations. I recognize that she has significant iron deficiency on recent blood test done on 2021. Since she has significant iron deficiency and she could not tolerate oral iron supplementation, we gave injectafer infusion on 4/15,2021. On 2021, she presented to me for follow up. I have been following her for iron deficiency anemia, relative eosinophilia and history of papillary thyroid cancer. I believe her iron deficiency anemia is due to menorrhagia. She stated that she stopped taking ferrous sulfate since it causes her severe constipation. She received iron infusion on April 15, 2021 [total 1500 mg] and she does not require any more iron infusions since then. She stated that her general sense of wellbeing is getting much improved with iron infusion. I recognize that she has normal iron status on 2021 blood test and she does not need any iron infusion at this moment. I recommend her to have repeat iron panel in 4 months. She stated that she recently changed her birth control pills and she started having menorrhagia since then. She does not have any other significant symptoms at today's visit. PAST MEDICAL HISTORY:  Past medical history significant for:  1. Papillary thyroid cancer. 2.      Migraine headaches. PAST SURGICAL HISTORY:  Past surgical history significant for:  1. Tubal ligation. 2.      Uterine suspension. 3.      Two  sections. 4.      Total thyroidectomy.      FAMILY HISTORY:  Significant for cervical cancer in her great grandmother and thyroid cancer in one of her aunts. No other family history of cancer disease. SOCIAL HISTORY:  She is a current smoker and she smokes about ten cigarettes a day for more than ten years. She denies alcohol drinking and illicit drug abuse. She was  before and she  in 2013. ALLERGIES:  She claims to have allergies to latex. Review of Systems: \"Per interval history; otherwise 10 point ROS is negative. \"  Her energy level is better, appetite, and sleep are good. She denies fever, chills, night sweats, cough, shortness of breath, chest pain, hemoptysis or palpitations. Her bowels and bladder functions are normal. She doesn't have nausea, vomiting, abdominal pain, diarrhea, constipation, dysuria, loss of appetite or  weight loss. She denies neuropathy and she doesn't have bleeding or clotting issues. She denies any pain in her body. Denies anxiety or depression today. The rest of the systems are unremarkable.       Vital Signs:  BP (!) 99/46 (Site: Right Upper Arm, Position: Sitting, Cuff Size: Medium Adult)   Pulse 60   Temp 97.3 °F (36.3 °C) (Infrared)   Resp 12   Ht 5' 1\" (1.549 m)   Wt 110 lb (49.9 kg)   SpO2 99%   BMI 20.78 kg/m²     Physical Exam:  CONSTITUTIONAL: awake, alert, cooperative, no apparent distress   EYES: pupils equal, round and reactive to light, sclera clear and conjunctiva normal  ENT: Normocephalic, without obvious abnormality, atraumatic  NECK: supple, symmetrical, no jugular venous distension and no carotid bruits   HEMATOLOGIC/LYMPHATIC: no cervical, supraclavicular or axillary lymphadenopathy   LUNGS: VBS, no wheezes, clear to auscultation, no crackles, no rhonchi, no increased work of breathing   CARDIOVASCULAR: regular rate and rhythm, normal S1 and S2, no murmur noted  ABDOMEN: normal bowel sounds x 4, soft, non-distended, non-tender, no masses palpated, no hepatosplenomegaly   MUSCULOSKELETAL: full range of motion noted, tone is normal  NEUROLOGIC: awake, alert, oriented to name, place and time. Motor skills grossly intact. SKIN: Normal skin color, texture, turgor and no jaundice.  appears intact   EXTREMITIES: no LE edema, no clubbing, no leg swelling, no cyanosis,     Labs:  Hematology:  Lab Results   Component Value Date    WBC 7.0 10/21/2021    RBC 3.95 (L) 10/21/2021    HGB 11.6 (L) 10/21/2021    HCT 34.1 (L) 10/21/2021    MCV 86.3 10/21/2021    MCH 29.4 10/21/2021    MCHC 34.0 10/21/2021    RDW 13.0 10/21/2021     10/21/2021    MPV 10.9 10/21/2021    SEGSPCT 51.9 10/21/2021    EOSRELPCT 3.3 (H) 10/21/2021    BASOPCT 0.3 10/21/2021    LYMPHOPCT 35.9 10/21/2021    MONOPCT 8.6 (H) 10/21/2021    SEGSABS 3.6 10/21/2021    EOSABS 0.2 10/21/2021    BASOSABS 0.0 10/21/2021    LYMPHSABS 2.5 10/21/2021    MONOSABS 0.6 10/21/2021    DIFFTYPE AUTOMATED DIFFERENTIAL 10/21/2021    POLYCHROM 1+ 04/02/2018    PLTM PLATELETS APPEAR NORMAL 04/02/2018     Lab Results   Component Value Date    ESR 14 04/02/2018     Chemistry:  Lab Results   Component Value Date     10/21/2021    K 4.1 10/21/2021     10/21/2021    CO2 24 10/21/2021    BUN 13 10/21/2021    CREATININE 0.4 (L) 10/21/2021    GLUCOSE 93 10/21/2021    CALCIUM 8.7 10/21/2021    PROT 6.3 (L) 10/21/2021    LABALBU 4.5 10/21/2021    BILITOT 0.2 10/21/2021    ALKPHOS 56 10/21/2021    AST 16 10/21/2021    ALT 18 10/21/2021    LABGLOM >60 10/21/2021    GFRAA >60 10/21/2021     Lab Results   Component Value Date     04/02/2018     No components found for: LD  Lab Results   Component Value Date    TSHHS 0.037 (L) 04/19/2021    T4FREE 1.52 04/19/2021    FT3 3.3 07/13/2020     Immunology:  Lab Results   Component Value Date    PROT 6.3 (L) 10/21/2021     No results found for: Gonzalo Andres, KLFLCR  No results found for: B2M  Coagulation Panel:  Lab Results   Component Value Date    PROTIME 11.2 09/29/2016    INR 0.98 09/29/2016    APTT 23.2 09/29/2016    DDIMER <200 07/14/2018     Anemia Panel:  Lab Results   Component Value Date    EQKXDXQI97 414.4 04/02/2018    FOLATE >20.0 (H) 04/02/2018     Tumor Markers:  No results found for: , CEA, , LABCA2, PSA  Observations:  PHQ-9 Total Score: 0 (10/28/2021 10:06 AM)     Assessment & Plan:   1. Cervical and inguinal lymphadenopathy. 2.      Papillary thyroid carcinoma. 3.      Normocytic normochromic anemia    PLAN:  Ms. May Penaloza has been followed for iron deficiency anemia, relative eosinophilia and history of papillary thyroid cancer. I recognize that she has significant iron deficiency on recent blood test done on March 18, 2021. Since she has significant iron deficiency and she could not tolerate oral iron supplementation, we gave injectafer infusion on 4/15,2021. On October 28, 2021, she presented to me for follow up. I have been following her for iron deficiency anemia, relative eosinophilia and history of papillary thyroid cancer. I believe her iron deficiency anemia is due to menorrhagia. She stated that she stopped taking ferrous sulfate since it causes her severe constipation. She received iron infusion on April 15, 2021 [total 1500 mg] and she does not require any more iron infusions since then. She stated that her general sense of wellbeing is getting much improved with iron infusion. I recognize that she has normal iron status on October 21, 2021 blood test and she does not need any iron infusion at this moment. I recommend her to have repeat iron panel in 4 months. She stated that she recently changed her birth control pills and she started having menorrhagia since then. I answered all her questions and concerns for today. I asked her to follow up with primary care physician Dr. Alex Manley on regular basis. Recent imaging and labs were reviewed and discussed with the patient.

## 2021-10-28 ENCOUNTER — OFFICE VISIT (OUTPATIENT)
Dept: ONCOLOGY | Age: 36
End: 2021-10-28
Payer: COMMERCIAL

## 2021-10-28 ENCOUNTER — HOSPITAL ENCOUNTER (OUTPATIENT)
Dept: INFUSION THERAPY | Age: 36
Discharge: HOME | End: 2021-10-28
Payer: COMMERCIAL

## 2021-10-28 VITALS
HEART RATE: 60 BPM | TEMPERATURE: 97.3 F | OXYGEN SATURATION: 99 % | SYSTOLIC BLOOD PRESSURE: 99 MMHG | DIASTOLIC BLOOD PRESSURE: 46 MMHG | RESPIRATION RATE: 12 BRPM | HEIGHT: 61 IN | BODY MASS INDEX: 20.77 KG/M2 | WEIGHT: 110 LBS

## 2021-10-28 DIAGNOSIS — D50.0 IRON DEFICIENCY ANEMIA SECONDARY TO BLOOD LOSS (CHRONIC): ICD-10-CM

## 2021-10-28 DIAGNOSIS — K90.9 INTESTINAL MALABSORPTION, UNSPECIFIED TYPE: Primary | ICD-10-CM

## 2021-10-28 PROCEDURE — G8420 CALC BMI NORM PARAMETERS: HCPCS | Performed by: INTERNAL MEDICINE

## 2021-10-28 PROCEDURE — 99213 OFFICE O/P EST LOW 20 MIN: CPT | Performed by: INTERNAL MEDICINE

## 2021-10-28 PROCEDURE — 99211 OFF/OP EST MAY X REQ PHY/QHP: CPT

## 2021-10-28 PROCEDURE — G8427 DOCREV CUR MEDS BY ELIG CLIN: HCPCS | Performed by: INTERNAL MEDICINE

## 2021-10-28 PROCEDURE — G8484 FLU IMMUNIZE NO ADMIN: HCPCS | Performed by: INTERNAL MEDICINE

## 2021-10-28 PROCEDURE — 4004F PT TOBACCO SCREEN RCVD TLK: CPT | Performed by: INTERNAL MEDICINE

## 2021-10-28 RX ORDER — DROSPIRENONE 4 MG/1
4 TABLET, FILM COATED ORAL DAILY
COMMUNITY
Start: 2021-10-24

## 2021-10-28 ASSESSMENT — PATIENT HEALTH QUESTIONNAIRE - PHQ9
SUM OF ALL RESPONSES TO PHQ9 QUESTIONS 1 & 2: 0
SUM OF ALL RESPONSES TO PHQ QUESTIONS 1-9: 0
SUM OF ALL RESPONSES TO PHQ QUESTIONS 1-9: 0
2. FEELING DOWN, DEPRESSED OR HOPELESS: 0
1. LITTLE INTEREST OR PLEASURE IN DOING THINGS: 0
SUM OF ALL RESPONSES TO PHQ QUESTIONS 1-9: 0

## 2021-10-28 NOTE — PROGRESS NOTES
MA Rooming Questions  Patient: Patrica Alvarado  MRN: [id number]    Date: 10/28/2021        1. Do you have any new issues? yes - Pt c/o fatigue         2. Do you need any refills on medications?    no    3. Have you had any imaging done since your last visit?   no    4. Have you been hospitalized or seen in the emergency room since your last visit here?   no    5. Did the patient have a depression screening completed today?  Yes    PHQ-9 Total Score: 0 (10/28/2021 10:06 AM)       PHQ-9 Given to (if applicable):               PHQ-9 Score (if applicable):                     [] Positive     [x]  Negative              Does question #9 need addressed (if applicable)                     [] Yes    []  No               Armando Blancas MA

## 2021-12-06 LAB — MAMMOGRAPHY, EXTERNAL: NORMAL

## 2022-01-08 ENCOUNTER — HOSPITAL ENCOUNTER (EMERGENCY)
Age: 37
Discharge: HOME | End: 2022-01-08
Attending: EMERGENCY MEDICINE
Payer: COMMERCIAL

## 2022-01-08 ENCOUNTER — APPOINTMENT (OUTPATIENT)
Dept: GENERAL RADIOLOGY | Age: 37
End: 2022-01-08
Payer: COMMERCIAL

## 2022-01-08 VITALS
RESPIRATION RATE: 16 BRPM | BODY MASS INDEX: 20.01 KG/M2 | DIASTOLIC BLOOD PRESSURE: 62 MMHG | WEIGHT: 106 LBS | SYSTOLIC BLOOD PRESSURE: 124 MMHG | OXYGEN SATURATION: 100 % | HEART RATE: 74 BPM | TEMPERATURE: 98.4 F | HEIGHT: 61 IN

## 2022-01-08 DIAGNOSIS — R06.02 SHORTNESS OF BREATH: Primary | ICD-10-CM

## 2022-01-08 LAB
ALBUMIN SERPL-MCNC: 4 GM/DL (ref 3.4–5)
ALP BLD-CCNC: 56 IU/L (ref 40–129)
ALT SERPL-CCNC: 18 U/L (ref 10–40)
ANION GAP SERPL CALCULATED.3IONS-SCNC: 10 MMOL/L (ref 4–16)
AST SERPL-CCNC: 15 IU/L (ref 15–37)
BASOPHILS ABSOLUTE: 0.1 K/CU MM
BASOPHILS RELATIVE PERCENT: 0.6 % (ref 0–1)
BILIRUB SERPL-MCNC: 0.4 MG/DL (ref 0–1)
BUN BLDV-MCNC: 9 MG/DL (ref 6–23)
CALCIUM SERPL-MCNC: 8.7 MG/DL (ref 8.3–10.6)
CHLORIDE BLD-SCNC: 105 MMOL/L (ref 99–110)
CO2: 22 MMOL/L (ref 21–32)
CREAT SERPL-MCNC: 0.5 MG/DL (ref 0.6–1.1)
DIFFERENTIAL TYPE: ABNORMAL
EOSINOPHILS ABSOLUTE: 0.4 K/CU MM
EOSINOPHILS RELATIVE PERCENT: 4.8 % (ref 0–3)
GFR AFRICAN AMERICAN: >60 ML/MIN/1.73M2
GFR NON-AFRICAN AMERICAN: >60 ML/MIN/1.73M2
GLUCOSE BLD-MCNC: 95 MG/DL (ref 70–99)
HCT VFR BLD CALC: 38 % (ref 37–47)
HEMOGLOBIN: 12.8 GM/DL (ref 12.5–16)
IMMATURE NEUTROPHIL %: 0.5 % (ref 0–0.43)
LYMPHOCYTES ABSOLUTE: 2.2 K/CU MM
LYMPHOCYTES RELATIVE PERCENT: 27.1 % (ref 24–44)
MCH RBC QN AUTO: 28.8 PG (ref 27–31)
MCHC RBC AUTO-ENTMCNC: 33.7 % (ref 32–36)
MCV RBC AUTO: 85.4 FL (ref 78–100)
MONOCYTES ABSOLUTE: 0.7 K/CU MM
MONOCYTES RELATIVE PERCENT: 8.6 % (ref 0–4)
PDW BLD-RTO: 12.4 % (ref 11.7–14.9)
PLATELET # BLD: 201 K/CU MM (ref 140–440)
PMV BLD AUTO: 11.2 FL (ref 7.5–11.1)
POTASSIUM SERPL-SCNC: 3.9 MMOL/L (ref 3.5–5.1)
RBC # BLD: 4.45 M/CU MM (ref 4.2–5.4)
SEGMENTED NEUTROPHILS ABSOLUTE COUNT: 4.7 K/CU MM
SEGMENTED NEUTROPHILS RELATIVE PERCENT: 58.4 % (ref 36–66)
SODIUM BLD-SCNC: 137 MMOL/L (ref 135–145)
TOTAL IMMATURE NEUTOROPHIL: 0.04 K/CU MM
TOTAL PROTEIN: 6.4 GM/DL (ref 6.4–8.2)
WBC # BLD: 8.1 K/CU MM (ref 4–10.5)

## 2022-01-08 PROCEDURE — 71046 X-RAY EXAM CHEST 2 VIEWS: CPT

## 2022-01-08 PROCEDURE — 80053 COMPREHEN METABOLIC PANEL: CPT

## 2022-01-08 PROCEDURE — 99283 EMERGENCY DEPT VISIT LOW MDM: CPT

## 2022-01-08 PROCEDURE — 85025 COMPLETE CBC W/AUTO DIFF WBC: CPT

## 2022-01-08 RX ORDER — ALBUTEROL SULFATE 90 UG/1
2 AEROSOL, METERED RESPIRATORY (INHALATION) 4 TIMES DAILY PRN
Qty: 18 G | Refills: 0 | Status: SHIPPED | OUTPATIENT
Start: 2022-01-08

## 2022-01-08 NOTE — Clinical Note
Jaime Nichols was seen and treated in our emergency department on 1/8/2022. She may return to work on 01/10/2022. If you have any questions or concerns, please don't hesitate to call.       Ward Duran MD

## 2022-01-08 NOTE — ED PROVIDER NOTES
Emergency [facility] DEPARTMENT    Patient: Angelica Nicholson  MRN: [id number]  : 1985  Date of Evaluation: 2022  ED Provider: Kalyan Juárez MD    Chief Complaint       Chief Complaint   Patient presents with    Shortness of Breath     x 10 days no other sx     Abebe Zayas is a 29 y.o. female who presents to the emergency department this is a 81-year-old female brought to the emergency department for evaluation of subjective shortness of breath. Patient reports being in usual state of health until approximately 10 days ago when symptoms first started. No reported fevers or sick contacts no recent travel no changes of diet or medications no swelling of legs or feet no history of DVTs or PEs. Patient says that she quit smoking 3 months ago. She states she woke up this morning feeling short of breath and she got very concerned. She says that at home her pulse ox remained between 98 to 100% on room air. She is concerned because she does have a history of thyroid cancer and wants to make sure that she does not have any evidence of cancer in her lungs. She is not currently undergoing chemotherapy or radiation therapy or treatment for this cancer. ROS:     At least 10 systems reviewed and otherwise acutely negative except as in the [address] Past History     Past Medical History:   Diagnosis Date    Cancer [facility])     path report from thyroid bx- + papillary carcinoma 2016- tx with radioactive iodine and surgery\"- follows with Dr Schmidt Spearing Enlarged thyroid     for bx 2016    H/O echocardiogram 2021    Left ventricular function and size is normal, EF is estimated at 55-60%.  Mild mitral and tricuspid regurgitation is present    Heart abnormality     heart murmur    Kidney stone     \"use to see Dr Ruben Martines, last kidney stone over a year ago, started approx 4 yrs ago- passed some in past\"    Murmur, heart     \"told I had this with pregnancy in 2008, no echo, do have occ palpitations when lay on right side\"    PONV (postoperative nausea and vomiting)     denies any motion sickness     Past Surgical History:   Procedure Laterality Date    ABDOMEN SURGERY      uterine suspension, 2008     SECTION      CHOLECYSTECTOMY      CYSTOSCOPY  2012    LAPAROSCOPY      x5- last one 2009    MYRINGOTOMY AND TYMPANOSTOMY TUBE PLACEMENT      as a child    THYROIDECTOMY  10/17/2016    total thyroidectomy     TUBAL LIGATION  2016    UTERINE SUSPENSION  2014     Social History     Socioeconomic History    Marital status:      Spouse name: None    Number of children: None    Years of education: None    Highest education level: None   Occupational History    None   Tobacco Use    Smoking status: Current Every Day Smoker     Packs/day: 0.50     Years: 12.00     Pack years: 6.00     Types: Cigarettes    Smokeless tobacco: Never Used    Tobacco comment: \"trying to quit\"   Vaping Use    Vaping Use: Some days    Substances: Never, THC   Substance and Sexual Activity    Alcohol use: No    Drug use: Yes     Types: Marijuana [place] [facility])     Comment: medical marijuana    Sexual activity: Yes     Partners: Male   Other Topics Concern    None   Social History Narrative    None     Social Determinants of Health     Financial Resource Strain:     Difficulty of Paying Living Expenses: Not on file   Food Insecurity:     Worried About Running Out of Food in the Last Year: Not on file    Brooke of Food in the Last Year: Not on file   Transportation Needs:     Lack of Transportation (Medical): Not on file    Lack of Transportation (Non-Medical):  Not on file   Physical Activity:     Days of Exercise per Week: Not on file    Minutes of Exercise per Session: Not on file   Stress:     Feeling of Stress : Not on file   Social Connections:     Frequency of Communication with Friends and Family: Not on file    Frequency of Social Gatherings with Friends and Family: Not on file    Attends Anglican Services: Not on file    Active Member of Clubs or Organizations: Not on file    Attends Club or Organization Meetings: Not on file    Marital Status: Not on file   Intimate Partner Violence:     Fear of Current or Ex-Partner: Not on file    Emotionally Abused: Not on file    Physically Abused: Not on file    Sexually Abused: Not on file   Housing Stability:     Unable to Pay for Housing in the Last Year: Not on file    Number of [place] in the Last Year: Not on file    Unstable Housing in the Last Year: Not on file       Medications/Allergies     Previous Medications    DIPHENHYDRAMINE (BENADRYL) 25 MG TABLET    Take 100 mg by mouth    LEVOTHYROXINE (SYNTHROID) 100 MCG TABLET    Take 100 mcg by mouth Daily     LIOTHYRONINE (CYTOMEL) 5 MCG TABLET    Take 5 mcg by mouth 2 times daily 5 mcg in the am and 2.5mcg in pm    MEDICAL MARIJUANA    Take by mouth as needed. MELATONIN 10 MG CAPS CAPSULE    Take 30 mg by mouth nightly    SLYND 4 MG TABS    Take 4 mg by mouth daily     Allergies   Allergen Reactions    Latex Swelling and Rash     Swelling and rash    Ciprofloxacin      Right arm went numb; heart rate racing (was give cipro/flagyl combo at hospital)        Physical Exam       ED Triage Vitals [01/08/22 0807]   BP Temp Temp Source Pulse Resp SpO2 Height Weight   124/62 98.4 °F (36.9 °C) Oral 74 16 100 % 5' 1\" (1.549 m) 106 lb (48.1 kg)     GENERAL APPEARANCE: Awake and alert. Cooperative. No acute distress. HEAD: Normocephalic. Atraumatic. EYES: Sclera anicteric. ENT: Tolerates saliva. No trismus. NECK: Supple. Trachea midline. CARDIO: RRR. Radial pulse 2+. LUNGS: Respirations unlabored. CTAB. ABDOMEN: Soft. Non-distended. Non-tender. EXTREMITIES: No acute deformities. SKIN: Warm and dry. NEUROLOGICAL: No gross facial drooping. Moves all 4 extremities spontaneously. PSYCHIATRIC: Normal mood.      Diagnostics Labs:  Results for orders placed or performed during the hospital encounter of 01/08/22   CBC Auto Differential   Result Value Ref Range    WBC 8.1 4.0 - 10.5 K/CU MM    RBC 4.45 4.2 - 5.4 M/CU MM    Hemoglobin 12.8 12.5 - 16.0 GM/DL    Hematocrit 38.0 37 - 47 %    MCV 85.4 78 - 100 FL    MCH 28.8 27 - 31 PG    MCHC 33.7 32.0 - 36.0 %    RDW 12.4 11.7 - 14.9 %    Platelets 713 817 - 487 K/CU MM    MPV 11.2 (H) 7.5 - 11.1 FL    Differential Type AUTOMATED DIFFERENTIAL     Segs Relative 58.4 36 - 66 %    Lymphocytes % 27.1 24 - 44 %    Monocytes % 8.6 (H) 0 - 4 %    Eosinophils % 4.8 (H) 0 - 3 %    Basophils % 0.6 0 - 1 %    Segs Absolute 4.7 K/CU MM    Lymphocytes Absolute 2.2 K/CU MM    Monocytes Absolute 0.7 K/CU MM    Eosinophils Absolute 0.4 K/CU MM    Basophils Absolute 0.1 K/CU MM    Immature Neutrophil % 0.5 (H) 0 - 0.43 %    Total Immature Neutrophil 0.04 K/CU MM   Comprehensive Metabolic Panel w/ Reflex to MG   Result Value Ref Range    Sodium 137 135 - 145 MMOL/L    Potassium 3.9 3.5 - 5.1 MMOL/L    Chloride 105 99 - 110 mMol/L    CO2 22 21 - 32 MMOL/L    BUN 9 6 - 23 MG/DL    CREATININE 0.5 (L) 0.6 - 1.1 MG/DL    Glucose 95 70 - 99 MG/DL    Calcium 8.7 8.3 - 10.6 MG/DL    Albumin 4.0 3.4 - 5.0 GM/DL    Total Protein 6.4 6.4 - 8.2 GM/DL    Total Bilirubin 0.4 0.0 - 1.0 MG/DL    ALT 18 10 - 40 U/L    AST 15 15 - 37 IU/L    Alkaline Phosphatase 56 40 - 129 IU/L    GFR Non-African American >60 >60 mL/min/1.73m2    GFR African American >60 >60 mL/min/1.73m2    Anion Gap 10 4 - 16     Radiographs:  XR CHEST (2 VW)    Result Date: 1/8/2022  EXAMINATION: TWO XRAY VIEWS OF THE CHEST 1/8/2022 8:24 am COMPARISON: Chest radiograph 2018. HISTORY: ORDERING SYSTEM PROVIDED HISTORY: dyspnea TECHNOLOGIST PROVIDED HISTORY: Reason for exam:->dyspnea Reason for exam:->no fever FINDINGS: Two views provided. The mediastinal, cardiac, and hilar silhouettes are normal.  Normal lung volumes.   No acute consolidation or

## 2022-01-23 ENCOUNTER — HOSPITAL ENCOUNTER (EMERGENCY)
Age: 37
Discharge: LEFT BEFORE DISCHARGE | End: 2022-01-23
Attending: EMERGENCY MEDICINE

## 2022-01-23 VITALS
BODY MASS INDEX: 18.88 KG/M2 | HEART RATE: 72 BPM | RESPIRATION RATE: 18 BRPM | HEIGHT: 61 IN | TEMPERATURE: 99 F | DIASTOLIC BLOOD PRESSURE: 61 MMHG | SYSTOLIC BLOOD PRESSURE: 118 MMHG | WEIGHT: 100 LBS | OXYGEN SATURATION: 98 %

## 2022-01-23 ASSESSMENT — PAIN DESCRIPTION - DESCRIPTORS: DESCRIPTORS: CRAMPING

## 2022-01-23 ASSESSMENT — PAIN DESCRIPTION - PAIN TYPE: TYPE: ACUTE PAIN

## 2022-01-23 ASSESSMENT — PAIN SCALES - GENERAL: PAINLEVEL_OUTOF10: 3

## 2022-01-23 ASSESSMENT — PAIN DESCRIPTION - LOCATION: LOCATION: ABDOMEN

## 2022-03-28 ENCOUNTER — HOSPITAL ENCOUNTER (EMERGENCY)
Age: 37
Discharge: HOME | End: 2022-03-28
Attending: EMERGENCY MEDICINE
Payer: COMMERCIAL

## 2022-03-28 VITALS
HEART RATE: 53 BPM | WEIGHT: 112 LBS | HEIGHT: 61 IN | TEMPERATURE: 98.8 F | OXYGEN SATURATION: 99 % | BODY MASS INDEX: 21.14 KG/M2 | SYSTOLIC BLOOD PRESSURE: 125 MMHG | DIASTOLIC BLOOD PRESSURE: 56 MMHG | RESPIRATION RATE: 16 BRPM

## 2022-03-28 DIAGNOSIS — R11.2 NAUSEA AND VOMITING IN ADULT PATIENT: Primary | ICD-10-CM

## 2022-03-28 LAB
ALBUMIN SERPL-MCNC: 4.3 GM/DL (ref 3.4–5)
ALP BLD-CCNC: 45 IU/L (ref 40–129)
ALT SERPL-CCNC: 12 U/L (ref 10–40)
ANION GAP SERPL CALCULATED.3IONS-SCNC: 11 MMOL/L (ref 4–16)
AST SERPL-CCNC: 15 IU/L (ref 15–37)
BACTERIA: ABNORMAL /HPF
BASOPHILS ABSOLUTE: 0 K/CU MM
BASOPHILS RELATIVE PERCENT: 0.3 % (ref 0–1)
BILIRUB SERPL-MCNC: 0.5 MG/DL (ref 0–1)
BILIRUBIN URINE: ABNORMAL MG/DL
BLOOD, URINE: ABNORMAL
BUN BLDV-MCNC: 10 MG/DL (ref 6–23)
CALCIUM SERPL-MCNC: 9 MG/DL (ref 8.3–10.6)
CAST TYPE: NEGATIVE /HPF
CHLORIDE BLD-SCNC: 100 MMOL/L (ref 99–110)
CLARITY: ABNORMAL
CO2: 24 MMOL/L (ref 21–32)
COLOR: YELLOW
CREAT SERPL-MCNC: 0.6 MG/DL (ref 0.6–1.1)
CRYSTAL TYPE: NEGATIVE /HPF
DIFFERENTIAL TYPE: ABNORMAL
EOSINOPHILS ABSOLUTE: 0.1 K/CU MM
EOSINOPHILS RELATIVE PERCENT: 1.3 % (ref 0–3)
EPITHELIAL CELLS, UA: ABNORMAL /HPF
GFR AFRICAN AMERICAN: >60 ML/MIN/1.73M2
GFR NON-AFRICAN AMERICAN: >60 ML/MIN/1.73M2
GLUCOSE BLD-MCNC: 99 MG/DL (ref 70–99)
GLUCOSE, URINE: NEGATIVE MG/DL
HCT VFR BLD CALC: 36.8 % (ref 37–47)
HEMOGLOBIN: 12.1 GM/DL (ref 12.5–16)
IMMATURE NEUTROPHIL %: 0.3 % (ref 0–0.43)
INTERPRETATION: NORMAL
KETONES, URINE: 40 MG/DL
LEUKOCYTE ESTERASE, URINE: NEGATIVE
LIPASE: 18 IU/L (ref 13–60)
LYMPHOCYTES ABSOLUTE: 2.2 K/CU MM
LYMPHOCYTES RELATIVE PERCENT: 23.7 % (ref 24–44)
MCH RBC QN AUTO: 28.5 PG (ref 27–31)
MCHC RBC AUTO-ENTMCNC: 32.9 % (ref 32–36)
MCV RBC AUTO: 86.8 FL (ref 78–100)
MONOCYTES ABSOLUTE: 0.6 K/CU MM
MONOCYTES RELATIVE PERCENT: 6.3 % (ref 0–4)
NITRITE URINE, QUANTITATIVE: NEGATIVE
PDW BLD-RTO: 13.1 % (ref 11.7–14.9)
PH, URINE: 6.5 (ref 5–8)
PLATELET # BLD: 210 K/CU MM (ref 140–440)
PMV BLD AUTO: 10.9 FL (ref 7.5–11.1)
POTASSIUM SERPL-SCNC: 3.9 MMOL/L (ref 3.5–5.1)
PREGNANCY, URINE: NEGATIVE
PROTEIN UA: NEGATIVE MG/DL
RBC # BLD: 4.24 M/CU MM (ref 4.2–5.4)
RBC URINE: ABNORMAL /HPF (ref 0–6)
SEGMENTED NEUTROPHILS ABSOLUTE COUNT: 6.3 K/CU MM
SEGMENTED NEUTROPHILS RELATIVE PERCENT: 68.1 % (ref 36–66)
SODIUM BLD-SCNC: 135 MMOL/L (ref 135–145)
SPECIFIC GRAVITY UA: 1.02 (ref 1–1.03)
SPECIFIC GRAVITY, URINE: 1.02 (ref 1–1.03)
TOTAL IMMATURE NEUTOROPHIL: 0.03 K/CU MM
TOTAL PROTEIN: 6.3 GM/DL (ref 6.4–8.2)
UROBILINOGEN, URINE: 0.2 MG/DL (ref 0.2–1)
WBC # BLD: 9.2 K/CU MM (ref 4–10.5)
WBC UA: NEGATIVE /HPF (ref 0–5)

## 2022-03-28 PROCEDURE — 99283 EMERGENCY DEPT VISIT LOW MDM: CPT

## 2022-03-28 PROCEDURE — 81025 URINE PREGNANCY TEST: CPT

## 2022-03-28 PROCEDURE — 80053 COMPREHEN METABOLIC PANEL: CPT

## 2022-03-28 PROCEDURE — 6370000000 HC RX 637 (ALT 250 FOR IP): Performed by: EMERGENCY MEDICINE

## 2022-03-28 PROCEDURE — 96374 THER/PROPH/DIAG INJ IV PUSH: CPT

## 2022-03-28 PROCEDURE — 81001 URINALYSIS AUTO W/SCOPE: CPT

## 2022-03-28 PROCEDURE — 85025 COMPLETE CBC W/AUTO DIFF WBC: CPT

## 2022-03-28 PROCEDURE — 2580000003 HC RX 258: Performed by: EMERGENCY MEDICINE

## 2022-03-28 PROCEDURE — 96375 TX/PRO/DX INJ NEW DRUG ADDON: CPT

## 2022-03-28 PROCEDURE — 6360000002 HC RX W HCPCS: Performed by: EMERGENCY MEDICINE

## 2022-03-28 PROCEDURE — 83690 ASSAY OF LIPASE: CPT

## 2022-03-28 RX ORDER — ONDANSETRON 2 MG/ML
4 INJECTION INTRAMUSCULAR; INTRAVENOUS ONCE
Status: COMPLETED | OUTPATIENT
Start: 2022-03-28 | End: 2022-03-28

## 2022-03-28 RX ORDER — MORPHINE SULFATE 4 MG/ML
4 INJECTION, SOLUTION INTRAMUSCULAR; INTRAVENOUS ONCE
Status: COMPLETED | OUTPATIENT
Start: 2022-03-28 | End: 2022-03-28

## 2022-03-28 RX ORDER — ONDANSETRON 4 MG/1
4 TABLET, ORALLY DISINTEGRATING ORAL EVERY 8 HOURS PRN
Qty: 15 TABLET | Refills: 0 | Status: SHIPPED | OUTPATIENT
Start: 2022-03-28

## 2022-03-28 RX ORDER — 0.9 % SODIUM CHLORIDE 0.9 %
1000 INTRAVENOUS SOLUTION INTRAVENOUS ONCE
Status: COMPLETED | OUTPATIENT
Start: 2022-03-28 | End: 2022-03-28

## 2022-03-28 RX ORDER — PROMETHAZINE HYDROCHLORIDE 25 MG/1
25 TABLET ORAL ONCE
Status: COMPLETED | OUTPATIENT
Start: 2022-03-28 | End: 2022-03-28

## 2022-03-28 RX ADMIN — PROMETHAZINE HYDROCHLORIDE 25 MG: 25 TABLET ORAL at 20:58

## 2022-03-28 RX ADMIN — MORPHINE SULFATE 4 MG: 4 INJECTION INTRAVENOUS at 19:23

## 2022-03-28 RX ADMIN — SODIUM CHLORIDE 1000 ML: 9 INJECTION, SOLUTION INTRAVENOUS at 19:23

## 2022-03-28 RX ADMIN — ONDANSETRON 4 MG: 2 INJECTION INTRAMUSCULAR; INTRAVENOUS at 19:23

## 2022-03-28 ASSESSMENT — ENCOUNTER SYMPTOMS
NAUSEA: 1
EYES NEGATIVE: 1
SORE THROAT: 0
RESPIRATORY NEGATIVE: 1
COUGH: 0
ABDOMINAL PAIN: 1
DIARRHEA: 0

## 2022-03-28 ASSESSMENT — PAIN - FUNCTIONAL ASSESSMENT: PAIN_FUNCTIONAL_ASSESSMENT: 0-10

## 2022-03-28 ASSESSMENT — PAIN DESCRIPTION - PAIN TYPE: TYPE: ACUTE PAIN

## 2022-03-28 ASSESSMENT — PAIN SCALES - GENERAL: PAINLEVEL_OUTOF10: 8

## 2022-03-28 ASSESSMENT — PAIN DESCRIPTION - ORIENTATION: ORIENTATION: RIGHT;LEFT;UPPER

## 2022-03-28 ASSESSMENT — PAIN DESCRIPTION - LOCATION: LOCATION: ABDOMEN

## 2022-03-28 ASSESSMENT — PAIN DESCRIPTION - FREQUENCY: FREQUENCY: INTERMITTENT

## 2022-03-28 NOTE — ED PROVIDER NOTES
The history is provided by the patient. Nausea & Vomiting  Severity:  Moderate  Duration:  1 day  Timing:  Intermittent  Quality:  Stomach contents  Able to tolerate:  Liquids  Progression:  Partially resolved  Chronicity:  New  Recent urination:  Decreased  Relieved by:  Nothing  Worsened by:  Food smell  Ineffective treatments:  None tried  Associated symptoms: abdominal pain    Associated symptoms: no arthralgias, no chills, no cough, no diarrhea, no fever, no headaches, no myalgias, no sore throat and no URI    Abdominal pain:     Location:  Generalized    Quality: burning and cramping      Severity:  Moderate    Timing:  Intermittent    Progression:  Waxing and waning    Chronicity:  New  Risk factors: no sick contacts, no suspect food intake and no travel to endemic areas    Risk factors comment:  History of Bacterial colitis       Review of Systems   Constitutional: Negative. Negative for chills and fever. HENT: Negative. Negative for sore throat. Eyes: Negative. Respiratory: Negative. Negative for cough. Cardiovascular: Negative. Gastrointestinal: Positive for abdominal pain and nausea. Negative for diarrhea. Genitourinary: Negative. Musculoskeletal: Negative. Negative for arthralgias and myalgias. Skin: Negative. Neurological: Negative. Negative for headaches. All other systems reviewed and are negative.       Family History   Problem Relation Age of Onset    Arthritis Mother     High Blood Pressure Mother     Other Mother         lupus & fibromyalgia    Ovarian Cancer Mother     Arthritis Father     Mental Retardation Paternal Uncle     Arthritis Maternal Grandmother     Diabetes Maternal Grandmother     High Blood Pressure Maternal Grandmother     Other Maternal Grandmother         fibromyalgia    Ovarian Cancer Maternal Grandmother     Arthritis Maternal Grandfather     Diabetes Maternal Grandfather     High Blood Pressure Maternal Grandfather     Arthritis Paternal Grandmother     Heart Attack Paternal Grandmother     Arthritis Paternal Grandfather     Heart Attack Paternal Grandfather      Social History     Socioeconomic History    Marital status:      Spouse name: Not on file    Number of children: Not on file    Years of education: Not on file    Highest education level: Not on file   Occupational History    Not on file   Tobacco Use    Smoking status: Former Smoker     Packs/day: 0.50     Years: 12.00     Pack years: 6.00     Types: Cigarettes    Smokeless tobacco: Never Used    Tobacco comment: \"trying to quit\"   Vaping Use    Vaping Use: Some days    Substances: Never, THC   Substance and Sexual Activity    Alcohol use: No    Drug use: Yes     Types: Marijuana Armen Don)     Comment: medical marijuana    Sexual activity: Yes     Partners: Male   Other Topics Concern    Not on file   Social History Narrative    Not on file     Social Determinants of Health     Financial Resource Strain:     Difficulty of Paying Living Expenses: Not on file   Food Insecurity:     Worried About Running Out of Food in the Last Year: Not on file    Brooke of Food in the Last Year: Not on file   Transportation Needs:     Lack of Transportation (Medical): Not on file    Lack of Transportation (Non-Medical):  Not on file   Physical Activity:     Days of Exercise per Week: Not on file    Minutes of Exercise per Session: Not on file   Stress:     Feeling of Stress : Not on file   Social Connections:     Frequency of Communication with Friends and Family: Not on file    Frequency of Social Gatherings with Friends and Family: Not on file    Attends Mu-ism Services: Not on file    Active Member of Clubs or Organizations: Not on file    Attends Club or Organization Meetings: Not on file    Marital Status: Not on file   Intimate Partner Violence:     Fear of Current or Ex-Partner: Not on file    Emotionally Abused: Not on file    Physically Abused: Not on file    Sexually Abused: Not on file   Housing Stability:     Unable to Pay for Housing in the Last Year: Not on file    Number of Places Lived in the Last Year: Not on file    Unstable Housing in the Last Year: Not on file     Past Surgical History:   Procedure Laterality Date    ABDOMEN SURGERY      uterine suspension, 2008   [address]  2009/2014    CHOLECYSTECTOMY      CYSTOSCOPY  2012    LAPAROSCOPY      x5- last one 2009   [address]      as a child    THYROIDECTOMY  10/17/2016    total thyroidectomy     TUBAL LIGATION  2016   [place] UTERINE SUSPENSION  2014     Past Medical History:   Diagnosis Date    Cancer [facility])     path report from thyroid bx- + 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 Mild mitral and tricuspid regurgitation is present    Heart abnormality     heart murmur    Kidney stone     \"use to see Dr Letitia Dolan, last kidney stone over a year ago, started approx 4 yrs ago- passed some in past\"    Murmur, heart     \"told I had this with pregnancy in 2008, no echo, do have occ palpitations when lay on right side\"    PONV (postoperative nausea and vomiting)     denies any motion sickness     Allergies   Allergen Reactions    Latex Swelling and Rash     Swelling and rash    Ciprofloxacin      Right arm went numb; heart rate racing (was give cipro/flagyl combo at hospital)     Prior to Admission medications    Medication Sig Start Date End Date Taking?  Authorizing Provider   ondansetron (ZOFRAN ODT) 4 MG disintegrating tablet Take 1 tablet by mouth every 8 hours as needed for Nausea 3/28/22  Yes Jack Live,    [address] [address] 0.25-35 MG-MCG per tablet TAKE 1 TABLET BY MOUTH ONCE DAILY 3/23/22   Historical Provider, MD   albuterol sulfate HFA (VENTOLIN HFA) 108 (90 Base) MCG/ACT inhaler Inhale 2 puffs into the lungs 4 times daily as needed for Wheezing 1/8/22   Amanda Ambriz MD   SLYND 4 MG TABS Take 4 mg by mouth daily 10/24/21   Historical Provider, MD   melatonin 10 MG CAPS capsule Take 30 mg by mouth nightly    Historical Provider, MD   medical marijuana Take by mouth as needed. Historical Provider, MD   liothyronine (CYTOMEL) 5 MCG tablet Take 5 mcg by mouth 2 times daily 5 mcg in the am and 2.5mcg in pm 4/21/20   Historical Provider, MD   levothyroxine (SYNTHROID) 100 MCG tablet Take 100 mcg by mouth Daily  2/14/19   Historical Provider, MD   diphenhydrAMINE (BENADRYL) 25 MG tablet Take 100 mg by mouth  Patient not taking: Reported on 3/28/2022    Historical Provider, MD       BP (!) 120/44   Pulse 52   Temp 98.8 °F (37.1 °C) (Oral)   Resp 16   Ht 5' 1\" (1.549 m)   Wt 112 lb (50.8 kg)   SpO2 95%   BMI 21.16 kg/m²     Physical Exam  Vitals and nursing note reviewed. Constitutional:       Appearance: She is well-developed. HENT:      Head: Normocephalic and atraumatic. Right Ear: External ear normal.      Left Ear: External ear normal.      Nose: Nose normal.   Eyes:      Conjunctiva/sclera: Conjunctivae normal.      Pupils: Pupils are equal, round, and reactive to light. Cardiovascular:      Rate and Rhythm: Normal rate and regular rhythm. Heart sounds: Normal heart sounds. Pulmonary:      Effort: Pulmonary effort is normal.      Breath sounds: Normal breath sounds. Abdominal:      General: Bowel sounds are normal.      Palpations: Abdomen is soft. Tenderness: There is abdominal tenderness. There is right CVA tenderness, left CVA tenderness, guarding and rebound. Positive signs include Chacon's sign, Rovsing's sign, McBurney's sign, psoas sign and obturator sign. Genitourinary:     Adnexa: Right adnexa normal and left adnexa normal.   Musculoskeletal:         General: Normal range of motion.       Cervical back: Normal range of motion and neck supple. Skin:     General: Skin is warm and dry. Neurological:      General: No focal deficit present. Mental Status: She is alert and oriented to person, place, and time. GCS: GCS eye subscore is 4. GCS verbal subscore is 5. GCS motor subscore is 6. Psychiatric:         Behavior: Behavior normal.         Thought Content: Thought content normal.         Judgment: Judgment normal.         MDM:    Labs Reviewed   CBC WITH AUTO DIFFERENTIAL - Abnormal; Notable for the following components:       Result Value    Hemoglobin 12.1 (*)     Hematocrit 36.8 (*)     Segs Relative 68.1 (*)     Lymphocytes % 23.7 (*)     Monocytes % 6.3 (*)     All other components within normal limits   COMPREHENSIVE METABOLIC PANEL - Abnormal; Notable for the following components: Total Protein 6.3 (*)     All other components within normal limits   URINALYSIS - Abnormal; Notable for the following components:    Ketones, Urine 40 (*)     Cast Type NEGATIVE (*)     All other components within normal limits   LIPASE   PREGNANCY, URINE       No orders to display        IV fluids 100 ml bolus and zofran for her nausea. She was given Morphine for her \"abdominal cramps\". I have discussed with the patient  my clinical impression and the result of the patient's current clinical evaluation for their presentation. In addition we discussed the risk and benefits of further testing and hospitalization. I discussed candidly with the patient  and the patient  was allowed to provide input as to their thoughts concerning the current presentation. Although the risk of progression or development of new more serious signs and symptoms cannot be excluded her repeat examination is benign and she has been able to take 12 pounces by mouth of water. She got 1000 ml bolus of 0.9 NS.  She would like to try to go home   My typical dicussion, presentation,and considerations for this patients' chief complaint, diagnosis, and

## 2022-03-29 ENCOUNTER — APPOINTMENT (OUTPATIENT)
Dept: CT IMAGING | Age: 37
End: 2022-03-29
Payer: COMMERCIAL

## 2022-03-29 ENCOUNTER — HOSPITAL ENCOUNTER (EMERGENCY)
Age: 37
Discharge: HOME | End: 2022-03-29
Attending: EMERGENCY MEDICINE
Payer: COMMERCIAL

## 2022-03-29 VITALS
DIASTOLIC BLOOD PRESSURE: 73 MMHG | WEIGHT: 112 LBS | HEART RATE: 67 BPM | OXYGEN SATURATION: 98 % | RESPIRATION RATE: 17 BRPM | TEMPERATURE: 99 F | BODY MASS INDEX: 21.14 KG/M2 | SYSTOLIC BLOOD PRESSURE: 112 MMHG | HEIGHT: 61 IN

## 2022-03-29 DIAGNOSIS — R11.2 NAUSEA AND VOMITING IN ADULT PATIENT: Primary | ICD-10-CM

## 2022-03-29 LAB
ALBUMIN SERPL-MCNC: 4.5 GM/DL (ref 3.4–5)
ALP BLD-CCNC: 45 IU/L (ref 40–129)
ALT SERPL-CCNC: 9 U/L (ref 10–40)
ANION GAP SERPL CALCULATED.3IONS-SCNC: 15 MMOL/L (ref 4–16)
AST SERPL-CCNC: 13 IU/L (ref 15–37)
BASOPHILS ABSOLUTE: 0 K/CU MM
BASOPHILS RELATIVE PERCENT: 0.4 % (ref 0–1)
BILIRUB SERPL-MCNC: 0.6 MG/DL (ref 0–1)
BUN BLDV-MCNC: 10 MG/DL (ref 6–23)
CALCIUM SERPL-MCNC: 8.4 MG/DL (ref 8.3–10.6)
CHLORIDE BLD-SCNC: 99 MMOL/L (ref 99–110)
CO2: 21 MMOL/L (ref 21–32)
CREAT SERPL-MCNC: 0.6 MG/DL (ref 0.6–1.1)
DIFFERENTIAL TYPE: ABNORMAL
EOSINOPHILS ABSOLUTE: 0 K/CU MM
EOSINOPHILS RELATIVE PERCENT: 0.2 % (ref 0–3)
GFR AFRICAN AMERICAN: >60 ML/MIN/1.73M2
GFR NON-AFRICAN AMERICAN: >60 ML/MIN/1.73M2
GLUCOSE BLD-MCNC: 99 MG/DL (ref 70–99)
HCT VFR BLD CALC: 37.5 % (ref 37–47)
HEMOGLOBIN: 12.7 GM/DL (ref 12.5–16)
IMMATURE NEUTROPHIL %: 0.4 % (ref 0–0.43)
LIPASE: 23 IU/L (ref 13–60)
LYMPHOCYTES ABSOLUTE: 1.7 K/CU MM
LYMPHOCYTES RELATIVE PERCENT: 18 % (ref 24–44)
MCH RBC QN AUTO: 28.7 PG (ref 27–31)
MCHC RBC AUTO-ENTMCNC: 33.9 % (ref 32–36)
MCV RBC AUTO: 84.7 FL (ref 78–100)
MONOCYTES ABSOLUTE: 0.7 K/CU MM
MONOCYTES RELATIVE PERCENT: 7.2 % (ref 0–4)
PDW BLD-RTO: 12.8 % (ref 11.7–14.9)
PLATELET # BLD: 229 K/CU MM (ref 140–440)
PMV BLD AUTO: 11.3 FL (ref 7.5–11.1)
POTASSIUM SERPL-SCNC: 4 MMOL/L (ref 3.5–5.1)
RBC # BLD: 4.43 M/CU MM (ref 4.2–5.4)
SEGMENTED NEUTROPHILS ABSOLUTE COUNT: 7.1 K/CU MM
SEGMENTED NEUTROPHILS RELATIVE PERCENT: 73.8 % (ref 36–66)
SODIUM BLD-SCNC: 135 MMOL/L (ref 135–145)
TOTAL IMMATURE NEUTOROPHIL: 0.04 K/CU MM
TOTAL PROTEIN: 6.4 GM/DL (ref 6.4–8.2)
WBC # BLD: 9.7 K/CU MM (ref 4–10.5)

## 2022-03-29 PROCEDURE — 83690 ASSAY OF LIPASE: CPT

## 2022-03-29 PROCEDURE — 96361 HYDRATE IV INFUSION ADD-ON: CPT

## 2022-03-29 PROCEDURE — 6360000002 HC RX W HCPCS: Performed by: EMERGENCY MEDICINE

## 2022-03-29 PROCEDURE — 2580000003 HC RX 258: Performed by: EMERGENCY MEDICINE

## 2022-03-29 PROCEDURE — 74176 CT ABD & PELVIS W/O CONTRAST: CPT

## 2022-03-29 PROCEDURE — 85025 COMPLETE CBC W/AUTO DIFF WBC: CPT

## 2022-03-29 PROCEDURE — 96374 THER/PROPH/DIAG INJ IV PUSH: CPT

## 2022-03-29 PROCEDURE — 80053 COMPREHEN METABOLIC PANEL: CPT

## 2022-03-29 PROCEDURE — 96375 TX/PRO/DX INJ NEW DRUG ADDON: CPT

## 2022-03-29 PROCEDURE — 99283 EMERGENCY DEPT VISIT LOW MDM: CPT

## 2022-03-29 RX ORDER — 0.9 % SODIUM CHLORIDE 0.9 %
2000 INTRAVENOUS SOLUTION INTRAVENOUS ONCE
Status: COMPLETED | OUTPATIENT
Start: 2022-03-29 | End: 2022-03-29

## 2022-03-29 RX ORDER — DIPHENHYDRAMINE HYDROCHLORIDE 50 MG/ML
50 INJECTION INTRAMUSCULAR; INTRAVENOUS ONCE
Status: COMPLETED | OUTPATIENT
Start: 2022-03-29 | End: 2022-03-29

## 2022-03-29 RX ORDER — METOCLOPRAMIDE HYDROCHLORIDE 5 MG/ML
10 INJECTION INTRAMUSCULAR; INTRAVENOUS ONCE
Status: COMPLETED | OUTPATIENT
Start: 2022-03-29 | End: 2022-03-29

## 2022-03-29 RX ADMIN — DIPHENHYDRAMINE HYDROCHLORIDE 50 MG: 50 INJECTION, SOLUTION INTRAMUSCULAR; INTRAVENOUS at 20:48

## 2022-03-29 RX ADMIN — SODIUM CHLORIDE 2000 ML: 9 INJECTION, SOLUTION INTRAVENOUS at 20:49

## 2022-03-29 RX ADMIN — METOCLOPRAMIDE HYDROCHLORIDE 10 MG: 5 INJECTION INTRAMUSCULAR; INTRAVENOUS at 20:48

## 2022-03-29 ASSESSMENT — ENCOUNTER SYMPTOMS
RESPIRATORY NEGATIVE: 1
NAUSEA: 1
VOMITING: 1
EYES NEGATIVE: 1

## 2022-03-29 NOTE — ED TRIAGE NOTES
Was here yesterday for the same. Went home and vomited all night. Not making much urine. Dizzy and fatigued.

## 2022-03-30 NOTE — ED PROVIDER NOTES
Nausea & Vomiting  The patient originally been seen in the emergency department yesterday for nausea vomiting as well as abdominal pain. The patient was having severe abdominal cramps and abdominal pain when she was seen yesterday. The patient had diffuse tenderness in both her left and right upper quadrant as well as her suprapubic area and right lower quadrant. Reexamination of the patient after being given medication to help with nausea and vomiting as well as helping her with her cramps she improved she was able to take p.o. and she was discharged home. However the patient woke up around 3 or 4:00 in the morning and she was having recurrent vomiting episodes. The patient states that this primarily stomach contents and then was more bilious in nature. The patient states that she is also not had much urination. Patient was unable to take the Phenergan that was given to her and she has not used any of the Zofran. The patient states that she is not having any abdominal pain. The patient also denies any fevers or chills and she has not been having any diarrhea. Patient denies any upper respiratory signs or symptoms sore throat headaches myalgias or arthralgias. The primary reason for returning to the emergency department was that she continues to have the nausea and vomiting only this time she is not having any of the associated abdominal pain. Patient also states that she feels fatigued and lightheadedness    Review of Systems   Constitutional: Positive for activity change and appetite change. HENT: Negative. Eyes: Negative. Respiratory: Negative. Cardiovascular: Negative. Gastrointestinal: Positive for nausea and vomiting. Genitourinary: Negative. Musculoskeletal: Negative. Skin: Negative. Neurological: Negative. All other systems reviewed and are negative.       Family History   Problem Relation Age of Onset    Arthritis Mother     High Blood Pressure Mother     Other Mother         lupus & fibromyalgia    Ovarian Cancer Mother     Arthritis Father     Mental Retardation Paternal Uncle     Arthritis Maternal Grandmother     Diabetes Maternal Grandmother     High Blood Pressure Maternal Grandmother     Other Maternal Grandmother         fibromyalgia    Ovarian Cancer Maternal Grandmother     Arthritis Maternal Grandfather     Diabetes Maternal Grandfather     High Blood Pressure Maternal Grandfather     Arthritis Paternal Grandmother     Heart Attack Paternal Grandmother     Arthritis Paternal Grandfather     Heart Attack Paternal Grandfather      Social History     Socioeconomic History    Marital status:      Spouse name: Not on file    Number of children: Not on file    Years of education: Not on file    Highest education level: Not on file   Occupational History    Not on file   Tobacco Use    Smoking status: Former Smoker     Packs/day: 0.50     Years: 12.00     Pack years: 6.00     Types: Cigarettes    Smokeless tobacco: Never Used    Tobacco comment: \"trying to quit\"   Vaping Use    Vaping Use: Some days    Substances: Never, THC   Substance and Sexual Activity    Alcohol use: No    Drug use: Yes     Types: Marijuana Ricklinda Simons)     Comment: medical marijuana    Sexual activity: Yes     Partners: Male   Other Topics Concern    Not on file   Social History Narrative    Not on file     Social Determinants of Health     Financial Resource Strain:     Difficulty of Paying Living Expenses: Not on file   Food Insecurity:     Worried About Running Out of Food in the Last Year: Not on file    Brooke of Food in the Last Year: Not on file   Transportation Needs:     Lack of Transportation (Medical): Not on file    Lack of Transportation (Non-Medical):  Not on file   Physical Activity:     Days of Exercise per Week: Not on file    Minutes of Exercise per Session: Not on file   Stress:     Feeling of Stress : Not on file   Social Connections:  Frequency of Communication with Friends and Family: Not on file    Frequency of Social Gatherings with Friends and Family: Not on file    Attends Bahai Services: Not on file    Active Member of [address] [place] [address] or Organizations: Not on file    Attends Club or Organization Meetings: Not on file    Marital Status: Not on file   Intimate Partner Violence:     Fear of Current or Ex-Partner: Not on file    Emotionally Abused: Not on file    Physically Abused: Not on file    Sexually Abused: Not on file   Housing Stability:     Unable to Pay for Housing in the Last Year: Not on file    Number of [place] in the Last Year: Not on file    Unstable Housing in the Last Year: Not on file     Past Surgical History:   Procedure Laterality Date    ABDOMEN SURGERY      uterine suspension, 2008   [address]  2009/2014    CHOLECYSTECTOMY      CYSTOSCOPY  2012    LAPAROSCOPY      x5- last one 2009   [address]      as a child    THYROIDECTOMY  10/17/2016    total thyroidectomy     TUBAL LIGATION  2016   [address]  2014     Past Medical History:   Diagnosis Date    Cancer [facility])     path report from thyroid bx- + 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  Mild mitral and tricuspid regurgitation is present    Heart abnormality     heart murmur    Kidney stone     \"use to see Dr Jackson Zavala, last kidney stone over a year ago, started approx 4 yrs ago- passed some in past\"    Murmur, heart     \"told I had this with pregnancy in 2008, no echo, do have occ palpitations when lay on right side\"    PONV (postoperative nausea and vomiting)     denies any motion sickness     Allergies   Allergen Reactions    Latex Swelling and Rash     Swelling and rash    Ciprofloxacin      Right arm went numb; heart rate racing (was give cipro/flagyl combo at hospital)     Prior to Admission medications    Medication Sig Start Date End Date Taking? Authorizing Provider   [address] [address] 0.25-35 MG-MCG per tablet TAKE 1 TABLET BY MOUTH ONCE DAILY 3/23/22   Historical Provider, MD   ondansetron (ZOFRAN ODT) 4 MG disintegrating tablet Take 1 tablet by mouth every 8 hours as needed for Nausea 3/28/22   Rafa Zarate Ray,    albuterol sulfate HFA (VENTOLIN HFA) 108 (90 Base) MCG/ACT inhaler Inhale 2 puffs into the lungs 4 times daily as needed for Wheezing 1/8/22   Mauricio Guerra MD   SLYND 4 MG TABS Take 4 mg by mouth daily 10/24/21   Historical Provider, MD   melatonin 10 MG CAPS capsule Take 30 mg by mouth nightly    Historical Provider, MD   medical marijuana Take by mouth as needed. Historical Provider, MD   liothyronine (CYTOMEL) 5 MCG tablet Take 5 mcg by mouth 2 times daily 5 mcg in the am and 2.5mcg in pm 4/21/20   Historical Provider, MD   levothyroxine (SYNTHROID) 100 MCG tablet Take 100 mcg by mouth Daily  2/14/19   Historical Provider, MD   diphenhydrAMINE (BENADRYL) 25 MG tablet Take 100 mg by mouth  Patient not taking: Reported on 3/28/2022    Historical Provider, MD       /73   Pulse 67   Temp 99 °F (37.2 °C) (Oral)   Resp 17   Ht 5' 1\" (1.549 m)   Wt 112 lb (50.8 kg)   SpO2 98%   BMI 21.16 kg/m²     Physical Exam  Vitals and nursing note reviewed. Constitutional:       General: She is not in acute distress. Appearance: She is not diaphoretic. HENT:      Head: Normocephalic and atraumatic. Right Ear: Tympanic membrane normal.      Left Ear: Tympanic membrane normal.      Mouth/Throat:      Mouth: Mucous membranes are dry. Eyes:      Extraocular Movements: Extraocular movements intact. Conjunctiva/sclera: Conjunctivae normal.      Pupils: Pupils are equal, round, and reactive to light. Cardiovascular:      Rate and Rhythm: Normal rate. Pulses: Normal pulses. Pulmonary:      Effort: Pulmonary effort is normal.      Breath sounds: Normal breath sounds. Abdominal:      General: Abdomen is flat. There is no distension. Palpations: Abdomen is soft. Tenderness: There is no abdominal tenderness. There is no right CVA tenderness, left CVA tenderness, guarding or rebound. Comments: Patient has no peritoneal signs. The patient has no McBurney's point tenderness she has a negative Rovsing's negative obturator and a negative Chacon sign patient has no abdominal tenderness on this evening's examination   Musculoskeletal:         General: No deformity. Skin:     Capillary Refill: Capillary refill takes 2 to 3 seconds. Coloration: Skin is pale. Neurological:      General: No focal deficit present. Mental Status: Mental status is at baseline. MDM:    Labs Reviewed   CBC WITH AUTO DIFFERENTIAL - Abnormal; Notable for the following components:       Result Value    MPV 11.3 (*)     Segs Relative 73.8 (*)     Lymphocytes % 18.0 (*)     Monocytes % 7.2 (*)     All other components within normal limits   COMPREHENSIVE METABOLIC PANEL - Abnormal; Notable for the following components:    ALT 9 (*)     AST 13 (*)     All other components within normal limits   LIPASE       CT ABDOMEN PELVIS WO CONTRAST Additional Contrast? None   Final Result   1. No acute intraabdominal process identified. 2. The appendix is visualized and appears grossly unremarkable with no   definitive CT evidence of appendicitis. 3. No obstructive uropathy identified. The patient blood work is unremarkable. She has no electrolyte abnormalities due to emesis. There is no concentration of urine by specific gravity and there is no ketones in her urine to suggest dehydration or decreased PO intake. The low Co2 is a chronic laboratory finding in our lab in [facility] due to issues with the machine running this lab. This is an ongoing issue and expected.    I have discussed with the patient  my clinical impression and the result of the patient's current clinical evaluation for their presentation. In addition we discussed the risk and benefits of further testing and hospitalization. I discussed candidly with the patient  and the patient  was allowed to provide input as to their thoughts concerning the current presentation. Although the risk of progression or development of new more serious signs and symptoms cannot be excluded her abdominal exam is once again benign and her CT is negative. She is not dehydrated and she does not require further intervention. Follow up PCP and I recommend referral from PCP to GI if this is persistent but this referral does not have to be emergent from ER   My typical dicussion, presentation,and considerations for this patients' chief complaint, diagnosis, and differential diagnosis have been considered. I have stressed need for follow up and reexamination for this encounter. I have discussed my clinical impression and the results of the current evaluation. Final Impression    1.  Nausea and vomiting in adult patient              [address],   03/29/22 [id number]

## 2022-04-06 ENCOUNTER — HOSPITAL ENCOUNTER (OUTPATIENT)
Dept: INFUSION THERAPY | Age: 37
Discharge: HOME | End: 2022-04-06
Payer: COMMERCIAL

## 2022-04-06 DIAGNOSIS — D50.0 IRON DEFICIENCY ANEMIA SECONDARY TO BLOOD LOSS (CHRONIC): ICD-10-CM

## 2022-04-06 DIAGNOSIS — K90.9 INTESTINAL MALABSORPTION, UNSPECIFIED TYPE: ICD-10-CM

## 2022-04-06 LAB
ALBUMIN SERPL-MCNC: 4.6 GM/DL (ref 3.4–5)
ALP BLD-CCNC: 48 IU/L (ref 40–129)
ALT SERPL-CCNC: 21 U/L (ref 10–40)
ANION GAP SERPL CALCULATED.3IONS-SCNC: 7 MMOL/L (ref 4–16)
AST SERPL-CCNC: 19 IU/L (ref 15–37)
BASOPHILS ABSOLUTE: 0 K/CU MM
BASOPHILS RELATIVE PERCENT: 0.7 % (ref 0–1)
BILIRUB SERPL-MCNC: 0.3 MG/DL (ref 0–1)
BUN BLDV-MCNC: 12 MG/DL (ref 6–23)
CALCIUM SERPL-MCNC: 8.4 MG/DL (ref 8.3–10.6)
CHLORIDE BLD-SCNC: 103 MMOL/L (ref 99–110)
CO2: 26 MMOL/L (ref 21–32)
CREAT SERPL-MCNC: 0.6 MG/DL (ref 0.6–1.1)
DIFFERENTIAL TYPE: ABNORMAL
EOSINOPHILS ABSOLUTE: 0.2 K/CU MM
EOSINOPHILS RELATIVE PERCENT: 2.7 % (ref 0–3)
ERYTHROCYTE SEDIMENTATION RATE: 4 MM/HR (ref 0–20)
FERRITIN: 209 NG/ML (ref 15–150)
GFR AFRICAN AMERICAN: >60 ML/MIN/1.73M2
GFR NON-AFRICAN AMERICAN: >60 ML/MIN/1.73M2
GLUCOSE BLD-MCNC: 87 MG/DL (ref 70–99)
HCT VFR BLD CALC: 36.2 % (ref 37–47)
HEMOGLOBIN: 12 GM/DL (ref 12.5–16)
IRON: 105 UG/DL (ref 37–145)
LACTATE DEHYDROGENASE: 158 IU/L (ref 120–246)
LYMPHOCYTES ABSOLUTE: 2.1 K/CU MM
LYMPHOCYTES RELATIVE PERCENT: 34.8 % (ref 24–44)
MCH RBC QN AUTO: 29.3 PG (ref 27–31)
MCHC RBC AUTO-ENTMCNC: 33.1 % (ref 32–36)
MCV RBC AUTO: 88.3 FL (ref 78–100)
MONOCYTES ABSOLUTE: 0.5 K/CU MM
MONOCYTES RELATIVE PERCENT: 7.7 % (ref 0–4)
PCT TRANSFERRIN: 42 % (ref 10–44)
PDW BLD-RTO: 14.2 % (ref 11.7–14.9)
PLATELET # BLD: 226 K/CU MM (ref 140–440)
PMV BLD AUTO: 11.3 FL (ref 7.5–11.1)
POTASSIUM SERPL-SCNC: 4.6 MMOL/L (ref 3.5–5.1)
RBC # BLD: 4.1 M/CU MM (ref 4.2–5.4)
RETICULOCYTE COUNT PCT: 1.6 % (ref 0.2–2.2)
SEGMENTED NEUTROPHILS ABSOLUTE COUNT: 3.2 K/CU MM
SEGMENTED NEUTROPHILS RELATIVE PERCENT: 54.1 % (ref 36–66)
SODIUM BLD-SCNC: 136 MMOL/L (ref 135–145)
TOTAL IRON BINDING CAPACITY: 251 UG/DL (ref 250–450)
TOTAL PROTEIN: 6.5 GM/DL (ref 6.4–8.2)
UNSATURATED IRON BINDING CAPACITY: 146 UG/DL (ref 110–370)
WBC # BLD: 5.9 K/CU MM (ref 4–10.5)

## 2022-04-06 PROCEDURE — 80053 COMPREHEN METABOLIC PANEL: CPT

## 2022-04-06 PROCEDURE — 85045 AUTOMATED RETICULOCYTE COUNT: CPT

## 2022-04-06 PROCEDURE — 83540 ASSAY OF IRON: CPT

## 2022-04-06 PROCEDURE — 83615 LACTATE (LD) (LDH) ENZYME: CPT

## 2022-04-06 PROCEDURE — 85025 COMPLETE CBC W/AUTO DIFF WBC: CPT

## 2022-04-06 PROCEDURE — 82728 ASSAY OF FERRITIN: CPT

## 2022-04-06 PROCEDURE — 85652 RBC SED RATE AUTOMATED: CPT

## 2022-04-06 PROCEDURE — 82607 VITAMIN B-12: CPT

## 2022-04-06 PROCEDURE — 83550 IRON BINDING TEST: CPT

## 2022-04-06 PROCEDURE — 82746 ASSAY OF FOLIC ACID SERUM: CPT

## 2022-04-06 PROCEDURE — 36415 COLL VENOUS BLD VENIPUNCTURE: CPT

## 2022-04-22 ENCOUNTER — OFFICE VISIT (OUTPATIENT)
Dept: ONCOLOGY | Age: 37
End: 2022-04-22
Payer: COMMERCIAL

## 2022-04-22 ENCOUNTER — HOSPITAL ENCOUNTER (OUTPATIENT)
Dept: INFUSION THERAPY | Age: 37
Discharge: HOME | End: 2022-04-22

## 2022-04-22 VITALS
SYSTOLIC BLOOD PRESSURE: 102 MMHG | TEMPERATURE: 96.9 F | WEIGHT: 113.8 LBS | HEART RATE: 65 BPM | BODY MASS INDEX: 21.49 KG/M2 | OXYGEN SATURATION: 98 % | HEIGHT: 61 IN | DIASTOLIC BLOOD PRESSURE: 52 MMHG | RESPIRATION RATE: 16 BRPM

## 2022-04-22 DIAGNOSIS — D50.0 IRON DEFICIENCY ANEMIA SECONDARY TO BLOOD LOSS (CHRONIC): Primary | ICD-10-CM

## 2022-04-22 PROCEDURE — G8420 CALC BMI NORM PARAMETERS: HCPCS | Performed by: INTERNAL MEDICINE

## 2022-04-22 PROCEDURE — 99213 OFFICE O/P EST LOW 20 MIN: CPT | Performed by: INTERNAL MEDICINE

## 2022-04-22 PROCEDURE — 1036F TOBACCO NON-USER: CPT | Performed by: INTERNAL MEDICINE

## 2022-04-22 PROCEDURE — G8427 DOCREV CUR MEDS BY ELIG CLIN: HCPCS | Performed by: INTERNAL MEDICINE

## 2022-04-22 ASSESSMENT — PATIENT HEALTH QUESTIONNAIRE - PHQ9
SUM OF ALL RESPONSES TO PHQ9 QUESTIONS 1 & 2: 0
SUM OF ALL RESPONSES TO PHQ QUESTIONS 1-9: 0
2. FEELING DOWN, DEPRESSED OR HOPELESS: 0
1. LITTLE INTEREST OR PLEASURE IN DOING THINGS: 0
SUM OF ALL RESPONSES TO PHQ QUESTIONS 1-9: 0

## 2022-04-22 NOTE — PROGRESS NOTES
Patient Name: Earnestine Dance  Patient : 1985  Patient MRN: [id number]     Primary Oncologist: Sydney Denton MD  Referring Provider: ADOLFO Hernandez NP     Date of Service: 2022      Chief Complaint:   Chief Complaint   Patient presents with    Discuss Labs     Patient Active Problem List:     Iron deficiency anemia secondary to blood loss (chronic)     Lymphadenopathy     Malignant neoplasm of thyroid gland     HPI:   Ms. Tae García is a very pleasant 71-year-old patient with medical history significant for papillary thyroid carcinoma diagnosed on October 3, 2016, status post total thyroidectomy on 2016, initially referred to me on 2017, for evaluation of cervical lymphadenopathy. She stated that she had soft tissue neck ultrasound on 2016, for swollen neck glands. She was found to have solid, 2.8 cm inferior right thyroid lobe nodule with microcalcifications. Radiologist strongly recommends fine-needle aspiration. She underwent fine-needle aspiration on 2016, and the final pathology was consistent with papillary carcinoma. She was subsequently evaluated by Dr. Concha Valadez and she underwent total thyroidectomy on 2016. Final pathology revealed 2.5 cm encapsulated papillary carcinoma. It was low-grade tumor and margins were negative. There was no tumor capsular invasion, no lymphvascular invasion, no perineural invasion, and there was no extra thyroid extension. No lymph nodes were submitted for evaluation (pT2 pNx pMx). She was started with Levothyroxine since then and she has been followed closely by primary care physician. She started noticing palpable lymphadenopathy in the bilateral neck and inguinal area.   She underwent soft tissue neck sonogram on 2016, and she was found to have prominent bilateral cervical lymph nodes, likely benign given the short axis diameter is less than 1 cm (there is a lymph node measuring 2.7 into 0.6 into 1.2 cm in the right side of the neck, there is additional lymph node along the right side of the neck measuring 1.9 into 1.9 into 0.5 cm). (There is a lymph node measuring 1.9 into 0.4 into 0.8 cm along the left side of the neck inferiorly. Along the posterior aspect of the left side of the neck, there is a lymph node with fatty hilar measuring 2.2 into 0.7 into 2 cm). She stated that her lymph node resolved spontaneously after that and it returned again one to two weeks ago. She has some enlarged and tender lymphadenopathy in the left side of her neck especially in the posterior triangle, right side of the neck and supraclavicular area. Because of findings on the physical examination, she was subsequently referred to me for further evaluation. She is currently on Levothyroxine 100 mcg and her last TSH level was 1.23 on the blood tests done on February 8, 2017. She never had radioactive iodine therapy. Family history is significant for thyroid cancer in one of her aunts. Ms. Tae García had repeat sonogram of the soft tissue neck on March 14, 2017, and it showed no significant change in bilateral lymph nodes measuring less than 1 cm in thickness. The lymph nodes are not pathologically enlarged by imaging criteria. She also had pelvic sonogram on March 14, 2017, and it showed bilateral normal appearing inguinal lymph nodes which correlate to the palpable area of interest, not significantly changed since December 22, 2016. Findings are likely benign and recommend followup, if clinically indicated. Laboratory workups done on March 6, 2017, showed normal complete blood count and normal complete metabolic panel. Her thyroglobulin was 0.3, thyroglobulin antibody - less than 0.9, and TSH was 0.849. She was subsequently evaluated by Dr. Analilia Mathias and she had thyroid scan which was inconclusive.  She is also following regularly with endocrinologist at [facility] [employer] [facility] [employer]. Her endocrinologist does not recommend her to have radioiodine therapy and she has been followed closely since then. She has laboratory workups on 2018 for her my anemia and thrombocytopenia. All the workups were normal except she was found to have severe iron deficiency. BCR/ABL analysis was sent and she is negative for BCR/ABL translocations. I recognize that she has significant iron deficiency on recent blood test done on 2021. Since she has significant iron deficiency and she could not tolerate oral iron supplementation, we gave injectafer infusion on 4/15,2021. On 2022, she presented to me for follow up. I have been following her for iron deficiency anemia, relative eosinophilia and history of papillary thyroid cancer. I believe her iron deficiency anemia is due to menorrhagia. She stated that she stopped taking ferrous sulfate since it causes her severe constipation. She received iron infusion on April 15, 2021 [total 1500 mg] and she does not require any more iron infusions since then. She stated that her general sense of wellbeing is getting much improved with iron infusion. I recognize that she has normal iron status on 22 blood test and she does not need any iron infusion at this moment. I recommend her to have repeat iron panel in 6 months. She stated that she recently changed her birth control pills and she started having menorrhagia since then. Constipation - she is on senna. Recommend her to add colace. She does not have any other significant symptoms at today's visit. PAST MEDICAL HISTORY:  Past medical history significant for:  1. Papillary thyroid cancer. 2.      Migraine headaches. PAST SURGICAL HISTORY:  Past surgical history significant for:  1. Tubal ligation. 2.      Uterine suspension. 3.      Two  sections. 4.      Total thyroidectomy.      FAMILY HISTORY:  Significant for cervical cancer in her great grandmother and thyroid cancer in one of her aunts. No other family history of cancer disease. SOCIAL HISTORY:  She is a current smoker and she smokes about ten cigarettes a day for more than ten years. She denies alcohol drinking and illicit drug abuse. She was  before and she  in 2013. ALLERGIES:  She claims to have allergies to latex. Review of Systems: \"Per interval history; otherwise 10 point ROS is negative. \"  Her energy level is stable and her sleep is fine. She doesn't have fever, chills, night sweats, cough, shortness of breath, chest pain, hemoptysis or palpitations. Her bowels and bladder functions are normal. She denies nausea, vomiting, abdominal pain, diarrhea, constipation, dysuria, loss of appetite or  weight loss. She doesn't have neuropathy and she denies bleeding or clotting issues. She doesn't have any pain in her body. Denies anxiety or depression today. The rest of the systems are unremarkable.       Vital Signs:  BP (!) 102/52 (Site: Left Upper Arm, Position: Sitting, Cuff Size: Medium Adult)   Pulse 65   Temp 96.9 °F (36.1 °C) (Infrared)   Resp 16   Ht 5' 1\" (1.549 m)   Wt 113 lb 12.8 oz (51.6 kg)   SpO2 98%   BMI 21.50 kg/m²     Physical Exam:  CONSTITUTIONAL: awake, alert, cooperative, no apparent distress   EYES: pupils equal, round and reactive to light, sclera clear and conjunctiva normal  ENT: Normocephalic, without obvious abnormality, atraumatic  NECK: supple, symmetrical, no jugular venous distension and no carotid bruits   HEMATOLOGIC/LYMPHATIC: no cervical, supraclavicular or axillary lymphadenopathy   LUNGS: VBS, no wheezes, no rhonchi, no increased work of breathing, clear to auscultation, no crackles,   CARDIOVASCULAR: regular rate and rhythm, normal S1 and S2, no murmur noted  ABDOMEN: normal bowel sounds x 4, soft, non-distended, non-tender, no masses palpated, no hepatosplenomegaly   MUSCULOSKELETAL: full range of motion noted, tone is normal  NEUROLOGIC: awake, alert, oriented to name, place and time. Motor skills grossly intact. SKIN: Normal skin color, texture, turgor and no jaundice.  appears intact   EXTREMITIES: no LE edema, no cyanosis, no clubbing, no leg swelling,     Labs:  Hematology:  Lab Results   Component Value Date    WBC 5.9 04/06/2022    RBC 4.10 (L) 04/06/2022    HGB 12.0 (L) 04/06/2022    HCT 36.2 (L) 04/06/2022    MCV 88.3 04/06/2022    MCH 29.3 04/06/2022    MCHC 33.1 04/06/2022    RDW 14.2 04/06/2022     04/06/2022    MPV 11.3 (H) 04/06/2022    SEGSPCT 54.1 04/06/2022    EOSRELPCT 2.7 04/06/2022    BASOPCT 0.7 04/06/2022    LYMPHOPCT 34.8 04/06/2022    MONOPCT 7.7 (H) 04/06/2022    SEGSABS 3.2 04/06/2022    EOSABS 0.2 04/06/2022    BASOSABS 0.0 04/06/2022    LYMPHSABS 2.1 04/06/2022    MONOSABS 0.5 04/06/2022    DIFFTYPE AUTOMATED DIFFERENTIAL 04/06/2022    POLYCHROM 1+ 04/02/2018    PLTM PLATELETS APPEAR NORMAL 04/02/2018     Lab Results   Component Value Date    ESR 4 04/06/2022     Chemistry:  Lab Results   Component Value Date     04/06/2022    K 4.6 04/06/2022     04/06/2022    CO2 26 04/06/2022    BUN 12 04/06/2022    CREATININE 0.6 04/06/2022    GLUCOSE 87 04/06/2022    CALCIUM 8.4 04/06/2022    PROT 6.5 04/06/2022    LABALBU 4.6 04/06/2022    BILITOT 0.3 04/06/2022    ALKPHOS 48 04/06/2022    AST 19 04/06/2022    ALT 21 04/06/2022    LABGLOM >60 04/06/2022    GFRAA >60 04/06/2022     Lab Results   Component Value Date     04/06/2022     No components found for: LD  Lab Results   Component Value Date    TSHHS 0.037 (L) 04/19/2021    T4FREE 1.52 04/19/2021    FT3 3.3 07/13/2020     Immunology:  Lab Results   Component Value Date    PROT 6.5 04/06/2022     No results found for: Martin Anton, WILMANR  No results found for: B2M  Coagulation Panel:  Lab Results   Component Value Date    PROTIME 11.2 09/29/2016    INR 0.98 09/29/2016    APTT 23.2 09/29/2016 DDIMER <200 07/14/2018     Anemia Panel:  Lab Results   Component Value Date    AQCISTND52 414.4 04/02/2018    FOLATE 13.5 04/06/2022     Tumor Markers:  No results found for: , CEA, , LABCA2, PSA  Observations:  PHQ-9 Total Score: 0 (4/22/2022 11:42 AM)     Assessment & Plan:   1. Cervical and inguinal lymphadenopathy. 2.      Papillary thyroid carcinoma. 3.      Normocytic normochromic anemia    PLAN:  Ms. Ama Cochran has been followed for iron deficiency anemia, relative eosinophilia and history of papillary thyroid cancer. I recognize that she has significant iron deficiency on recent blood test done on March 18, 2021. Since she has significant iron deficiency and she could not tolerate oral iron supplementation, we gave injectafer infusion on 4/15,2021. On April 22, 2022, she presented to me for follow up. I have been following her for iron deficiency anemia, relative eosinophilia and history of papillary thyroid cancer. I believe her iron deficiency anemia is due to menorrhagia. She stated that she stopped taking ferrous sulfate since it causes her severe constipation. She received iron infusion on April 15, 2021 [total 1500 mg] and she does not require any more iron infusions since then. She stated that her general sense of wellbeing is getting much improved with iron infusion. I recognize that she has normal iron status on 4/6/22 blood test and she does not need any iron infusion at this moment. I recommend her to have repeat iron panel in 6 months. She stated that she recently changed her birth control pills and she started having menorrhagia since then. Constipation - she is on senna. Recommend her to add colace. I answered all her questions and concerns for today. I asked her to follow up with primary care physician Dr. Veda Churchill on regular basis. Recent imaging and labs were reviewed and discussed with the patient.

## 2022-04-22 NOTE — PROGRESS NOTES
MA Rooming Questions  Patient: Arlyn Montague  MRN: [id number]    Date: 4/22/2022        1. Do you have any new issues? yes - Constipation         2. Do you need any refills on medications?    no    3. Have you had any imaging done since your last visit? yes - [place]    4. Have you been hospitalized or seen in the emergency room since your last visit here?   yes - [place]    5. Did the patient have a depression screening completed today?  Yes    PHQ-9 Total Score: 0 (4/22/2022 11:42 AM)       PHQ-9 Given to (if applicable):               PHQ-9 Score (if applicable):                     [] Positive     []  Negative              Does question #9 need addressed (if applicable)                     [] Yes    []  No               Myron Carson CMA

## 2022-04-24 LAB
FOLATE: 13.5 NG/ML (ref 3.1–17.5)
VITAMIN B-12: 241.9 PG/ML (ref 211–911)

## 2022-05-03 ENCOUNTER — CLINICAL DOCUMENTATION (OUTPATIENT)
Dept: ONCOLOGY | Age: 37
End: 2022-05-03

## 2022-05-03 NOTE — PROGRESS NOTES
Vit B12 is in low normal range per lab results. Physician requests that patient can take gummy multivitamin it to help with energy and B12 level. Called patient at [phone] to notify, voices understanding. No further needs addressed at this time.

## 2022-09-30 ENCOUNTER — HOSPITAL ENCOUNTER (OUTPATIENT)
Age: 37
Discharge: HOME | End: 2022-09-30
Payer: COMMERCIAL

## 2022-09-30 LAB
T4 FREE: 1.51 NG/DL (ref 0.9–1.8)
TSH HIGH SENSITIVITY: 0.06 UIU/ML (ref 0.27–4.2)

## 2022-09-30 PROCEDURE — 84439 ASSAY OF FREE THYROXINE: CPT

## 2022-09-30 PROCEDURE — 36415 COLL VENOUS BLD VENIPUNCTURE: CPT

## 2022-09-30 PROCEDURE — 84443 ASSAY THYROID STIM HORMONE: CPT

## 2022-09-30 PROCEDURE — 84480 ASSAY TRIIODOTHYRONINE (T3): CPT

## 2022-10-01 LAB — T3 TOTAL: 131 NG/DL (ref 80–200)

## 2022-10-18 ENCOUNTER — HOSPITAL ENCOUNTER (OUTPATIENT)
Dept: INFUSION THERAPY | Age: 37
Discharge: HOME | End: 2022-10-18
Payer: COMMERCIAL

## 2022-10-18 DIAGNOSIS — D50.0 IRON DEFICIENCY ANEMIA SECONDARY TO BLOOD LOSS (CHRONIC): ICD-10-CM

## 2022-10-18 LAB
ALBUMIN SERPL-MCNC: 4.3 GM/DL (ref 3.4–5)
ALP BLD-CCNC: 58 IU/L (ref 40–129)
ALT SERPL-CCNC: 9 U/L (ref 10–40)
ANION GAP SERPL CALCULATED.3IONS-SCNC: 8 MMOL/L (ref 4–16)
AST SERPL-CCNC: 13 IU/L (ref 15–37)
BASOPHILS ABSOLUTE: 0 K/CU MM
BASOPHILS RELATIVE PERCENT: 0.5 % (ref 0–1)
BILIRUB SERPL-MCNC: 0.2 MG/DL (ref 0–1)
BUN BLDV-MCNC: 14 MG/DL (ref 6–23)
CALCIUM SERPL-MCNC: 9 MG/DL (ref 8.3–10.6)
CHLORIDE BLD-SCNC: 105 MMOL/L (ref 99–110)
CO2: 28 MMOL/L (ref 21–32)
CREAT SERPL-MCNC: 0.6 MG/DL (ref 0.6–1.1)
DIFFERENTIAL TYPE: ABNORMAL
EOSINOPHILS ABSOLUTE: 0.3 K/CU MM
EOSINOPHILS RELATIVE PERCENT: 3.5 % (ref 0–3)
ERYTHROCYTE SEDIMENTATION RATE: 3 MM/HR (ref 0–20)
FERRITIN: 145 NG/ML (ref 15–150)
FOLATE: >20 NG/ML (ref 3.1–17.5)
GFR SERPL CREATININE-BSD FRML MDRD: >60 ML/MIN/1.73M2
GLUCOSE BLD-MCNC: 90 MG/DL (ref 70–99)
HCT VFR BLD CALC: 39.3 % (ref 37–47)
HEMOGLOBIN: 12.9 GM/DL (ref 12.5–16)
IRON: 113 UG/DL (ref 37–145)
LACTATE DEHYDROGENASE: 145 IU/L (ref 120–246)
LYMPHOCYTES ABSOLUTE: 2.8 K/CU MM
LYMPHOCYTES RELATIVE PERCENT: 35.6 % (ref 24–44)
MCH RBC QN AUTO: 28.7 PG (ref 27–31)
MCHC RBC AUTO-ENTMCNC: 32.8 % (ref 32–36)
MCV RBC AUTO: 87.5 FL (ref 78–100)
MONOCYTES ABSOLUTE: 0.6 K/CU MM
MONOCYTES RELATIVE PERCENT: 8.1 % (ref 0–4)
PCT TRANSFERRIN: 41 % (ref 10–44)
PDW BLD-RTO: 13 % (ref 11.7–14.9)
PLATELET # BLD: 238 K/CU MM (ref 140–440)
PMV BLD AUTO: 11 FL (ref 7.5–11.1)
POTASSIUM SERPL-SCNC: 4.3 MMOL/L (ref 3.5–5.1)
RBC # BLD: 4.49 M/CU MM (ref 4.2–5.4)
RETICULOCYTE COUNT PCT: 1.2 % (ref 0.2–2.2)
SEGMENTED NEUTROPHILS ABSOLUTE COUNT: 4.2 K/CU MM
SEGMENTED NEUTROPHILS RELATIVE PERCENT: 52.3 % (ref 36–66)
SODIUM BLD-SCNC: 141 MMOL/L (ref 135–145)
TOTAL IRON BINDING CAPACITY: 277 UG/DL (ref 250–450)
TOTAL PROTEIN: 6.8 GM/DL (ref 6.4–8.2)
TSH HIGH SENSITIVITY: 0.2 UIU/ML (ref 0.27–4.2)
UNSATURATED IRON BINDING CAPACITY: 164 UG/DL (ref 110–370)
VITAMIN B-12: 240.9 PG/ML (ref 211–911)
WBC # BLD: 8 K/CU MM (ref 4–10.5)

## 2022-10-18 PROCEDURE — 85045 AUTOMATED RETICULOCYTE COUNT: CPT

## 2022-10-18 PROCEDURE — 83550 IRON BINDING TEST: CPT

## 2022-10-18 PROCEDURE — 36415 COLL VENOUS BLD VENIPUNCTURE: CPT

## 2022-10-18 PROCEDURE — 82728 ASSAY OF FERRITIN: CPT

## 2022-10-18 PROCEDURE — 85025 COMPLETE CBC W/AUTO DIFF WBC: CPT

## 2022-10-18 PROCEDURE — 84443 ASSAY THYROID STIM HORMONE: CPT

## 2022-10-18 PROCEDURE — 85652 RBC SED RATE AUTOMATED: CPT

## 2022-10-18 PROCEDURE — 82607 VITAMIN B-12: CPT

## 2022-10-18 PROCEDURE — 80053 COMPREHEN METABOLIC PANEL: CPT

## 2022-10-18 PROCEDURE — 82746 ASSAY OF FOLIC ACID SERUM: CPT

## 2022-10-18 PROCEDURE — 83615 LACTATE (LD) (LDH) ENZYME: CPT

## 2022-10-18 PROCEDURE — 83540 ASSAY OF IRON: CPT

## 2022-11-16 ENCOUNTER — HOSPITAL ENCOUNTER (OUTPATIENT)
Dept: INFUSION THERAPY | Age: 37
Discharge: HOME | End: 2022-11-16
Payer: COMMERCIAL

## 2022-11-16 ENCOUNTER — OFFICE VISIT (OUTPATIENT)
Dept: ONCOLOGY | Age: 37
End: 2022-11-16
Payer: COMMERCIAL

## 2022-11-16 VITALS
BODY MASS INDEX: 22.43 KG/M2 | HEIGHT: 61 IN | TEMPERATURE: 97.7 F | OXYGEN SATURATION: 99 % | DIASTOLIC BLOOD PRESSURE: 58 MMHG | SYSTOLIC BLOOD PRESSURE: 127 MMHG | HEART RATE: 64 BPM | WEIGHT: 118.8 LBS

## 2022-11-16 DIAGNOSIS — D50.0 IRON DEFICIENCY ANEMIA SECONDARY TO BLOOD LOSS (CHRONIC): Primary | ICD-10-CM

## 2022-11-16 PROCEDURE — 99213 OFFICE O/P EST LOW 20 MIN: CPT | Performed by: INTERNAL MEDICINE

## 2022-11-16 PROCEDURE — G8484 FLU IMMUNIZE NO ADMIN: HCPCS | Performed by: INTERNAL MEDICINE

## 2022-11-16 PROCEDURE — 99211 OFF/OP EST MAY X REQ PHY/QHP: CPT

## 2022-11-16 PROCEDURE — 1036F TOBACCO NON-USER: CPT | Performed by: INTERNAL MEDICINE

## 2022-11-16 PROCEDURE — G8420 CALC BMI NORM PARAMETERS: HCPCS | Performed by: INTERNAL MEDICINE

## 2022-11-16 PROCEDURE — G8427 DOCREV CUR MEDS BY ELIG CLIN: HCPCS | Performed by: INTERNAL MEDICINE

## 2022-11-16 ASSESSMENT — PATIENT HEALTH QUESTIONNAIRE - PHQ9
SUM OF ALL RESPONSES TO PHQ QUESTIONS 1-9: 15
1. LITTLE INTEREST OR PLEASURE IN DOING THINGS: 3
7. TROUBLE CONCENTRATING ON THINGS, SUCH AS READING THE NEWSPAPER OR WATCHING TELEVISION: 0
2. FEELING DOWN, DEPRESSED OR HOPELESS: 3
4. FEELING TIRED OR HAVING LITTLE ENERGY: 3
SUM OF ALL RESPONSES TO PHQ QUESTIONS 1-9: 15
5. POOR APPETITE OR OVEREATING: 3
8. MOVING OR SPEAKING SO SLOWLY THAT OTHER PEOPLE COULD HAVE NOTICED. OR THE OPPOSITE, BEING SO FIGETY OR RESTLESS THAT YOU HAVE BEEN MOVING AROUND A LOT MORE THAN USUAL: 0
9. THOUGHTS THAT YOU WOULD BE BETTER OFF DEAD, OR OF HURTING YOURSELF: 0
SUM OF ALL RESPONSES TO PHQ QUESTIONS 1-9: 15
SUM OF ALL RESPONSES TO PHQ QUESTIONS 1-9: 15
10. IF YOU CHECKED OFF ANY PROBLEMS, HOW DIFFICULT HAVE THESE PROBLEMS MADE IT FOR YOU TO DO YOUR WORK, TAKE CARE OF THINGS AT HOME, OR GET ALONG WITH OTHER PEOPLE: 1
6. FEELING BAD ABOUT YOURSELF - OR THAT YOU ARE A FAILURE OR HAVE LET YOURSELF OR YOUR FAMILY DOWN: 0
3. TROUBLE FALLING OR STAYING ASLEEP: 3
SUM OF ALL RESPONSES TO PHQ9 QUESTIONS 1 & 2: 6

## 2022-11-16 NOTE — PROGRESS NOTES
MA Rooming Questions  Patient: Susy Steele  MRN: [id number]    Date: 11/16/2022        1. Do you have any new issues? yes - Has been having more Fatigue that is more than usual x 1-2 months. 2. Do you need any refills on medications?    no    3. Have you had any imaging done since your last visit?   no    4. Have you been hospitalized or seen in the emergency room since your last visit here?   no    5. Did the patient have a depression screening completed today?  Yes    PHQ-9 Total Score: 15 (11/16/2022 11:15 AM)  Thoughts that you would be better off dead, or of hurting yourself in some way: 0 (11/16/2022 11:15 AM)       PHQ-9 Given to (if applicable):               PHQ-9 Score (if applicable):                     [x] Positive     []  Negative              Does question #9 need addressed (if applicable)                     [] Yes    [x]  No               Sybil Waddell CMA

## 2022-11-16 NOTE — PROGRESS NOTES
Patient Name: Susy Steele  Patient : 1985  Patient MRN: [id number]     Primary Oncologist: Denis Taylor MD  Referring Provider: ADOLFO Gutierrez NP     Date of Service: 2022      Chief Complaint:   Chief Complaint   Patient presents with    Follow-up     Patient Active Problem List:     Iron deficiency anemia secondary to blood loss (chronic)     Lymphadenopathy     Malignant neoplasm of thyroid gland     HPI:   Ms. Carla Seth is a very pleasant 69-year-old patient with medical history significant for papillary thyroid carcinoma diagnosed on October 3, 2016, status post total thyroidectomy on 2016, initially referred to me on 2017, for evaluation of cervical lymphadenopathy. She stated that she had soft tissue neck ultrasound on 2016, for swollen neck glands. She was found to have solid, 2.8 cm inferior right thyroid lobe nodule with microcalcifications. Radiologist strongly recommends fine-needle aspiration. She underwent fine-needle aspiration on 2016, and the final pathology was consistent with papillary carcinoma. She was subsequently evaluated by Dr. Chris Jean-Baptiste and she underwent total thyroidectomy on 2016. Final pathology revealed 2.5 cm encapsulated papillary carcinoma. It was low-grade tumor and margins were negative. There was no tumor capsular invasion, no lymphvascular invasion, no perineural invasion, and there was no extra thyroid extension. No lymph nodes were submitted for evaluation (pT2 pNx pMx). She was started with Levothyroxine since then and she has been followed closely by primary care physician. She started noticing palpable lymphadenopathy in the bilateral neck and inguinal area.   She underwent soft tissue neck sonogram on 2016, and she was found to have prominent bilateral cervical lymph nodes, likely benign given the short axis diameter is less than 1 cm (there is a lymph node measuring 2.7 into 0.6 into 1.2 cm in the right side of the neck, there is additional lymph node along the right side of the neck measuring 1.9 into 1.9 into 0.5 cm). (There is a lymph node measuring 1.9 into 0.4 into 0.8 cm along the left side of the neck inferiorly. Along the posterior aspect of the left side of the neck, there is a lymph node with fatty hilar measuring 2.2 into 0.7 into 2 cm). She stated that her lymph node resolved spontaneously after that and it returned again one to two weeks ago. She has some enlarged and tender lymphadenopathy in the left side of her neck especially in the posterior triangle, right side of the neck and supraclavicular area. Because of findings on the physical examination, she was subsequently referred to me for further evaluation. She is currently on Levothyroxine 100 mcg and her last TSH level was 1.23 on the blood tests done on February 8, 2017. She never had radioactive iodine therapy. Family history is significant for thyroid cancer in one of her aunts. Ms. Amaya Newton had repeat sonogram of the soft tissue neck on March 14, 2017, and it showed no significant change in bilateral lymph nodes measuring less than 1 cm in thickness. The lymph nodes are not pathologically enlarged by imaging criteria. She also had pelvic sonogram on March 14, 2017, and it showed bilateral normal appearing inguinal lymph nodes which correlate to the palpable area of interest, not significantly changed since December 22, 2016. Findings are likely benign and recommend followup, if clinically indicated. Laboratory workups done on March 6, 2017, showed normal complete blood count and normal complete metabolic panel. Her thyroglobulin was 0.3, thyroglobulin antibody - less than 0.9, and TSH was 0.849. She was subsequently evaluated by Dr. Pastora Hoover and she had thyroid scan which was inconclusive.  She is also following regularly with endocrinologist at [facility] [employer]. Her endocrinologist does not recommend her to have radioiodine therapy and she has been followed closely since then. She has laboratory workups on 2018 for her my anemia and thrombocytopenia. All the workups were normal except she was found to have severe iron deficiency. BCR/ABL analysis was sent and she is negative for BCR/ABL translocations. I recognize that she has significant iron deficiency on recent blood test done on 2021. Since she has significant iron deficiency and she could not tolerate oral iron supplementation, we gave injectafer infusion on 4/15,2021. On 2022, she presented to me for follow up. I have been following her for iron deficiency anemia, relative eosinophilia and history of papillary thyroid cancer. I believe her iron deficiency anemia is due to menorrhagia. She stated that she stopped taking ferrous sulfate since it causes her severe constipation. She received iron infusion on April 15, 2021 [total 1500 mg] and she does not require any more iron infusions since then. She stated that her general sense of wellbeing is getting much improved with iron infusion. I recognize that she has normal iron status on 10/18/22 blood test and she does not need any iron infusion at this moment. I recommend her to have repeat iron panel in 6 months. She stated that she changed her birth control pills and she started having menorrhagia since then. Constipation - she is on senna. Recommend her to add colace. She does not have any other significant symptoms at today's visit. PAST MEDICAL HISTORY:  Past medical history significant for:  1. Papillary thyroid cancer. 2.      Migraine headaches. PAST SURGICAL HISTORY:  Past surgical history significant for:  1. Tubal ligation. 2.      Uterine suspension. 3.      Two  sections. 4.      Total thyroidectomy.      FAMILY HISTORY:  Significant for cervical cancer in her great grandmother and thyroid cancer in one of her aunts. No other family history of cancer disease. SOCIAL HISTORY:  She is a current smoker and she smokes about ten cigarettes a day for more than ten years. She denies alcohol drinking and illicit drug abuse. She was  before and she  in 2013. ALLERGIES:  She claims to have allergies to latex. Review of Systems: \"Per interval history; otherwise 10 point ROS is negative. \"  Her energy level is fair and her sleep is fine. She doesn't have fever, chills, night sweats, cough, shortness of breath, chest pain, hemoptysis or palpitations. Her bowels and bladder functions are normal. She denies nausea, vomiting, abdominal pain, diarrhea, constipation, dysuria, loss of appetite or  weight loss. She doesn't have neuropathy and she denies bleeding or clotting issues. She denies any pain in her body. No anxiety or depression today. The rest of the systems are unremarkable.       Vital Signs:  BP (!) 127/58 (Site: Right Upper Arm, Position: Sitting, Cuff Size: Medium Adult)   Pulse 64   Temp 97.7 °F (36.5 °C) (Infrared)   Ht 5' 1\" (1.549 m)   Wt 118 lb 12.8 oz (53.9 kg)   SpO2 99%   BMI 22.45 kg/m²     Physical Exam:  CONSTITUTIONAL: awake, alert, cooperative, no apparent distress   EYES: pupils equal, round and reactive to light, sclera clear and conjunctiva normal  ENT: Normocephalic, without obvious abnormality, atraumatic  NECK: supple, symmetrical, no jugular venous distension and no carotid bruits   HEMATOLOGIC/LYMPHATIC: no cervical, supraclavicular or axillary lymphadenopathy   LUNGS: VBS, no wheezes, no rhonchi, no increased work of breathing, clear to auscultation, no crackles,   CARDIOVASCULAR: regular rate and rhythm, normal S1 and S2, no murmur noted  ABDOMEN: normal bowel sounds x 4, soft, non-distended, non-tender, no masses palpated, no hepatosplenomegaly   MUSCULOSKELETAL: full range of motion noted, tone is normal  NEUROLOGIC: awake, alert, oriented to name, place and time. Motor skills grossly intact. SKIN: Normal skin color, texture, turgor and no jaundice.  appears intact   EXTREMITIES: no cyanosis, no clubbing, no LE edema, no leg swelling,     Labs:  Hematology:  Lab Results   Component Value Date    WBC 8.0 10/18/2022    RBC 4.49 10/18/2022    HGB 12.9 10/18/2022    HCT 39.3 10/18/2022    MCV 87.5 10/18/2022    MCH 28.7 10/18/2022    MCHC 32.8 10/18/2022    RDW 13.0 10/18/2022     10/18/2022    MPV 11.0 10/18/2022    SEGSPCT 52.3 10/18/2022    EOSRELPCT 3.5 (H) 10/18/2022    BASOPCT 0.5 10/18/2022    LYMPHOPCT 35.6 10/18/2022    MONOPCT 8.1 (H) 10/18/2022    SEGSABS 4.2 10/18/2022    EOSABS 0.3 10/18/2022    BASOSABS 0.0 10/18/2022    LYMPHSABS 2.8 10/18/2022    MONOSABS 0.6 10/18/2022    DIFFTYPE AUTOMATED DIFFERENTIAL 10/18/2022    POLYCHROM 1+ 04/02/2018    PLTM PLATELETS APPEAR NORMAL 04/02/2018     Lab Results   Component Value Date    ESR 3 10/18/2022     Chemistry:  Lab Results   Component Value Date     10/18/2022    K 4.3 10/18/2022     10/18/2022    CO2 28 10/18/2022    BUN 14 10/18/2022    CREATININE 0.6 10/18/2022    GLUCOSE 90 10/18/2022    CALCIUM 9.0 10/18/2022    PROT 6.8 10/18/2022    LABALBU 4.3 10/18/2022    BILITOT 0.2 10/18/2022    ALKPHOS 58 10/18/2022    AST 13 (L) 10/18/2022    ALT 9 (L) 10/18/2022    LABGLOM >60 10/18/2022    GFRAA >60 04/06/2022     Lab Results   Component Value Date     10/18/2022     No components found for: LD  Lab Results   Component Value Date    TSHHS 0.205 (L) 10/18/2022    T4FREE 1.51 09/30/2022    FT3 3.3 07/13/2020     Immunology:  Lab Results   Component Value Date    PROT 6.8 10/18/2022     No results found for: Michelle Hernandez, KLFZAINABR  No results found for: B2M  Coagulation Panel:  Lab Results   Component Value Date    PROTIME 11.2 09/29/2016    INR 0.98 09/29/2016    APTT 23.2 09/29/2016    DDIMER <200 07/14/2018     Anemia Panel:  Lab Results   Component Value Date    FVKLFRPX27 240.9 10/18/2022    FOLATE >20.0 (H) 10/18/2022     Tumor Markers:  No results found for: , CEA, , LABCA2, PSA  Observations:  PHQ-9 Total Score: 15 (11/16/2022 11:15 AM)  Thoughts that you would be better off dead, or of hurting yourself in some way: 0 (11/16/2022 11:15 AM)     Assessment:   1. Cervical and inguinal lymphadenopathy. 2.      Papillary thyroid carcinoma. 3.      Normocytic normochromic anemia    Plan:  Ms. Troy Funes has been followed for iron deficiency anemia, relative eosinophilia and history of papillary thyroid cancer. I recognize that she has significant iron deficiency on recent blood test done on March 18, 2021. Since she has significant iron deficiency and she could not tolerate oral iron supplementation, we gave injectafer infusion on 4/15,2021. On November 16, 2022, she presented to me for follow up. I have been following her for iron deficiency anemia, relative eosinophilia and history of papillary thyroid cancer. I believe her iron deficiency anemia is due to menorrhagia. She stated that she stopped taking ferrous sulfate since it causes her severe constipation. She received iron infusion on April 15, 2021 [total 1500 mg] and she does not require any more iron infusions since then. She stated that her general sense of wellbeing is getting much improved with iron infusion. I recognize that she has normal iron status on 10/18/22 blood test and she does not need any iron infusion at this moment. I recommend her to have repeat iron panel in 6 months. She stated that she changed her birth control pills and she started having menorrhagia since then. Constipation - she is on senna. Recommend her to add colace. I answered all her questions and concerns for today. Recent imaging and labs were reviewed and discussed with the patient.

## 2022-11-25 ENCOUNTER — HOSPITAL ENCOUNTER (OUTPATIENT)
Age: 37
Discharge: HOME | End: 2022-11-25
Payer: COMMERCIAL

## 2022-11-25 LAB
T4 FREE: 1.29 NG/DL (ref 0.9–1.8)
TSH HIGH SENSITIVITY: 0.7 UIU/ML (ref 0.27–4.2)

## 2022-11-25 PROCEDURE — 36415 COLL VENOUS BLD VENIPUNCTURE: CPT

## 2022-11-25 PROCEDURE — 84480 ASSAY TRIIODOTHYRONINE (T3): CPT

## 2022-11-25 PROCEDURE — 84439 ASSAY OF FREE THYROXINE: CPT

## 2022-11-25 PROCEDURE — 84443 ASSAY THYROID STIM HORMONE: CPT

## 2022-11-26 LAB — T3 TOTAL: 132 NG/DL (ref 80–200)

## 2022-12-30 ENCOUNTER — HOSPITAL ENCOUNTER (OUTPATIENT)
Age: 37
Discharge: HOME | End: 2022-12-30
Payer: COMMERCIAL

## 2022-12-30 LAB
T4 FREE: 1.43 NG/DL (ref 0.9–1.8)
TSH HIGH SENSITIVITY: 1.3 UIU/ML (ref 0.27–4.2)

## 2022-12-30 PROCEDURE — 36415 COLL VENOUS BLD VENIPUNCTURE: CPT

## 2022-12-30 PROCEDURE — 84443 ASSAY THYROID STIM HORMONE: CPT

## 2022-12-30 PROCEDURE — 84439 ASSAY OF FREE THYROXINE: CPT

## 2023-05-11 ENCOUNTER — HOSPITAL ENCOUNTER (OUTPATIENT)
Dept: INFUSION THERAPY | Age: 38
Discharge: HOME | End: 2023-05-11
Payer: COMMERCIAL

## 2023-05-11 DIAGNOSIS — D50.0 IRON DEFICIENCY ANEMIA SECONDARY TO BLOOD LOSS (CHRONIC): ICD-10-CM

## 2023-05-11 LAB
ALBUMIN SERPL-MCNC: 4.6 GM/DL (ref 3.4–5)
ALP BLD-CCNC: 46 IU/L (ref 40–129)
ALT SERPL-CCNC: 10 U/L (ref 10–40)
ANION GAP SERPL CALCULATED.3IONS-SCNC: 10 MMOL/L (ref 4–16)
AST SERPL-CCNC: 16 IU/L (ref 15–37)
BASOPHILS ABSOLUTE: 0 K/CU MM
BASOPHILS RELATIVE PERCENT: 0.4 % (ref 0–1)
BILIRUB SERPL-MCNC: 0.3 MG/DL (ref 0–1)
BUN SERPL-MCNC: 12 MG/DL (ref 6–23)
CALCIUM SERPL-MCNC: 9 MG/DL (ref 8.3–10.6)
CHLORIDE BLD-SCNC: 103 MMOL/L (ref 99–110)
CO2: 25 MMOL/L (ref 21–32)
CREAT SERPL-MCNC: 0.6 MG/DL (ref 0.6–1.1)
DIFFERENTIAL TYPE: ABNORMAL
EOSINOPHILS ABSOLUTE: 0.2 K/CU MM
EOSINOPHILS RELATIVE PERCENT: 1.6 % (ref 0–3)
FERRITIN: 39 NG/ML (ref 15–150)
FOLATE SERPL-MCNC: 19.3 NG/ML (ref 3.1–17.5)
GFR SERPL CREATININE-BSD FRML MDRD: >60 ML/MIN/1.73M2
GLUCOSE SERPL-MCNC: 79 MG/DL (ref 70–99)
HCT VFR BLD CALC: 38.1 % (ref 37–47)
HEMOGLOBIN: 12.6 GM/DL (ref 12.5–16)
IRON: 121 UG/DL (ref 37–145)
LACTATE DEHYDROGENASE: 166 IU/L (ref 120–246)
LYMPHOCYTES ABSOLUTE: 2.3 K/CU MM
LYMPHOCYTES RELATIVE PERCENT: 24.5 % (ref 24–44)
MCH RBC QN AUTO: 28.7 PG (ref 27–31)
MCHC RBC AUTO-ENTMCNC: 33.1 % (ref 32–36)
MCV RBC AUTO: 86.8 FL (ref 78–100)
MONOCYTES ABSOLUTE: 0.7 K/CU MM
MONOCYTES RELATIVE PERCENT: 7.7 % (ref 0–4)
PCT TRANSFERRIN: 35 % (ref 10–44)
PDW BLD-RTO: 13.5 % (ref 11.7–14.9)
PLATELET # BLD: 248 K/CU MM (ref 140–440)
PMV BLD AUTO: 11 FL (ref 7.5–11.1)
POTASSIUM SERPL-SCNC: 4.4 MMOL/L (ref 3.5–5.1)
RBC # BLD: 4.39 M/CU MM (ref 4.2–5.4)
RETICULOCYTE COUNT PCT: 1.4 % (ref 0.2–2.2)
SEGMENTED NEUTROPHILS ABSOLUTE COUNT: 6.3 K/CU MM
SEGMENTED NEUTROPHILS RELATIVE PERCENT: 65.8 % (ref 36–66)
SODIUM BLD-SCNC: 138 MMOL/L (ref 135–145)
TOTAL IRON BINDING CAPACITY: 345 UG/DL (ref 250–450)
TOTAL PROTEIN: 7 GM/DL (ref 6.4–8.2)
UNSATURATED IRON BINDING CAPACITY: 224 UG/DL (ref 110–370)
VITAMIN B-12: 329.2 PG/ML (ref 211–911)
WBC # BLD: 9.6 K/CU MM (ref 4–10.5)

## 2023-05-11 PROCEDURE — 83615 LACTATE (LD) (LDH) ENZYME: CPT

## 2023-05-11 PROCEDURE — 83540 ASSAY OF IRON: CPT

## 2023-05-11 PROCEDURE — 36415 COLL VENOUS BLD VENIPUNCTURE: CPT

## 2023-05-11 PROCEDURE — 82728 ASSAY OF FERRITIN: CPT

## 2023-05-11 PROCEDURE — 83550 IRON BINDING TEST: CPT

## 2023-05-11 PROCEDURE — 85025 COMPLETE CBC W/AUTO DIFF WBC: CPT

## 2023-05-11 PROCEDURE — 80053 COMPREHEN METABOLIC PANEL: CPT

## 2023-05-11 PROCEDURE — 82746 ASSAY OF FOLIC ACID SERUM: CPT

## 2023-05-11 PROCEDURE — 85045 AUTOMATED RETICULOCYTE COUNT: CPT

## 2023-05-11 PROCEDURE — 82607 VITAMIN B-12: CPT

## 2023-05-18 ENCOUNTER — OFFICE VISIT (OUTPATIENT)
Dept: ONCOLOGY | Age: 38
End: 2023-05-18
Payer: COMMERCIAL

## 2023-05-18 ENCOUNTER — HOSPITAL ENCOUNTER (OUTPATIENT)
Dept: INFUSION THERAPY | Age: 38
Discharge: HOME | End: 2023-05-18
Payer: COMMERCIAL

## 2023-05-18 VITALS
OXYGEN SATURATION: 100 % | BODY MASS INDEX: 22.84 KG/M2 | DIASTOLIC BLOOD PRESSURE: 58 MMHG | TEMPERATURE: 98 F | SYSTOLIC BLOOD PRESSURE: 127 MMHG | HEIGHT: 61 IN | HEART RATE: 65 BPM | WEIGHT: 121 LBS | RESPIRATION RATE: 16 BRPM

## 2023-05-18 DIAGNOSIS — D50.0 IRON DEFICIENCY ANEMIA SECONDARY TO BLOOD LOSS (CHRONIC): Primary | ICD-10-CM

## 2023-05-18 DIAGNOSIS — K90.9 INTESTINAL MALABSORPTION, UNSPECIFIED TYPE: ICD-10-CM

## 2023-05-18 PROCEDURE — G8427 DOCREV CUR MEDS BY ELIG CLIN: HCPCS | Performed by: INTERNAL MEDICINE

## 2023-05-18 PROCEDURE — 1036F TOBACCO NON-USER: CPT | Performed by: INTERNAL MEDICINE

## 2023-05-18 PROCEDURE — 99211 OFF/OP EST MAY X REQ PHY/QHP: CPT

## 2023-05-18 PROCEDURE — 99213 OFFICE O/P EST LOW 20 MIN: CPT | Performed by: INTERNAL MEDICINE

## 2023-05-18 PROCEDURE — G8420 CALC BMI NORM PARAMETERS: HCPCS | Performed by: INTERNAL MEDICINE

## 2023-05-18 RX ORDER — EPINEPHRINE 1 MG/ML
0.3 INJECTION, SOLUTION, CONCENTRATE INTRAVENOUS PRN
OUTPATIENT
Start: 2023-05-18

## 2023-05-18 RX ORDER — SODIUM CHLORIDE 9 MG/ML
5-250 INJECTION, SOLUTION INTRAVENOUS PRN
OUTPATIENT
Start: 2023-05-18

## 2023-05-18 RX ORDER — ALBUTEROL SULFATE 90 UG/1
4 AEROSOL, METERED RESPIRATORY (INHALATION) PRN
OUTPATIENT
Start: 2023-05-18

## 2023-05-18 RX ORDER — HEPARIN SODIUM (PORCINE) LOCK FLUSH IV SOLN 100 UNIT/ML 100 UNIT/ML
500 SOLUTION INTRAVENOUS PRN
OUTPATIENT
Start: 2023-05-18

## 2023-05-18 RX ORDER — DIPHENHYDRAMINE HYDROCHLORIDE 50 MG/ML
50 INJECTION INTRAMUSCULAR; INTRAVENOUS
OUTPATIENT
Start: 2023-05-18

## 2023-05-18 RX ORDER — SODIUM CHLORIDE 9 MG/ML
INJECTION, SOLUTION INTRAVENOUS CONTINUOUS
OUTPATIENT
Start: 2023-05-18

## 2023-05-18 RX ORDER — ACETAMINOPHEN 325 MG/1
650 TABLET ORAL
OUTPATIENT
Start: 2023-05-18

## 2023-05-18 RX ORDER — FAMOTIDINE 10 MG/ML
20 INJECTION, SOLUTION INTRAVENOUS
OUTPATIENT
Start: 2023-05-18

## 2023-05-18 RX ORDER — SODIUM CHLORIDE 0.9 % (FLUSH) 0.9 %
5-40 SYRINGE (ML) INJECTION PRN
OUTPATIENT
Start: 2023-05-18

## 2023-05-18 RX ORDER — ONDANSETRON 2 MG/ML
8 INJECTION INTRAMUSCULAR; INTRAVENOUS
OUTPATIENT
Start: 2023-05-18

## 2023-05-18 ASSESSMENT — PATIENT HEALTH QUESTIONNAIRE - PHQ9
SUM OF ALL RESPONSES TO PHQ QUESTIONS 1-9: 0
1. LITTLE INTEREST OR PLEASURE IN DOING THINGS: 0
SUM OF ALL RESPONSES TO PHQ9 QUESTIONS 1 & 2: 0
SUM OF ALL RESPONSES TO PHQ QUESTIONS 1-9: 0
2. FEELING DOWN, DEPRESSED OR HOPELESS: 0

## 2023-05-18 NOTE — PROGRESS NOTES
Patient needs iron infusion for iron deficiency anemia d/t chronic blood loss. Order for [facility] and  referral placed. Therapy plan added. Called patient at [phone] to notify. Lab results from  reviewed. Explained that once medication approved by insurance, patient will be scheduled for OP infusion and notified of appointment time. Patient voices understanding. No further needs addressed at this time.

## 2023-05-18 NOTE — PROGRESS NOTES
MA Rooming Questions  Patient: India Post  MRN: [id number]    Date: 5/18/2023        1. Do you have any new issues?   no         2. Do you need any refills on medications?    no    3. Have you had any imaging done since your last visit?   no    4. Have you been hospitalized or seen in the emergency room since your last visit here?   no    5. Did the patient have a depression screening completed today?  Yes    PHQ-9 Total Score: 0 (5/18/2023 10:20 AM)       PHQ-9 Given to (if applicable):               PHQ-9 Score (if applicable):                     [] Positive     []  Negative              Does question #9 need addressed (if applicable)                     [] Yes    []  No               Erica Contreras CMA

## 2023-05-19 ENCOUNTER — TELEPHONE (OUTPATIENT)
Dept: INFUSION THERAPY | Age: 38
End: 2023-05-19

## 2023-05-19 NOTE — TELEPHONE ENCOUNTER
Called patient to schedule iron infusion, patient is scheduled for appt. Pt not currently taking oral iron. Advised pt to notify us if they are unable to keep their scheduled appt time.

## 2023-06-19 ENCOUNTER — HOSPITAL ENCOUNTER (OUTPATIENT)
Dept: INFUSION THERAPY | Age: 38
Discharge: HOME | End: 2023-06-19
Payer: COMMERCIAL

## 2023-06-19 VITALS
DIASTOLIC BLOOD PRESSURE: 57 MMHG | TEMPERATURE: 97.9 F | OXYGEN SATURATION: 100 % | BODY MASS INDEX: 23.26 KG/M2 | HEIGHT: 61 IN | SYSTOLIC BLOOD PRESSURE: 113 MMHG | WEIGHT: 123.2 LBS | HEART RATE: 56 BPM

## 2023-06-19 DIAGNOSIS — K90.9 INTESTINAL MALABSORPTION, UNSPECIFIED TYPE: Primary | ICD-10-CM

## 2023-06-19 DIAGNOSIS — D50.0 IRON DEFICIENCY ANEMIA SECONDARY TO BLOOD LOSS (CHRONIC): ICD-10-CM

## 2023-06-19 PROCEDURE — 2580000003 HC RX 258: Performed by: INTERNAL MEDICINE

## 2023-06-19 PROCEDURE — 6360000002 HC RX W HCPCS: Performed by: INTERNAL MEDICINE

## 2023-06-19 PROCEDURE — 96365 THER/PROPH/DIAG IV INF INIT: CPT

## 2023-06-19 RX ORDER — EPINEPHRINE 1 MG/ML
0.3 INJECTION, SOLUTION, CONCENTRATE INTRAVENOUS PRN
Status: CANCELLED | OUTPATIENT
Start: 2023-06-26

## 2023-06-19 RX ORDER — ACETAMINOPHEN 325 MG/1
650 TABLET ORAL
Status: CANCELLED | OUTPATIENT
Start: 2023-06-26

## 2023-06-19 RX ORDER — ALBUTEROL SULFATE 90 UG/1
4 AEROSOL, METERED RESPIRATORY (INHALATION) PRN
Status: CANCELLED | OUTPATIENT
Start: 2023-06-26

## 2023-06-19 RX ORDER — HEPARIN SODIUM 100 [USP'U]/ML
500 INJECTION, SOLUTION INTRAVENOUS PRN
Status: CANCELLED | OUTPATIENT
Start: 2023-06-26

## 2023-06-19 RX ORDER — SODIUM CHLORIDE 9 MG/ML
INJECTION, SOLUTION INTRAVENOUS CONTINUOUS
Status: CANCELLED | OUTPATIENT
Start: 2023-06-26

## 2023-06-19 RX ORDER — SODIUM CHLORIDE 9 MG/ML
5-250 INJECTION, SOLUTION INTRAVENOUS PRN
Status: CANCELLED | OUTPATIENT
Start: 2023-06-26

## 2023-06-19 RX ORDER — ONDANSETRON 2 MG/ML
8 INJECTION INTRAMUSCULAR; INTRAVENOUS
Status: CANCELLED | OUTPATIENT
Start: 2023-06-26

## 2023-06-19 RX ORDER — DIPHENHYDRAMINE HYDROCHLORIDE 50 MG/ML
50 INJECTION INTRAMUSCULAR; INTRAVENOUS
Status: CANCELLED | OUTPATIENT
Start: 2023-06-26

## 2023-06-19 RX ORDER — SODIUM CHLORIDE 0.9 % (FLUSH) 0.9 %
5-40 SYRINGE (ML) INJECTION PRN
Status: CANCELLED | OUTPATIENT
Start: 2023-06-26

## 2023-06-19 RX ORDER — SODIUM CHLORIDE 9 MG/ML
5-250 INJECTION, SOLUTION INTRAVENOUS PRN
Status: DISCONTINUED | OUTPATIENT
Start: 2023-06-19 | End: 2023-06-20 | Stop reason: HOSPADM

## 2023-06-19 RX ORDER — FAMOTIDINE 10 MG/ML
20 INJECTION, SOLUTION INTRAVENOUS
Status: CANCELLED | OUTPATIENT
Start: 2023-06-26

## 2023-06-19 RX ADMIN — FERRIC CARBOXYMALTOSE INJECTION 750 MG: 50 INJECTION, SOLUTION INTRAVENOUS at 14:59

## 2023-06-19 RX ADMIN — SODIUM CHLORIDE 20 ML/HR: 9 INJECTION, SOLUTION INTRAVENOUS at 14:59

## 2023-06-19 NOTE — PROGRESS NOTES
Pt ambulated into infusion mitch for Injectafer infusion. Pt denies any concerns for the provider at this time. PIV started in LT Hand. Good blood return noted. Treatment authorized and administered as ordered. Pt  tolerated treatment well. 30 min post infusion observation completed. Pt left treatment area ambulatory and in stable condition. Copy of AVS provided.

## 2023-06-26 ENCOUNTER — HOSPITAL ENCOUNTER (OUTPATIENT)
Dept: INFUSION THERAPY | Age: 38
Discharge: HOME | End: 2023-06-26
Payer: COMMERCIAL

## 2023-06-26 VITALS
DIASTOLIC BLOOD PRESSURE: 55 MMHG | TEMPERATURE: 97.9 F | WEIGHT: 124.4 LBS | OXYGEN SATURATION: 100 % | SYSTOLIC BLOOD PRESSURE: 113 MMHG | BODY MASS INDEX: 23.49 KG/M2 | HEART RATE: 56 BPM | HEIGHT: 61 IN

## 2023-06-26 DIAGNOSIS — K90.9 INTESTINAL MALABSORPTION, UNSPECIFIED TYPE: Primary | ICD-10-CM

## 2023-06-26 DIAGNOSIS — D50.0 IRON DEFICIENCY ANEMIA SECONDARY TO BLOOD LOSS (CHRONIC): ICD-10-CM

## 2023-06-26 PROCEDURE — 6360000002 HC RX W HCPCS: Performed by: INTERNAL MEDICINE

## 2023-06-26 PROCEDURE — 96365 THER/PROPH/DIAG IV INF INIT: CPT

## 2023-06-26 PROCEDURE — 2580000003 HC RX 258: Performed by: INTERNAL MEDICINE

## 2023-06-26 RX ORDER — ACETAMINOPHEN 325 MG/1
650 TABLET ORAL
OUTPATIENT
Start: 2023-06-26

## 2023-06-26 RX ORDER — SODIUM CHLORIDE 9 MG/ML
5-250 INJECTION, SOLUTION INTRAVENOUS PRN
OUTPATIENT
Start: 2023-06-26

## 2023-06-26 RX ORDER — SODIUM CHLORIDE 9 MG/ML
5-250 INJECTION, SOLUTION INTRAVENOUS PRN
Status: CANCELLED | OUTPATIENT
Start: 2023-06-26

## 2023-06-26 RX ORDER — SODIUM CHLORIDE 9 MG/ML
5-250 INJECTION, SOLUTION INTRAVENOUS PRN
Status: DISCONTINUED | OUTPATIENT
Start: 2023-06-26 | End: 2023-06-27 | Stop reason: HOSPADM

## 2023-06-26 RX ORDER — DIPHENHYDRAMINE HYDROCHLORIDE 50 MG/ML
50 INJECTION INTRAMUSCULAR; INTRAVENOUS
OUTPATIENT
Start: 2023-06-26

## 2023-06-26 RX ORDER — FAMOTIDINE 10 MG/ML
20 INJECTION, SOLUTION INTRAVENOUS
OUTPATIENT
Start: 2023-06-26

## 2023-06-26 RX ORDER — EPINEPHRINE 1 MG/ML
0.3 INJECTION, SOLUTION, CONCENTRATE INTRAVENOUS PRN
OUTPATIENT
Start: 2023-06-26

## 2023-06-26 RX ORDER — ONDANSETRON 2 MG/ML
8 INJECTION INTRAMUSCULAR; INTRAVENOUS
OUTPATIENT
Start: 2023-06-26

## 2023-06-26 RX ORDER — HEPARIN SODIUM 100 [USP'U]/ML
500 INJECTION, SOLUTION INTRAVENOUS PRN
OUTPATIENT
Start: 2023-06-26

## 2023-06-26 RX ORDER — ALBUTEROL SULFATE 90 UG/1
4 AEROSOL, METERED RESPIRATORY (INHALATION) PRN
OUTPATIENT
Start: 2023-06-26

## 2023-06-26 RX ORDER — SODIUM CHLORIDE 0.9 % (FLUSH) 0.9 %
5-40 SYRINGE (ML) INJECTION PRN
OUTPATIENT
Start: 2023-06-26

## 2023-06-26 RX ORDER — SODIUM CHLORIDE 9 MG/ML
INJECTION, SOLUTION INTRAVENOUS CONTINUOUS
OUTPATIENT
Start: 2023-06-26

## 2023-06-26 RX ADMIN — SODIUM CHLORIDE 20 ML/HR: 9 INJECTION, SOLUTION INTRAVENOUS at 11:35

## 2023-06-26 RX ADMIN — FERRIC CARBOXYMALTOSE INJECTION 750 MG: 50 INJECTION, SOLUTION INTRAVENOUS at 11:35

## 2023-12-15 ENCOUNTER — HOSPITAL ENCOUNTER (OUTPATIENT)
Dept: INFUSION THERAPY | Age: 38
Discharge: HOME | End: 2023-12-15
Payer: COMMERCIAL

## 2023-12-15 DIAGNOSIS — K90.9 INTESTINAL MALABSORPTION, UNSPECIFIED TYPE: ICD-10-CM

## 2023-12-15 DIAGNOSIS — D50.0 IRON DEFICIENCY ANEMIA SECONDARY TO BLOOD LOSS (CHRONIC): ICD-10-CM

## 2023-12-15 LAB
ALBUMIN SERPL-MCNC: 4.3 GM/DL (ref 3.4–5)
ALP BLD-CCNC: 41 IU/L (ref 40–129)
ALT SERPL-CCNC: 14 U/L (ref 10–40)
ANION GAP SERPL CALCULATED.3IONS-SCNC: 10 MMOL/L (ref 4–16)
AST SERPL-CCNC: 14 IU/L (ref 15–37)
BASOPHILS ABSOLUTE: 0 K/CU MM
BASOPHILS RELATIVE PERCENT: 0.3 % (ref 0–1)
BILIRUB SERPL-MCNC: 0.3 MG/DL (ref 0–1)
BUN SERPL-MCNC: 8 MG/DL (ref 6–23)
CALCIUM SERPL-MCNC: 8.6 MG/DL (ref 8.3–10.6)
CHLORIDE BLD-SCNC: 108 MMOL/L (ref 99–110)
CO2: 26 MMOL/L (ref 21–32)
CREAT SERPL-MCNC: 0.5 MG/DL (ref 0.6–1.1)
DIFFERENTIAL TYPE: ABNORMAL
EOSINOPHILS ABSOLUTE: 0.2 K/CU MM
EOSINOPHILS RELATIVE PERCENT: 3 % (ref 0–3)
ERYTHROCYTE SEDIMENTATION RATE: <1 MM/HR (ref 0–20)
FERRITIN: 549 NG/ML (ref 15–150)
FOLATE SERPL-MCNC: 12.9 NG/ML (ref 3.1–17.5)
GFR SERPL CREATININE-BSD FRML MDRD: >60 ML/MIN/1.73M2
GLUCOSE SERPL-MCNC: 104 MG/DL (ref 70–99)
HCT VFR BLD CALC: 36.1 % (ref 37–47)
HEMOGLOBIN: 11.9 GM/DL (ref 12.5–16)
IRON: 115 UG/DL (ref 37–145)
LACTATE DEHYDROGENASE: 137 IU/L (ref 120–246)
LYMPHOCYTES ABSOLUTE: 1.8 K/CU MM
LYMPHOCYTES RELATIVE PERCENT: 24.2 % (ref 24–44)
MCH RBC QN AUTO: 29.2 PG (ref 27–31)
MCHC RBC AUTO-ENTMCNC: 33 % (ref 32–36)
MCV RBC AUTO: 88.7 FL (ref 78–100)
MONOCYTES ABSOLUTE: 0.5 K/CU MM
MONOCYTES RELATIVE PERCENT: 6.5 % (ref 0–4)
PCT TRANSFERRIN: 42 % (ref 10–44)
PDW BLD-RTO: 13.1 % (ref 11.7–14.9)
PLATELET # BLD: 230 K/CU MM (ref 140–440)
PMV BLD AUTO: 11.1 FL (ref 7.5–11.1)
POTASSIUM SERPL-SCNC: 4.1 MMOL/L (ref 3.5–5.1)
RBC # BLD: 4.07 M/CU MM (ref 4.2–5.4)
RETICULOCYTE COUNT PCT: 1.8 % (ref 0.2–2.2)
SEGMENTED NEUTROPHILS ABSOLUTE COUNT: 4.9 K/CU MM
SEGMENTED NEUTROPHILS RELATIVE PERCENT: 66 % (ref 36–66)
SODIUM BLD-SCNC: 144 MMOL/L (ref 135–145)
TOTAL IRON BINDING CAPACITY: 275 UG/DL (ref 250–450)
TOTAL PROTEIN: 6.5 GM/DL (ref 6.4–8.2)
UNSATURATED IRON BINDING CAPACITY: 160 UG/DL (ref 110–370)
VITAMIN B-12: 254.3 PG/ML (ref 211–911)
WBC # BLD: 7.4 K/CU MM (ref 4–10.5)

## 2023-12-15 PROCEDURE — 85025 COMPLETE CBC W/AUTO DIFF WBC: CPT

## 2023-12-15 PROCEDURE — 85652 RBC SED RATE AUTOMATED: CPT

## 2023-12-15 PROCEDURE — 85045 AUTOMATED RETICULOCYTE COUNT: CPT

## 2023-12-15 PROCEDURE — 82746 ASSAY OF FOLIC ACID SERUM: CPT

## 2023-12-15 PROCEDURE — 83615 LACTATE (LD) (LDH) ENZYME: CPT

## 2023-12-15 PROCEDURE — 36415 COLL VENOUS BLD VENIPUNCTURE: CPT

## 2023-12-15 PROCEDURE — 82728 ASSAY OF FERRITIN: CPT

## 2023-12-15 PROCEDURE — 82607 VITAMIN B-12: CPT

## 2023-12-15 PROCEDURE — 83550 IRON BINDING TEST: CPT

## 2023-12-15 PROCEDURE — 80053 COMPREHEN METABOLIC PANEL: CPT

## 2023-12-15 PROCEDURE — 83540 ASSAY OF IRON: CPT

## 2023-12-22 ENCOUNTER — HOSPITAL ENCOUNTER (OUTPATIENT)
Dept: INFUSION THERAPY | Age: 38
Discharge: HOME | End: 2023-12-22

## 2024-01-25 ENCOUNTER — HOSPITAL ENCOUNTER (OUTPATIENT)
Age: 39
Discharge: HOME | End: 2024-01-25
Payer: COMMERCIAL

## 2024-01-25 LAB
T4 FREE SERPL-MCNC: 1.5 NG/DL (ref 0.9–1.8)
TSH SERPL DL<=0.005 MIU/L-ACNC: 0.05 UIU/ML (ref 0.27–4.2)

## 2024-01-25 PROCEDURE — 36415 COLL VENOUS BLD VENIPUNCTURE: CPT

## 2024-01-25 PROCEDURE — 84443 ASSAY THYROID STIM HORMONE: CPT

## 2024-01-25 PROCEDURE — 84439 ASSAY OF FREE THYROXINE: CPT

## 2024-01-31 LAB
THYROGLOB AB SERPL-ACNC: <0.9 IU/ML (ref 0–4)
THYROGLOB SERPL-MCNC: 0.2 NG/ML (ref 1.3–31.8)
THYROGLOB SERPL-MCNC: ABNORMAL NG/ML (ref 1.3–31.8)

## 2024-06-25 ENCOUNTER — HOSPITAL ENCOUNTER (OUTPATIENT)
Dept: INFUSION THERAPY | Age: 39
Discharge: HOME | End: 2024-06-25
Payer: COMMERCIAL

## 2024-06-25 ENCOUNTER — HOSPITAL ENCOUNTER (OUTPATIENT)
Age: 39
Setting detail: SPECIMEN
Discharge: HOME | End: 2024-06-25
Payer: COMMERCIAL

## 2024-06-25 DIAGNOSIS — D50.0 IRON DEFICIENCY ANEMIA SECONDARY TO BLOOD LOSS (CHRONIC): ICD-10-CM

## 2024-06-25 LAB
25(OH)D3 SERPL-MCNC: 54.97 NG/ML
ALBUMIN SERPL-MCNC: 4.1 GM/DL (ref 3.4–5)
ALP BLD-CCNC: 46 IU/L (ref 40–129)
ALT SERPL-CCNC: 9 U/L (ref 10–40)
ANION GAP SERPL CALCULATED.3IONS-SCNC: 10 MMOL/L (ref 7–16)
AST SERPL-CCNC: 11 IU/L (ref 15–37)
BASOPHILS ABSOLUTE: 0 K/CU MM
BASOPHILS RELATIVE PERCENT: 0.4 % (ref 0–1)
BILIRUB SERPL-MCNC: 0.4 MG/DL (ref 0–1)
BUN SERPL-MCNC: 13 MG/DL (ref 6–23)
CALCIUM SERPL-MCNC: 9 MG/DL (ref 8.3–10.6)
CHLORIDE BLD-SCNC: 103 MMOL/L (ref 99–110)
CO2: 24 MMOL/L (ref 21–32)
CREAT SERPL-MCNC: 0.6 MG/DL (ref 0.6–1.1)
DIFFERENTIAL TYPE: ABNORMAL
EOSINOPHILS ABSOLUTE: 0.1 K/CU MM
EOSINOPHILS RELATIVE PERCENT: 1.5 % (ref 0–3)
FERRITIN: 319 NG/ML (ref 15–150)
FOLATE SERPL-MCNC: 9.8 NG/ML (ref 3.1–17.5)
GFR, ESTIMATED: >90 ML/MIN/1.73M2
GLUCOSE SERPL-MCNC: 83 MG/DL (ref 70–99)
HCT VFR BLD CALC: 35.6 % (ref 37–47)
HEMOGLOBIN: 11.8 GM/DL (ref 12.5–16)
IRON: 121 UG/DL (ref 37–145)
LYMPHOCYTES ABSOLUTE: 1.9 K/CU MM
LYMPHOCYTES RELATIVE PERCENT: 24.4 % (ref 24–44)
MCH RBC QN AUTO: 29.1 PG (ref 27–31)
MCHC RBC AUTO-ENTMCNC: 33.1 % (ref 32–36)
MCV RBC AUTO: 87.9 FL (ref 78–100)
MONOCYTES ABSOLUTE: 0.5 K/CU MM
MONOCYTES RELATIVE PERCENT: 6.3 % (ref 0–4)
NEUTROPHILS ABSOLUTE: 5.2 K/CU MM
NEUTROPHILS RELATIVE PERCENT: 67.4 % (ref 36–66)
PCT TRANSFERRIN: 40 % (ref 10–44)
PDW BLD-RTO: 13.1 % (ref 11.7–14.9)
PLATELET # BLD: 251 K/CU MM (ref 140–440)
PMV BLD AUTO: 10.4 FL (ref 7.5–11.1)
POTASSIUM SERPL-SCNC: 4.3 MMOL/L (ref 3.5–5.1)
RBC # BLD: 4.05 M/CU MM (ref 4.2–5.4)
SODIUM BLD-SCNC: 137 MMOL/L (ref 135–145)
T4 FREE SERPL-MCNC: 1.39 NG/DL (ref 0.9–1.8)
TOTAL IRON BINDING CAPACITY: 299 UG/DL (ref 250–450)
TOTAL PROTEIN: 6.7 GM/DL (ref 6.4–8.2)
TSH SERPL DL<=0.005 MIU/L-ACNC: 0.1 UIU/ML (ref 0.27–4.2)
UNSATURATED IRON BINDING CAPACITY: 178 UG/DL (ref 110–370)
VITAMIN B-12: 365.1 PG/ML (ref 211–911)
WBC # BLD: 7.8 K/CU MM (ref 4–10.5)

## 2024-06-25 PROCEDURE — 85025 COMPLETE CBC W/AUTO DIFF WBC: CPT

## 2024-06-25 PROCEDURE — 80053 COMPREHEN METABOLIC PANEL: CPT

## 2024-06-25 PROCEDURE — 83540 ASSAY OF IRON: CPT

## 2024-06-25 PROCEDURE — 82607 VITAMIN B-12: CPT

## 2024-06-25 PROCEDURE — 82306 VITAMIN D 25 HYDROXY: CPT

## 2024-06-25 PROCEDURE — 84439 ASSAY OF FREE THYROXINE: CPT

## 2024-06-25 PROCEDURE — 36415 COLL VENOUS BLD VENIPUNCTURE: CPT

## 2024-06-25 PROCEDURE — 82746 ASSAY OF FOLIC ACID SERUM: CPT

## 2024-06-25 PROCEDURE — 82728 ASSAY OF FERRITIN: CPT

## 2024-06-25 PROCEDURE — 83550 IRON BINDING TEST: CPT

## 2024-06-25 PROCEDURE — 84443 ASSAY THYROID STIM HORMONE: CPT

## 2024-07-02 ENCOUNTER — HOSPITAL ENCOUNTER (OUTPATIENT)
Dept: INFUSION THERAPY | Age: 39
Discharge: HOME | End: 2024-07-02
Payer: COMMERCIAL

## 2024-07-02 ENCOUNTER — OFFICE VISIT (OUTPATIENT)
Dept: ONCOLOGY | Age: 39
End: 2024-07-02
Payer: COMMERCIAL

## 2024-07-02 VITALS
HEIGHT: 61 IN | WEIGHT: 130.2 LBS | RESPIRATION RATE: 16 BRPM | BODY MASS INDEX: 24.58 KG/M2 | TEMPERATURE: 97.9 F | SYSTOLIC BLOOD PRESSURE: 131 MMHG | HEART RATE: 56 BPM | OXYGEN SATURATION: 99 % | DIASTOLIC BLOOD PRESSURE: 59 MMHG

## 2024-07-02 DIAGNOSIS — D50.0 IRON DEFICIENCY ANEMIA SECONDARY TO BLOOD LOSS (CHRONIC): Primary | ICD-10-CM

## 2024-07-02 PROCEDURE — G8420 CALC BMI NORM PARAMETERS: HCPCS | Performed by: INTERNAL MEDICINE

## 2024-07-02 PROCEDURE — 1036F TOBACCO NON-USER: CPT | Performed by: INTERNAL MEDICINE

## 2024-07-02 PROCEDURE — 99211 OFF/OP EST MAY X REQ PHY/QHP: CPT

## 2024-07-02 PROCEDURE — 99213 OFFICE O/P EST LOW 20 MIN: CPT | Performed by: INTERNAL MEDICINE

## 2024-07-02 PROCEDURE — G8427 DOCREV CUR MEDS BY ELIG CLIN: HCPCS | Performed by: INTERNAL MEDICINE

## 2024-07-02 RX ORDER — CYANOCOBALAMIN 1000 UG/ML
1000 INJECTION, SOLUTION INTRAMUSCULAR; SUBCUTANEOUS
Qty: 2 ML | Refills: 5 | Status: SHIPPED | OUTPATIENT
Start: 2024-07-02

## 2024-07-02 ASSESSMENT — PATIENT HEALTH QUESTIONNAIRE - PHQ9
SUM OF ALL RESPONSES TO PHQ QUESTIONS 1-9: 2
2. FEELING DOWN, DEPRESSED OR HOPELESS: SEVERAL DAYS
SUM OF ALL RESPONSES TO PHQ9 QUESTIONS 1 & 2: 2
1. LITTLE INTEREST OR PLEASURE IN DOING THINGS: SEVERAL DAYS

## 2024-07-02 NOTE — PROGRESS NOTES
MA Rooming Questions  Patient: Mi Powers  MRN: [id number]    Date: 7/2/2024        1. Do you have any new issues?   no         2. Do you need any refills on medications?    no    3. Have you had any imaging done since your last visit?   no    4. Have you been hospitalized or seen in the emergency room since your last visit here?   no    5. Did the patient have a depression screening completed today? Yes    No data recorded     PHQ-9 Given to (if applicable):               PHQ-9 Score (if applicable):                     [] Positive     []  Negative              Does question #9 need addressed (if applicable)                     [] Yes    []  No               Maribel Wynne CMA      
[place]. Her endocrinologist does not recommend her to have radioiodine therapy and she has been followed closely since then.    She has laboratory workups on April 2, 2018 for her my anemia and thrombocytopenia.  All the workups were normal except she was found to have severe iron deficiency.  BCR/ABL analysis was sent and she is negative for BCR/ABL translocations.    I recognize that she has significant iron deficiency on recent blood test done on March 18, 2021.  Since she has significant iron deficiency and she could not tolerate oral iron supplementation, we gave injectafer infusion on 4/15,2021.     On July 2, 2023, she presented to me for follow up. I have been following her for iron deficiency anemia, relative eosinophilia and history of papillary thyroid cancer. I believe her iron deficiency anemia is due to menorrhagia.  She stated that she stopped taking ferrous sulfate since it causes her severe constipation.      She received iron infusion on April 15, 2021 [total 1500 mg] and again on 6/26/23 (1500 mg). She does not require any more iron infusions since then.  She stated that her general sense of wellbeing is getting much improved with iron infusion.    I recognize that she has normal iron status on 6/25/24 blood test and she doesn't need iron infusion this time. I will repeat iron panel in 6 months and will see her again after that.     Low vit B12 level - oral B12 is not helping. Recommend to continue with B12 injection every 14 days. It was started since 12/2023.     She stated that she changed her birth control pills and she started having menorrhagia since then.    Constipation - she is on senna. Recommend her to add colace.     She does not have any other significant symptoms at today's visit.    PAST MEDICAL HISTORY:  Past medical history significant for:  1.      Papillary thyroid cancer.  2.      Migraine headaches.      PAST SURGICAL HISTORY:  Past surgical history significant

## 2024-09-26 ENCOUNTER — HOSPITAL ENCOUNTER (OUTPATIENT)
Age: 39
Discharge: HOME | End: 2024-09-26
Payer: COMMERCIAL

## 2024-09-26 LAB
T3FREE SERPL-MCNC: 3.17 PG/ML (ref 2.3–4.2)
T4 FREE SERPL-MCNC: 1.2 NG/DL (ref 0.9–1.8)
TSH SERPL DL<=0.05 MIU/L-ACNC: 0.86 UIU/ML (ref 0.27–4.2)

## 2024-09-26 PROCEDURE — 36415 COLL VENOUS BLD VENIPUNCTURE: CPT

## 2024-09-26 PROCEDURE — 84439 ASSAY OF FREE THYROXINE: CPT

## 2024-09-26 PROCEDURE — 84443 ASSAY THYROID STIM HORMONE: CPT

## 2024-09-26 PROCEDURE — 84481 FREE ASSAY (FT-3): CPT

## 2024-09-26 PROCEDURE — 84480 ASSAY TRIIODOTHYRONINE (T3): CPT

## 2024-09-27 LAB — T3 SERPL-MCNC: 1 NG/DL (ref 80–200)

## 2024-10-21 RX ORDER — CYANOCOBALAMIN 1000 UG/ML
1000 INJECTION, SOLUTION INTRAMUSCULAR; SUBCUTANEOUS
Qty: 2 ML | Refills: 2 | Status: SHIPPED | OUTPATIENT
Start: 2024-10-21

## 2024-10-21 NOTE — TELEPHONE ENCOUNTER
Patient contacted our office in need of refill for b12 injections and syringes . Patient has a scheduled appointment on 1/2/25. Patients preferred pharmacy is [facility]. Pending for patients chart provider mac hamilton.

## 2024-11-15 ENCOUNTER — HOSPITAL ENCOUNTER (OUTPATIENT)
Age: 39
Setting detail: OBSERVATION
Discharge: HOME | End: 2024-11-17
Attending: STUDENT IN AN ORGANIZED HEALTH CARE EDUCATION/TRAINING PROGRAM | Admitting: HOSPITALIST
Payer: COMMERCIAL

## 2024-11-15 DIAGNOSIS — K52.9 GASTROENTERITIS: Primary | ICD-10-CM

## 2024-11-15 DIAGNOSIS — R11.2 NAUSEA VOMITING AND DIARRHEA: ICD-10-CM

## 2024-11-15 DIAGNOSIS — R19.7 NAUSEA VOMITING AND DIARRHEA: ICD-10-CM

## 2024-11-15 LAB
ALBUMIN SERPL-MCNC: 4.2 G/DL (ref 3.4–5)
ALBUMIN/GLOB SERPL: 1.4 {RATIO} (ref 1.1–2.2)
ALP SERPL-CCNC: 43 U/L (ref 40–129)
ALT SERPL-CCNC: 14 U/L (ref 10–40)
ANION GAP SERPL CALCULATED.3IONS-SCNC: 17 MMOL/L (ref 4–16)
AST SERPL-CCNC: 15 U/L (ref 15–37)
BACTERIA URNS QL MICRO: ABNORMAL
BASOPHILS # BLD: 0.03 K/UL
BASOPHILS NFR BLD: 0 % (ref 0–1)
BILIRUB SERPL-MCNC: 0.6 MG/DL (ref 0–1)
BILIRUB UR QL STRIP: NEGATIVE
BUN SERPL-MCNC: 12 MG/DL (ref 6–23)
CALCIUM SERPL-MCNC: 8.5 MG/DL (ref 8.3–10.6)
CHLORIDE SERPL-SCNC: 105 MMOL/L (ref 99–110)
CLARITY UR: CLEAR
CO2 SERPL-SCNC: 19 MMOL/L (ref 21–32)
COLOR UR: YELLOW
CREAT SERPL-MCNC: 0.6 MG/DL (ref 0.6–1.1)
CRP SERPL HS-MCNC: 75.9 MG/L (ref 0–5)
EOSINOPHIL # BLD: 0.01 K/UL
EOSINOPHILS RELATIVE PERCENT: 0 % (ref 0–3)
EPI CELLS #/AREA URNS HPF: ABNORMAL /HPF
ERYTHROCYTE [DISTWIDTH] IN BLOOD BY AUTOMATED COUNT: 13 % (ref 11.7–14.9)
GFR, ESTIMATED: >90 ML/MIN/1.73M2
GLUCOSE SERPL-MCNC: 143 MG/DL (ref 70–99)
GLUCOSE UR STRIP-MCNC: NEGATIVE MG/DL
HCG UR QL: NEGATIVE
HCT VFR BLD AUTO: 41.4 % (ref 37–47)
HGB BLD-MCNC: 13.9 G/DL (ref 12.5–16)
HGB UR QL STRIP.AUTO: ABNORMAL
IMM GRANULOCYTES # BLD AUTO: 0.06 K/UL
IMM GRANULOCYTES NFR BLD: 0 %
KETONES UR STRIP-MCNC: 15 MG/DL
LACTATE BLDV-SCNC: 1.9 MMOL/L (ref 0.4–2)
LEUKOCYTE ESTERASE UR QL STRIP: NEGATIVE
LIPASE SERPL-CCNC: 11 U/L (ref 13–60)
LYMPHOCYTES NFR BLD: 0.38 K/UL
LYMPHOCYTES RELATIVE PERCENT: 2 % (ref 24–44)
MAGNESIUM SERPL-MCNC: 1.8 MG/DL (ref 1.8–2.4)
MCH RBC QN AUTO: 29.1 PG (ref 27–31)
MCHC RBC AUTO-ENTMCNC: 33.6 G/DL (ref 32–36)
MCV RBC AUTO: 86.8 FL (ref 78–100)
MONOCYTES NFR BLD: 0.35 K/UL
MONOCYTES NFR BLD: 2 % (ref 0–4)
NEUTROPHILS NFR BLD: 95 % (ref 36–66)
NEUTS SEG NFR BLD: 15.94 K/UL
NITRITE UR QL STRIP: NEGATIVE
PH UR STRIP: 6 [PH] (ref 5–8)
PLATELET # BLD AUTO: 263 K/UL (ref 140–440)
PMV BLD AUTO: 11 FL (ref 7.5–11.1)
POTASSIUM SERPL-SCNC: 4 MMOL/L (ref 3.5–5.1)
PROCALCITONIN SERPL-MCNC: 0.61 NG/ML
PROT SERPL-MCNC: 7.2 G/DL (ref 6.4–8.2)
PROT UR STRIP-MCNC: 100 MG/DL
RBC # BLD AUTO: 4.77 M/UL (ref 4.2–5.4)
RBC #/AREA URNS HPF: ABNORMAL /HPF
SODIUM SERPL-SCNC: 141 MMOL/L (ref 135–145)
SP GR UR STRIP: >1.03 (ref 1–1.03)
UROBILINOGEN UR STRIP-ACNC: 0.2 EU/DL (ref 0–1)
WBC #/AREA URNS HPF: ABNORMAL /HPF
WBC OTHER # BLD: 16.8 K/UL (ref 4–10.5)

## 2024-11-15 PROCEDURE — 6370000000 HC RX 637 (ALT 250 FOR IP): Performed by: PHYSICIAN ASSISTANT

## 2024-11-15 PROCEDURE — 83605 ASSAY OF LACTIC ACID: CPT

## 2024-11-15 PROCEDURE — 6360000002 HC RX W HCPCS: Performed by: PHYSICIAN ASSISTANT

## 2024-11-15 PROCEDURE — 96375 TX/PRO/DX INJ NEW DRUG ADDON: CPT

## 2024-11-15 PROCEDURE — 84703 CHORIONIC GONADOTROPIN ASSAY: CPT

## 2024-11-15 PROCEDURE — G0378 HOSPITAL OBSERVATION PER HR: HCPCS

## 2024-11-15 PROCEDURE — 96374 THER/PROPH/DIAG INJ IV PUSH: CPT

## 2024-11-15 PROCEDURE — 93005 ELECTROCARDIOGRAM TRACING: CPT | Performed by: STUDENT IN AN ORGANIZED HEALTH CARE EDUCATION/TRAINING PROGRAM

## 2024-11-15 PROCEDURE — 2580000003 HC RX 258: Performed by: STUDENT IN AN ORGANIZED HEALTH CARE EDUCATION/TRAINING PROGRAM

## 2024-11-15 PROCEDURE — 99285 EMERGENCY DEPT VISIT HI MDM: CPT

## 2024-11-15 PROCEDURE — 96361 HYDRATE IV INFUSION ADD-ON: CPT

## 2024-11-15 PROCEDURE — 84145 PROCALCITONIN (PCT): CPT

## 2024-11-15 PROCEDURE — 83690 ASSAY OF LIPASE: CPT

## 2024-11-15 PROCEDURE — 81001 URINALYSIS AUTO W/SCOPE: CPT

## 2024-11-15 PROCEDURE — 86140 C-REACTIVE PROTEIN: CPT

## 2024-11-15 PROCEDURE — 2580000003 HC RX 258: Performed by: PHYSICIAN ASSISTANT

## 2024-11-15 PROCEDURE — 83735 ASSAY OF MAGNESIUM: CPT

## 2024-11-15 PROCEDURE — 85025 COMPLETE CBC W/AUTO DIFF WBC: CPT

## 2024-11-15 PROCEDURE — 6370000000 HC RX 637 (ALT 250 FOR IP): Performed by: STUDENT IN AN ORGANIZED HEALTH CARE EDUCATION/TRAINING PROGRAM

## 2024-11-15 PROCEDURE — 80053 COMPREHEN METABOLIC PANEL: CPT

## 2024-11-15 PROCEDURE — 6360000002 HC RX W HCPCS: Performed by: STUDENT IN AN ORGANIZED HEALTH CARE EDUCATION/TRAINING PROGRAM

## 2024-11-15 RX ORDER — SODIUM CHLORIDE 9 MG/ML
INJECTION, SOLUTION INTRAVENOUS CONTINUOUS
Status: DISPENSED | OUTPATIENT
Start: 2024-11-15 | End: 2024-11-16

## 2024-11-15 RX ORDER — LIOTHYRONINE SODIUM 5 UG/1
2.5 TABLET ORAL NIGHTLY
Status: DISCONTINUED | OUTPATIENT
Start: 2024-11-15 | End: 2024-11-17 | Stop reason: HOSPADM

## 2024-11-15 RX ORDER — POTASSIUM CHLORIDE 1500 MG/1
40 TABLET, EXTENDED RELEASE ORAL PRN
Status: DISCONTINUED | OUTPATIENT
Start: 2024-11-15 | End: 2024-11-17 | Stop reason: HOSPADM

## 2024-11-15 RX ORDER — SODIUM CHLORIDE 0.9 % (FLUSH) 0.9 %
5-40 SYRINGE (ML) INJECTION PRN
Status: DISCONTINUED | OUTPATIENT
Start: 2024-11-15 | End: 2024-11-17 | Stop reason: HOSPADM

## 2024-11-15 RX ORDER — SCOLOPAMINE TRANSDERMAL SYSTEM 1 MG/1
1 PATCH, EXTENDED RELEASE TRANSDERMAL ONCE
Status: DISCONTINUED | OUTPATIENT
Start: 2024-11-15 | End: 2024-11-17 | Stop reason: HOSPADM

## 2024-11-15 RX ORDER — 0.9 % SODIUM CHLORIDE 0.9 %
1000 INTRAVENOUS SOLUTION INTRAVENOUS ONCE
Status: COMPLETED | OUTPATIENT
Start: 2024-11-15 | End: 2024-11-15

## 2024-11-15 RX ORDER — LEVOTHYROXINE SODIUM 88 UG/1
88 TABLET ORAL DAILY
Status: DISCONTINUED | OUTPATIENT
Start: 2024-11-16 | End: 2024-11-17 | Stop reason: HOSPADM

## 2024-11-15 RX ORDER — PANTOPRAZOLE SODIUM 40 MG/10ML
40 INJECTION, POWDER, LYOPHILIZED, FOR SOLUTION INTRAVENOUS ONCE
Status: COMPLETED | OUTPATIENT
Start: 2024-11-15 | End: 2024-11-15

## 2024-11-15 RX ORDER — POLYETHYLENE GLYCOL 3350 17 G/17G
17 POWDER, FOR SOLUTION ORAL DAILY PRN
Status: DISCONTINUED | OUTPATIENT
Start: 2024-11-15 | End: 2024-11-17 | Stop reason: HOSPADM

## 2024-11-15 RX ORDER — ACETAMINOPHEN 500 MG
1000 TABLET ORAL ONCE
Status: COMPLETED | OUTPATIENT
Start: 2024-11-15 | End: 2024-11-15

## 2024-11-15 RX ORDER — SODIUM CHLORIDE 9 MG/ML
INJECTION, SOLUTION INTRAVENOUS PRN
Status: DISCONTINUED | OUTPATIENT
Start: 2024-11-15 | End: 2024-11-17 | Stop reason: HOSPADM

## 2024-11-15 RX ORDER — ONDANSETRON 2 MG/ML
4 INJECTION INTRAMUSCULAR; INTRAVENOUS ONCE
Status: COMPLETED | OUTPATIENT
Start: 2024-11-15 | End: 2024-11-15

## 2024-11-15 RX ORDER — ACETAMINOPHEN 325 MG/1
650 TABLET ORAL EVERY 6 HOURS PRN
Status: DISCONTINUED | OUTPATIENT
Start: 2024-11-15 | End: 2024-11-17 | Stop reason: HOSPADM

## 2024-11-15 RX ORDER — SODIUM CHLORIDE 0.9 % (FLUSH) 0.9 %
5-40 SYRINGE (ML) INJECTION EVERY 12 HOURS SCHEDULED
Status: DISCONTINUED | OUTPATIENT
Start: 2024-11-15 | End: 2024-11-17 | Stop reason: HOSPADM

## 2024-11-15 RX ORDER — LIOTHYRONINE SODIUM 5 UG/1
5 TABLET ORAL DAILY
Status: DISCONTINUED | OUTPATIENT
Start: 2024-11-15 | End: 2024-11-17 | Stop reason: HOSPADM

## 2024-11-15 RX ORDER — LEVOTHYROXINE SODIUM 100 UG/1
100 TABLET ORAL DAILY
Status: DISCONTINUED | OUTPATIENT
Start: 2024-11-16 | End: 2024-11-15

## 2024-11-15 RX ORDER — DIPHENHYDRAMINE HYDROCHLORIDE 50 MG/ML
25 INJECTION INTRAMUSCULAR; INTRAVENOUS ONCE
Status: COMPLETED | OUTPATIENT
Start: 2024-11-15 | End: 2024-11-15

## 2024-11-15 RX ORDER — POTASSIUM CHLORIDE 7.45 MG/ML
10 INJECTION INTRAVENOUS PRN
Status: DISCONTINUED | OUTPATIENT
Start: 2024-11-15 | End: 2024-11-17 | Stop reason: HOSPADM

## 2024-11-15 RX ORDER — KETOROLAC TROMETHAMINE 15 MG/ML
15 INJECTION, SOLUTION INTRAMUSCULAR; INTRAVENOUS ONCE
Status: COMPLETED | OUTPATIENT
Start: 2024-11-15 | End: 2024-11-15

## 2024-11-15 RX ORDER — MAGNESIUM SULFATE IN WATER 40 MG/ML
2000 INJECTION, SOLUTION INTRAVENOUS PRN
Status: DISCONTINUED | OUTPATIENT
Start: 2024-11-15 | End: 2024-11-17 | Stop reason: HOSPADM

## 2024-11-15 RX ORDER — PANTOPRAZOLE SODIUM 40 MG/10ML
40 INJECTION, POWDER, LYOPHILIZED, FOR SOLUTION INTRAVENOUS DAILY
Status: DISCONTINUED | OUTPATIENT
Start: 2024-11-16 | End: 2024-11-16 | Stop reason: ALTCHOICE

## 2024-11-15 RX ORDER — ONDANSETRON 2 MG/ML
4 INJECTION INTRAMUSCULAR; INTRAVENOUS EVERY 6 HOURS PRN
Status: DISCONTINUED | OUTPATIENT
Start: 2024-11-15 | End: 2024-11-17 | Stop reason: HOSPADM

## 2024-11-15 RX ORDER — ONDANSETRON 4 MG/1
4 TABLET, ORALLY DISINTEGRATING ORAL EVERY 8 HOURS PRN
Status: DISCONTINUED | OUTPATIENT
Start: 2024-11-15 | End: 2024-11-17 | Stop reason: HOSPADM

## 2024-11-15 RX ORDER — METRONIDAZOLE 500 MG/100ML
500 INJECTION, SOLUTION INTRAVENOUS EVERY 8 HOURS
Status: DISCONTINUED | OUTPATIENT
Start: 2024-11-15 | End: 2024-11-15

## 2024-11-15 RX ORDER — NORGESTIMATE AND ETHINYL ESTRADIOL 0.25-0.035
1 KIT ORAL DAILY
Status: DISCONTINUED | OUTPATIENT
Start: 2024-11-16 | End: 2024-11-17 | Stop reason: HOSPADM

## 2024-11-15 RX ORDER — PROCHLORPERAZINE EDISYLATE 5 MG/ML
10 INJECTION INTRAMUSCULAR; INTRAVENOUS ONCE
Status: COMPLETED | OUTPATIENT
Start: 2024-11-15 | End: 2024-11-15

## 2024-11-15 RX ORDER — PROCHLORPERAZINE EDISYLATE 5 MG/ML
10 INJECTION INTRAMUSCULAR; INTRAVENOUS EVERY 6 HOURS PRN
Status: DISCONTINUED | OUTPATIENT
Start: 2024-11-15 | End: 2024-11-17 | Stop reason: HOSPADM

## 2024-11-15 RX ORDER — ACETAMINOPHEN 650 MG/1
650 SUPPOSITORY RECTAL EVERY 6 HOURS PRN
Status: DISCONTINUED | OUTPATIENT
Start: 2024-11-15 | End: 2024-11-17 | Stop reason: HOSPADM

## 2024-11-15 RX ORDER — LEVOTHYROXINE SODIUM 88 UG/1
88 TABLET ORAL DAILY
COMMUNITY
Start: 2024-11-06

## 2024-11-15 RX ADMIN — LIOTHYRONINE SODIUM 5 MCG: 5 TABLET ORAL at 17:16

## 2024-11-15 RX ADMIN — PROCHLORPERAZINE EDISYLATE 10 MG: 5 INJECTION INTRAMUSCULAR; INTRAVENOUS at 15:13

## 2024-11-15 RX ADMIN — KETOROLAC TROMETHAMINE 15 MG: 15 INJECTION, SOLUTION INTRAMUSCULAR; INTRAVENOUS at 15:13

## 2024-11-15 RX ADMIN — SODIUM CHLORIDE 1000 ML: 9 INJECTION, SOLUTION INTRAVENOUS at 15:14

## 2024-11-15 RX ADMIN — SODIUM CHLORIDE: 9 INJECTION, SOLUTION INTRAVENOUS at 17:19

## 2024-11-15 RX ADMIN — DIPHENHYDRAMINE HYDROCHLORIDE 25 MG: 50 INJECTION INTRAMUSCULAR; INTRAVENOUS at 15:13

## 2024-11-15 RX ADMIN — ONDANSETRON 4 MG: 2 INJECTION, SOLUTION INTRAMUSCULAR; INTRAVENOUS at 14:12

## 2024-11-15 RX ADMIN — ACETAMINOPHEN 1000 MG: 500 TABLET ORAL at 14:12

## 2024-11-15 RX ADMIN — SODIUM CHLORIDE 1000 ML: 9 INJECTION, SOLUTION INTRAVENOUS at 14:12

## 2024-11-15 RX ADMIN — PANTOPRAZOLE SODIUM 40 MG: 40 INJECTION, POWDER, FOR SOLUTION INTRAVENOUS at 15:13

## 2024-11-15 RX ADMIN — CEFTRIAXONE SODIUM 1000 MG: 1 INJECTION, POWDER, FOR SOLUTION INTRAMUSCULAR; INTRAVENOUS at 17:19

## 2024-11-15 ASSESSMENT — PAIN SCALES - GENERAL: PAINLEVEL_OUTOF10: 0

## 2024-11-15 ASSESSMENT — PAIN - FUNCTIONAL ASSESSMENT: PAIN_FUNCTIONAL_ASSESSMENT: NONE - DENIES PAIN

## 2024-11-15 ASSESSMENT — LIFESTYLE VARIABLES
HOW MANY STANDARD DRINKS CONTAINING ALCOHOL DO YOU HAVE ON A TYPICAL DAY: PATIENT DOES NOT DRINK
HOW OFTEN DO YOU HAVE A DRINK CONTAINING ALCOHOL: NEVER

## 2024-11-15 NOTE — PLAN OF CARE
Problem: Risk for Elopement  Goal: Patient will not exit the unit/facility without proper excort  11/15/2024 1740 by Aleida Cotto, RN  Outcome: Completed  11/15/2024 1651 by Aleida Cotto, RN  Outcome Erreor

## 2024-11-15 NOTE — PROGRESS NOTES
4 Eyes Skin Assessment     NAME:  Mi Powers  YOB: 1985  MEDICAL RECORD NUMBER:  [id number]    The patient is being assessed for  Admission    I agree that at least one RN has performed a thorough Head to Toe Skin Assessment on the patient. ALL assessment sites listed below have been assessed.      Areas assessed by both nurses:    Head, Face, Ears, Shoulders, Back, Chest, Arms, Elbows, Hands, Sacrum. Buttock, Coccyx, Ischium, and Legs. Feet and Heels        Does the Patient have a Wound? No noted wound(s)       Isaias Prevention initiated by RN: No  Wound Care Orders initiated by RN: No    Pressure Injury (Stage 3,4, Unstageable, DTI, NWPT, and Complex wounds) if present, place Wound referral order by RN under : No    New Ostomies, if present place, Ostomy referral order under : No     Nurse 1 eSignature: Electronically signed by Aleida Cotto RN on 11/15/24 at 5:42 PM EST    **SHARE this note so that the co-signing nurse can place an eSignature**    Nurse 2 eSignature: Electronically signed by Priscila Teixeira RN on 11/15/24 at 5:48 PM EST

## 2024-11-15 NOTE — ED PROVIDER NOTES
Emergency Department Encounter    Patient: Mi Powers  MRN: [id number]  : 1985  Date of Evaluation: 11/15/2024  ED Provider:  Roberto Abad MD    Triage Chief Complaint:   Vomiting and Diarrhea (Went to bed feeling fine and then awoke at 2230 with nausea, vomiting and diarrhea. Has had some blood in vomit.)    [place]:  Mi Powers is a 39 y.o. female with history significant for papillary carcinoma of the thyroid s/p resection, that presents for vomiting and diarrhea.  Her symptoms started the evening before presentation, when she developed nausea, that has progressed in intensity to be severe, is pretty much constant, and is associated with uncountable episodes of emesis, initially of gastric contents, and then of bilious characteristics.  Her symptoms have also been associated with scant hematemesis, with small red-tinged pieces that are about the size of a dime.  She also has had diarrhea, and endorses significant hematochezia, quantifying more than 5 bowel movements the day of presentation.  She denies any abdominal pain.  Was not aware of any fever or chills, but has fever on her arrival to the emergency department.    ROS - see HPI, below listed is current ROS at time of my eval:  Systems reviewed and negative except as above.     Past Medical History:   Diagnosis Date    Cancer (HCC)     path report from thyroid bx- + papillary carcinoma 2016- tx with radioactive iodine and surgery\"- follows with Dr Juan    Enlarged thyroid     for bx 2016    H/O echocardiogram 2021    Left ventricular function and size is normal, EF is estimated at 55-60%. Mild mitral and tricuspid regurgitation is present    Heart abnormality     heart murmur    Kidney stone     \"use to see Dr DRISCOLL, last kidney stone over a year ago, started approx 4 yrs ago- passed some in past\"    Murmur, heart     \"told I had this with pregnancy in , no echo, do have occ palpitations when lay on right side\"

## 2024-11-15 NOTE — PLAN OF CARE
Problem: Risk for Elopement  Goal: Patient will not exit the unit/facility without proper excort  Outcome: Progressing     Problem: Gastrointestinal - Adult  Goal: Minimal or absence of nausea and vomiting  Outcome: Progressing  Goal: Maintains or returns to baseline bowel function  Outcome: Progressing  Goal: Maintains adequate nutritional intake  Outcome: Progressing     Problem: Metabolic/Fluid and Electrolytes - Adult  Goal: Electrolytes maintained within normal limits  Outcome: Progressing  Goal: Hemodynamic stability and optimal renal function maintained  Outcome: Progressing

## 2024-11-15 NOTE — H&P
Urine Cultures: No results found for: \"LABURIN\"  Blood Cultures: No results found for: \"BC\"  No results found for: \"BLOODCULT2\"  Organism: No results found for: \"ORG\"    Imaging/Diagnostics Last 24 Hours   No results found.      Electronically signed by Aileen Day PA-C on 11/15/2024 at 3:23 PM

## 2024-11-16 LAB
ANION GAP SERPL CALCULATED.3IONS-SCNC: 9 MMOL/L (ref 4–16)
BASOPHILS # BLD: 0.02 K/UL
BASOPHILS NFR BLD: 0 % (ref 0–1)
BUN SERPL-MCNC: 9 MG/DL (ref 6–23)
CALCIUM SERPL-MCNC: 7.4 MG/DL (ref 8.3–10.6)
CHLORIDE SERPL-SCNC: 111 MMOL/L (ref 99–110)
CO2 SERPL-SCNC: 21 MMOL/L (ref 21–32)
CREAT SERPL-MCNC: 0.5 MG/DL (ref 0.6–1.1)
EKG ATRIAL RATE: 72 BPM
EKG DIAGNOSIS: NORMAL
EKG P AXIS: 64 DEGREES
EKG P-R INTERVAL: 148 MS
EKG Q-T INTERVAL: 408 MS
EKG QRS DURATION: 78 MS
EKG QTC CALCULATION (BAZETT): 446 MS
EKG R AXIS: 68 DEGREES
EKG T AXIS: 52 DEGREES
EKG VENTRICULAR RATE: 72 BPM
EOSINOPHIL # BLD: 0.1 K/UL
EOSINOPHILS RELATIVE PERCENT: 1 % (ref 0–3)
ERYTHROCYTE [DISTWIDTH] IN BLOOD BY AUTOMATED COUNT: 13.1 % (ref 11.7–14.9)
GFR, ESTIMATED: >90 ML/MIN/1.73M2
GLUCOSE SERPL-MCNC: 82 MG/DL (ref 70–99)
HCT VFR BLD AUTO: 34.4 % (ref 37–47)
HGB BLD-MCNC: 11.3 G/DL (ref 12.5–16)
IMM GRANULOCYTES # BLD AUTO: 0.05 K/UL
IMM GRANULOCYTES NFR BLD: 1 %
LYMPHOCYTES NFR BLD: 1.14 K/UL
LYMPHOCYTES RELATIVE PERCENT: 12 % (ref 24–44)
MCH RBC QN AUTO: 29 PG (ref 27–31)
MCHC RBC AUTO-ENTMCNC: 32.8 G/DL (ref 32–36)
MCV RBC AUTO: 88.2 FL (ref 78–100)
MONOCYTES NFR BLD: 0.96 K/UL
MONOCYTES NFR BLD: 10 % (ref 0–4)
NEUTROPHILS NFR BLD: 75 % (ref 36–66)
NEUTS SEG NFR BLD: 6.94 K/UL
PLATELET # BLD AUTO: 204 K/UL (ref 140–440)
PMV BLD AUTO: 11.2 FL (ref 7.5–11.1)
POTASSIUM SERPL-SCNC: 3.6 MMOL/L (ref 3.5–5.1)
RBC # BLD AUTO: 3.9 M/UL (ref 4.2–5.4)
SODIUM SERPL-SCNC: 141 MMOL/L (ref 135–145)
T4 FREE SERPL-MCNC: 0.9 NG/DL (ref 0.9–1.8)
TSH SERPL DL<=0.05 MIU/L-ACNC: 0.15 UIU/ML (ref 0.27–4.2)
WBC OTHER # BLD: 9.2 K/UL (ref 4–10.5)

## 2024-11-16 PROCEDURE — 6370000000 HC RX 637 (ALT 250 FOR IP): Performed by: NURSE PRACTITIONER

## 2024-11-16 PROCEDURE — 85025 COMPLETE CBC W/AUTO DIFF WBC: CPT

## 2024-11-16 PROCEDURE — G0378 HOSPITAL OBSERVATION PER HR: HCPCS

## 2024-11-16 PROCEDURE — 96376 TX/PRO/DX INJ SAME DRUG ADON: CPT

## 2024-11-16 PROCEDURE — 94761 N-INVAS EAR/PLS OXIMETRY MLT: CPT

## 2024-11-16 PROCEDURE — 93010 ELECTROCARDIOGRAM REPORT: CPT | Performed by: INTERNAL MEDICINE

## 2024-11-16 PROCEDURE — 6360000002 HC RX W HCPCS: Performed by: PHYSICIAN ASSISTANT

## 2024-11-16 PROCEDURE — 2580000003 HC RX 258: Performed by: PHYSICIAN ASSISTANT

## 2024-11-16 PROCEDURE — 36415 COLL VENOUS BLD VENIPUNCTURE: CPT

## 2024-11-16 PROCEDURE — 84439 ASSAY OF FREE THYROXINE: CPT

## 2024-11-16 PROCEDURE — 84443 ASSAY THYROID STIM HORMONE: CPT

## 2024-11-16 PROCEDURE — 96361 HYDRATE IV INFUSION ADD-ON: CPT

## 2024-11-16 PROCEDURE — 80048 BASIC METABOLIC PNL TOTAL CA: CPT

## 2024-11-16 PROCEDURE — 6370000000 HC RX 637 (ALT 250 FOR IP): Performed by: PHYSICIAN ASSISTANT

## 2024-11-16 RX ORDER — CALCIUM CARBONATE 500 MG/1
500 TABLET, CHEWABLE ORAL 3 TIMES DAILY PRN
Status: DISCONTINUED | OUTPATIENT
Start: 2024-11-16 | End: 2024-11-17 | Stop reason: HOSPADM

## 2024-11-16 RX ADMIN — LEVOTHYROXINE SODIUM 88 MCG: 0.09 TABLET ORAL at 05:35

## 2024-11-16 RX ADMIN — CEFTRIAXONE SODIUM 1000 MG: 1 INJECTION, POWDER, FOR SOLUTION INTRAMUSCULAR; INTRAVENOUS at 15:17

## 2024-11-16 RX ADMIN — ANTACID TABLETS 500 MG: 500 TABLET, CHEWABLE ORAL at 13:31

## 2024-11-16 RX ADMIN — LIOTHYRONINE SODIUM 5 MCG: 5 TABLET ORAL at 09:36

## 2024-11-16 RX ADMIN — LIOTHYRONINE SODIUM 2.5 MCG: 5 TABLET ORAL at 21:23

## 2024-11-16 RX ADMIN — Medication 10 MG: at 21:23

## 2024-11-16 RX ADMIN — PANTOPRAZOLE SODIUM 40 MG: 40 INJECTION, POWDER, FOR SOLUTION INTRAVENOUS at 09:37

## 2024-11-16 RX ADMIN — SODIUM CHLORIDE, PRESERVATIVE FREE 10 ML: 5 INJECTION INTRAVENOUS at 09:39

## 2024-11-16 RX ADMIN — ACETAMINOPHEN 650 MG: 325 TABLET ORAL at 18:36

## 2024-11-16 ASSESSMENT — PAIN DESCRIPTION - PAIN TYPE: TYPE: ACUTE PAIN

## 2024-11-16 ASSESSMENT — PAIN DESCRIPTION - ORIENTATION: ORIENTATION: RIGHT;LEFT

## 2024-11-16 ASSESSMENT — PAIN DESCRIPTION - FREQUENCY: FREQUENCY: INTERMITTENT

## 2024-11-16 ASSESSMENT — PAIN SCALES - GENERAL
PAINLEVEL_OUTOF10: 0
PAINLEVEL_OUTOF10: 3

## 2024-11-16 ASSESSMENT — PAIN DESCRIPTION - LOCATION: LOCATION: HEAD

## 2024-11-16 ASSESSMENT — PAIN DESCRIPTION - DIRECTION: RADIATING_TOWARDS: NO

## 2024-11-16 ASSESSMENT — PAIN - FUNCTIONAL ASSESSMENT: PAIN_FUNCTIONAL_ASSESSMENT: ACTIVITIES ARE NOT PREVENTED

## 2024-11-16 ASSESSMENT — PAIN DESCRIPTION - DESCRIPTORS: DESCRIPTORS: ACHING;DISCOMFORT

## 2024-11-16 ASSESSMENT — PAIN DESCRIPTION - ONSET: ONSET: ON-GOING

## 2024-11-16 NOTE — PROGRESS NOTES
V2.0  [facility]      Name:  Mi Powers /Age/Sex: 1985  (39 y.o. female)   MRN & CSN:  [id number] & [id number] Encounter Date/Time: 2024 5:56 PM EST    Location:   PCP: Ana Maria Li APRN - NP       Hospital Day: 2    Assessment and Plan:   Mi Powers is a 39 y.o. female with past medical history of thyroid cancer s/p total thyroidectomy, endometriosis who presents with Gastroenteritis    Nausea/vomiting/diarrhea  SIRS  -Presented with acute onset of several episodes nausea/vomiting and watery diarrhea.  Reported scant amount of blood in emesis and episode of bloody stool.  Exposed to nephew with similar symptoms 2 days ago.  -SIRS criteria met with temp 101.2, WBC 16. BP stable, LA WNL.   - Hgb stable  - Leukocytosis improved, Cont Roceohin, UA + bacteria, WBC and protien. Stool studies unable to be obtained.    -CLD, ADAT  -Continue IV fluids, IV antiemetics  -CBC and BMP stable     High anion gap metabolic acidosis, resolved     History of thyroid cancer s/p total thyroidectomy  -Continue home Synthroid and Cytomel     Chronic pain  History of endometriosis  -Has medical marijuana card    This patient was seen and examined in conjunction/discussed with Dr. Sharon Wilder. He was agreeable with the assessment and plan as dictated above.     Current Living situation: Home  Expected Disposition: Home  Estimated D/C:     Diet ADULT DIET; Clear Liquid   DVT Prophylaxis [] Lovenox, []  Heparin, [] SCDs, [x] Ambulation,  [] Eliquis, [] Xarelto []Coumadin   Code Status Full Code   Disposition Patient requires continued admission due to persistent nausea   Surrogate Decision Maker/ EVA Freire      Personally reviewed Lab Studies and Imaging     Subjective:     Chief Complaint: Vomiting and Diarrhea      Patient seen and examined at bedside.  Awake alert oriented x 3.  Hemodynamically stable.  No further episodes of nausea and vomiting but unable to

## 2024-11-16 NOTE — PLAN OF CARE
Problem: Gastrointestinal - Adult  Goal: Minimal or absence of nausea and vomiting  Outcome: Progressing  Goal: Maintains adequate nutritional intake  Outcome: Progressing     Problem: Pain  Goal: Verbalizes/displays adequate comfort level or baseline comfort level  Outcome: Progressing     Problem: Gastrointestinal - Adult  Goal: Maintains or returns to baseline bowel function  Outcome: Adequate for Discharge     Problem: Metabolic/Fluid and Electrolytes - Adult  Goal: Electrolytes maintained within normal limits  Outcome: Adequate for Discharge  Goal: Hemodynamic stability and optimal renal function maintained  Outcome: Adequate for Discharge

## 2024-11-17 VITALS
DIASTOLIC BLOOD PRESSURE: 42 MMHG | RESPIRATION RATE: 16 BRPM | TEMPERATURE: 98.1 F | HEIGHT: 61 IN | WEIGHT: 130.29 LBS | HEART RATE: 48 BPM | OXYGEN SATURATION: 100 % | BODY MASS INDEX: 24.6 KG/M2 | SYSTOLIC BLOOD PRESSURE: 119 MMHG

## 2024-11-17 LAB — TSH SERPL DL<=0.05 MIU/L-ACNC: 0.54 UIU/ML (ref 0.27–4.2)

## 2024-11-17 PROCEDURE — G0378 HOSPITAL OBSERVATION PER HR: HCPCS

## 2024-11-17 PROCEDURE — 2580000003 HC RX 258: Performed by: PHYSICIAN ASSISTANT

## 2024-11-17 PROCEDURE — 36415 COLL VENOUS BLD VENIPUNCTURE: CPT

## 2024-11-17 PROCEDURE — 6360000002 HC RX W HCPCS: Performed by: PHYSICIAN ASSISTANT

## 2024-11-17 PROCEDURE — 6370000000 HC RX 637 (ALT 250 FOR IP): Performed by: PHYSICIAN ASSISTANT

## 2024-11-17 PROCEDURE — 84443 ASSAY THYROID STIM HORMONE: CPT

## 2024-11-17 PROCEDURE — 96376 TX/PRO/DX INJ SAME DRUG ADON: CPT

## 2024-11-17 PROCEDURE — 6370000000 HC RX 637 (ALT 250 FOR IP): Performed by: NURSE PRACTITIONER

## 2024-11-17 RX ORDER — POTASSIUM CHLORIDE 1500 MG/1
20 TABLET, EXTENDED RELEASE ORAL ONCE
Status: COMPLETED | OUTPATIENT
Start: 2024-11-17 | End: 2024-11-17

## 2024-11-17 RX ORDER — ONDANSETRON 4 MG/1
4 TABLET, ORALLY DISINTEGRATING ORAL EVERY 8 HOURS PRN
Qty: 30 TABLET | Refills: 0 | Status: SHIPPED | OUTPATIENT
Start: 2024-11-17

## 2024-11-17 RX ORDER — FLUCONAZOLE 100 MG/1
150 TABLET ORAL ONCE
Status: COMPLETED | OUTPATIENT
Start: 2024-11-17 | End: 2024-11-17

## 2024-11-17 RX ORDER — CEPHALEXIN 500 MG/1
500 CAPSULE ORAL 2 TIMES DAILY
Qty: 14 CAPSULE | Refills: 0 | Status: SHIPPED | OUTPATIENT
Start: 2024-11-17 | End: 2024-11-24

## 2024-11-17 RX ADMIN — CEFTRIAXONE SODIUM 1000 MG: 1 INJECTION, POWDER, FOR SOLUTION INTRAMUSCULAR; INTRAVENOUS at 12:50

## 2024-11-17 RX ADMIN — LEVOTHYROXINE SODIUM 88 MCG: 0.09 TABLET ORAL at 06:37

## 2024-11-17 RX ADMIN — PANTOPRAZOLE SODIUM 40 MG: 40 INJECTION, POWDER, FOR SOLUTION INTRAVENOUS at 08:14

## 2024-11-17 RX ADMIN — SODIUM CHLORIDE, PRESERVATIVE FREE 10 ML: 5 INJECTION INTRAVENOUS at 08:14

## 2024-11-17 RX ADMIN — POTASSIUM CHLORIDE 20 MEQ: 1500 TABLET, EXTENDED RELEASE ORAL at 09:54

## 2024-11-17 RX ADMIN — LIOTHYRONINE SODIUM 5 MCG: 5 TABLET ORAL at 08:14

## 2024-11-17 RX ADMIN — FLUCONAZOLE 150 MG: 100 TABLET ORAL at 13:46

## 2024-11-17 ASSESSMENT — PAIN SCALES - GENERAL: PAINLEVEL_OUTOF10: 0

## 2024-11-17 NOTE — PLAN OF CARE
Problem: Gastrointestinal - Adult  Goal: Minimal or absence of nausea and vomiting  11/17/2024 0821 by Haylee Suazo RN  Outcome: Progressing  11/16/2024 2349 by Mónica Waller, RN  Outcome: Progressing  Goal: Maintains or returns to baseline bowel function  Outcome: Progressing  Goal: Maintains adequate nutritional intake  Outcome: Progressing     Problem: Metabolic/Fluid and Electrolytes - Adult  Goal: Electrolytes maintained within normal limits  Outcome: Progressing  Goal: Hemodynamic stability and optimal renal function maintained  Outcome: Progressing     Problem: Pain  Goal: Verbalizes/displays adequate comfort level or baseline comfort level  Outcome: Progressing

## 2024-11-17 NOTE — PLAN OF CARE
Problem: Gastrointestinal - Adult  Goal: Minimal or absence of nausea and vomiting  11/17/2024 1335 by Barbara Cassidy RN  Outcome: Completed  11/17/2024 0821 by Haylee Suazo RN  Outcome: Progressing  11/16/2024 2349 by Mónica Waller RN  Outcome: Progressing  Goal: Maintains or returns to baseline bowel function  11/17/2024 1335 by Barbara Cassidy RN  Outcome: Completed  11/17/2024 0821 by Haylee Suazo RN  Outcome: Progressing  Goal: Maintains adequate nutritional intake  11/17/2024 1335 by Barbara Cassidy RN  Outcome: Completed  11/17/2024 0821 by Haylee Suazo RN  Outcome: Progressing     Problem: Metabolic/Fluid and Electrolytes - Adult  Goal: Electrolytes maintained within normal limits  11/17/2024 1335 by Barbara Cassidy RN  Outcome: Completed  11/17/2024 0821 by Haylee Suazo RN  Outcome: Progressing  Goal: Hemodynamic stability and optimal renal function maintained  11/17/2024 1335 by Barbara Cassidy RN  Outcome: Completed  11/17/2024 0821 by Haylee Suazo RN  Outcome: Progressing     Problem: Pain  Goal: Verbalizes/displays adequate comfort level or baseline comfort level  11/17/2024 1335 by Barbara Cassidy RN  Outcome: Completed  11/17/2024 0821 by Haylee Suazo RN  Outcome: Progressing     Problem: Safety - Adult  Goal: Free from fall injury  Outcome: Completed

## 2024-11-17 NOTE — DISCHARGE SUMMARY
V2.0  Discharge Summary    Name:  Mi Powers /Age/Sex: 1985 (39 y.o. female)   Admit Date: 11/15/2024  Discharge Date: 24    MRN & CSN:  [id number] & [id number] Encounter Date and Time 24 1:19 PM EST    Attending:  Sharon Wilder MD Discharging Provider: ADOLFO Blake NP       Hospital Course:     Brief HPI: Mi Powers is a 39 y.o. female with past medical history of thyroid cancer s/p total thyroidectomy, endometriosis who presents with Gastroenteritis     Brief Problem Based Course:   -Presented with acute onset of several episodes nausea/vomiting and watery diarrhea.  Reported scant amount of blood in emesis and episode of bloody stool.  Exposed to nephew with similar symptoms 2 days ago.  -SIRS criteria met with temp 101.2, WBC 16. BP stable, LA WNL.   - Hgb stable  - Leukocytosis improved, Cont Roceohin, UA + bacteria, WBC and protien. Stool studies unable to be obtained.    -CLD, ADAT  -Continue IV fluids, IV antiemetics  -CBC and BMP stable    UTI  -UA + bacteria, WBC and protien.  -Transition Rocephin to Keflex     High anion gap metabolic acidosis, resolved     History of thyroid cancer s/p total thyroidectomy  -Continue home Synthroid and Cytomel     Chronic pain  History of endometriosis  -Has medical marijuana card      The patient expressed appropriate understanding of, and agreement with the discharge recommendations, medications, and plan.     Consults this admission:  None    Discharge Diagnosis:   Gastroenteritis  SIRS  UTI  High anion gap metabolic acidosis  Hx thyroid cancer s/p total thyroidectomy  Chronic pain  Cannabinoid use    Discharge Instruction:   Follow up appointments: Follow with PCP 1 to 2 weeks  Primary care physician: Ana Maria Li APRN - NP within 2 weeks  Diet: regular diet   Activity: activity as tolerated  Disposition: Discharged to:   [x]Home, []HHC, []SNF, []Acute Rehab, []Hospice   Condition on discharge: Stable  Labs and

## 2024-11-18 ENCOUNTER — APPOINTMENT (OUTPATIENT)
Dept: CT IMAGING | Age: 39
End: 2024-11-18
Payer: COMMERCIAL

## 2024-11-18 ENCOUNTER — HOSPITAL ENCOUNTER (EMERGENCY)
Age: 39
Discharge: HOME | End: 2024-11-18
Attending: EMERGENCY MEDICINE
Payer: COMMERCIAL

## 2024-11-18 VITALS
SYSTOLIC BLOOD PRESSURE: 140 MMHG | TEMPERATURE: 97.9 F | RESPIRATION RATE: 16 BRPM | HEART RATE: 82 BPM | OXYGEN SATURATION: 100 % | DIASTOLIC BLOOD PRESSURE: 61 MMHG

## 2024-11-18 DIAGNOSIS — T88.7XXA MEDICATION SIDE EFFECT: Primary | ICD-10-CM

## 2024-11-18 DIAGNOSIS — H57.04 DILATED PUPIL: ICD-10-CM

## 2024-11-18 LAB
ALBUMIN SERPL-MCNC: 4 G/DL (ref 3.4–5)
ALBUMIN/GLOB SERPL: 1.4 {RATIO} (ref 1.1–2.2)
ALP SERPL-CCNC: 44 U/L (ref 40–129)
ALT SERPL-CCNC: 27 U/L (ref 10–40)
ANION GAP SERPL CALCULATED.3IONS-SCNC: 11 MMOL/L (ref 9–17)
AST SERPL-CCNC: 32 U/L (ref 15–37)
BASOPHILS # BLD: 0.04 K/UL
BASOPHILS NFR BLD: 1 % (ref 0–1)
BILIRUB SERPL-MCNC: 0.2 MG/DL (ref 0–1)
BILIRUB UR QL STRIP: NEGATIVE
BUN SERPL-MCNC: 8 MG/DL (ref 7–20)
CALCIUM SERPL-MCNC: 8.9 MG/DL (ref 8.3–10.6)
CHLORIDE SERPL-SCNC: 107 MMOL/L (ref 99–110)
CLARITY UR: CLEAR
CO2 SERPL-SCNC: 25 MMOL/L (ref 21–32)
COLOR UR: YELLOW
CREAT SERPL-MCNC: 0.7 MG/DL (ref 0.6–1.1)
EOSINOPHIL # BLD: 0.12 K/UL
EOSINOPHILS RELATIVE PERCENT: 2 % (ref 0–3)
EPI CELLS #/AREA URNS HPF: 1 /HPF
ERYTHROCYTE [DISTWIDTH] IN BLOOD BY AUTOMATED COUNT: 12.8 % (ref 11.7–14.9)
GFR, ESTIMATED: >90 ML/MIN/1.73M2
GLUCOSE SERPL-MCNC: 107 MG/DL (ref 74–99)
GLUCOSE UR STRIP-MCNC: NEGATIVE MG/DL
HCG UR QL: NEGATIVE
HCT VFR BLD AUTO: 40.7 % (ref 37–47)
HGB BLD-MCNC: 13.4 G/DL (ref 12.5–16)
HGB UR QL STRIP.AUTO: ABNORMAL
IMM GRANULOCYTES # BLD AUTO: 0.08 K/UL
IMM GRANULOCYTES NFR BLD: 1 %
KETONES UR STRIP-MCNC: NEGATIVE MG/DL
LEUKOCYTE ESTERASE UR QL STRIP: NEGATIVE
LYMPHOCYTES NFR BLD: 2.27 K/UL
LYMPHOCYTES RELATIVE PERCENT: 32 % (ref 24–44)
MCH RBC QN AUTO: 28.9 PG (ref 27–31)
MCHC RBC AUTO-ENTMCNC: 32.9 G/DL (ref 32–36)
MCV RBC AUTO: 87.9 FL (ref 78–100)
MONOCYTES NFR BLD: 0.56 K/UL
MONOCYTES NFR BLD: 8 % (ref 0–4)
MUCOUS THREADS URNS QL MICRO: ABNORMAL
NEUTROPHILS NFR BLD: 57 % (ref 36–66)
NEUTS SEG NFR BLD: 4.08 K/UL
NITRITE UR QL STRIP: NEGATIVE
PH UR STRIP: 6 [PH] (ref 5–8)
PLATELET # BLD AUTO: 281 K/UL (ref 140–440)
PMV BLD AUTO: 11.1 FL (ref 7.5–11.1)
POTASSIUM SERPL-SCNC: 3.4 MMOL/L (ref 3.5–5.1)
PROT SERPL-MCNC: 6.8 G/DL (ref 6.4–8.2)
PROT UR STRIP-MCNC: NEGATIVE MG/DL
RBC # BLD AUTO: 4.63 M/UL (ref 4.2–5.4)
RBC #/AREA URNS HPF: 39 /HPF (ref 0–2)
SODIUM SERPL-SCNC: 143 MMOL/L (ref 136–145)
SP GR UR STRIP: 1.01 (ref 1–1.03)
TRICHOMONAS #/AREA URNS HPF: ABNORMAL /[HPF]
UROBILINOGEN UR STRIP-ACNC: 0.2 EU/DL (ref 0–1)
WBC #/AREA URNS HPF: 2 /HPF (ref 0–5)
WBC OTHER # BLD: 7.2 K/UL (ref 4–10.5)

## 2024-11-18 PROCEDURE — 84703 CHORIONIC GONADOTROPIN ASSAY: CPT

## 2024-11-18 PROCEDURE — 6360000004 HC RX CONTRAST MEDICATION: Performed by: EMERGENCY MEDICINE

## 2024-11-18 PROCEDURE — 99285 EMERGENCY DEPT VISIT HI MDM: CPT

## 2024-11-18 PROCEDURE — 70450 CT HEAD/BRAIN W/O DYE: CPT

## 2024-11-18 PROCEDURE — 80053 COMPREHEN METABOLIC PANEL: CPT

## 2024-11-18 PROCEDURE — 85025 COMPLETE CBC W/AUTO DIFF WBC: CPT

## 2024-11-18 PROCEDURE — 6370000000 HC RX 637 (ALT 250 FOR IP): Performed by: EMERGENCY MEDICINE

## 2024-11-18 PROCEDURE — 81001 URINALYSIS AUTO W/SCOPE: CPT

## 2024-11-18 PROCEDURE — 70498 CT ANGIOGRAPHY NECK: CPT

## 2024-11-18 RX ORDER — IOPAMIDOL 755 MG/ML
70 INJECTION, SOLUTION INTRAVASCULAR
Status: COMPLETED | OUTPATIENT
Start: 2024-11-18 | End: 2024-11-18

## 2024-11-18 RX ORDER — TETRACAINE HYDROCHLORIDE 5 MG/ML
2 SOLUTION OPHTHALMIC ONCE
Status: COMPLETED | OUTPATIENT
Start: 2024-11-18 | End: 2024-11-18

## 2024-11-18 RX ADMIN — IOPAMIDOL 70 ML: 755 INJECTION, SOLUTION INTRAVENOUS at 21:08

## 2024-11-18 RX ADMIN — FLUORESCEIN SODIUM 1 MG: 1 STRIP OPHTHALMIC at 17:23

## 2024-11-18 RX ADMIN — TETRACAINE HYDROCHLORIDE 2 DROP: 5 SOLUTION OPHTHALMIC at 17:24

## 2024-11-18 ASSESSMENT — PAIN SCALES - GENERAL: PAINLEVEL_OUTOF10: 5

## 2024-11-18 ASSESSMENT — PAIN DESCRIPTION - DESCRIPTORS: DESCRIPTORS: DULL;ACHING

## 2024-11-18 ASSESSMENT — PAIN - FUNCTIONAL ASSESSMENT
PAIN_FUNCTIONAL_ASSESSMENT: 0-10
PAIN_FUNCTIONAL_ASSESSMENT: NONE - DENIES PAIN

## 2024-11-18 ASSESSMENT — PAIN DESCRIPTION - LOCATION: LOCATION: HEAD

## 2024-11-18 NOTE — ED TRIAGE NOTES
Pt arrived to the ED from home with c/o blurred vision and a headache that began this morning. Pt states that she is \"seeing weird things\" in her peripheral vision. Pt took Excedrin at 1300 for her headache. Pt states she was discharged yesterday from the hospital for a bacterial infection.

## 2024-11-18 NOTE — ED PROVIDER NOTES
[facility]    Emergency Department Encounter      Patient: Mi Powers  MRN: [id number]  : 1985  Date of Evaluation: 2024  ED Provider:  Ambika Miller DO    Triage Chief Complaint:   Eye Problem (Blurred vision) and Headache    [place]:  Mi Powers is a 39 y.o. female who  has a past medical history of Cancer (HCC), Enlarged thyroid, H/O echocardiogram, Heart abnormality, Kidney stone, Murmur, heart, and PONV (postoperative nausea and vomiting). and presents with blurry vision that is worse in left eye.  She noticed this this morning when she got up.  She had just come home from the hospital yesterday after having vomiting and diarrhea.  She thought that her  had replaced the lights because they seemed really bright.  This morning when she woke up she thought she needed to put her glasses on because her vision was blurry and she but the monitor did not resolve.  She looked herself in the mirror and noticed that her left pupil was bigger than her right.  She is very sensitive to light right now.  She has a headache at the back of her head.  She does not want any medications at this time.  She felt like she was leaning more to the right when walking.  No other symptoms.  Not pregnant.  On further review of her chart, she was given a scopolamine patch on 11/15 and it was removed yesterday.  It was on the right side of her head.        ROS - see HPI, below listed is current ROS at time of my eval:   systems reviewed and negative except as above.     Past Medical History:   Diagnosis Date    Cancer (HCC)     path report from thyroid bx- + papillary carcinoma 2016- tx with radioactive iodine and surgery\"- follows with Dr Juan    Enlarged thyroid     for bx 2016    H/O echocardiogram 2021    Left ventricular function and size is normal, EF is estimated at 55-60%. Mild mitral and tricuspid regurgitation is present    Heart abnormality     heart murmur    Kidney

## 2024-11-19 NOTE — ED PROVIDER NOTES
Addendum:    I took over the care of this patient from Dr. Miller.  Patient was seen for anisocoria with left pupil being blown.  Patient had been on scopolamine for intractable vomiting.  With Dr. Miller and I discussed the possibility of neurologic event.  She ordered a CT of the brain as well as CTA of the head and neck.  I reviewed both of them and they were unremarkable.    I went over to the patient reevaluated patient patient had no focal neurologic symptoms.    I discussed the patient did not need to follow-up with the doctors as directed during discharge.    Patient discharged in stable condition.     Jordan Hill MD  11/18/24 [id number]

## 2024-12-19 ENCOUNTER — HOSPITAL ENCOUNTER (OUTPATIENT)
Dept: INFUSION THERAPY | Age: 39
Discharge: HOME | End: 2024-12-19
Payer: COMMERCIAL

## 2024-12-19 DIAGNOSIS — D50.0 IRON DEFICIENCY ANEMIA SECONDARY TO BLOOD LOSS (CHRONIC): ICD-10-CM

## 2024-12-19 LAB
25(OH)D3 SERPL-MCNC: 35.8 NG/ML (ref 30–150)
ALBUMIN SERPL-MCNC: 4.4 G/DL (ref 3.4–5)
ALBUMIN/GLOB SERPL: 1.7 {RATIO} (ref 1.1–2.2)
ALP SERPL-CCNC: 53 U/L (ref 40–129)
ALT SERPL-CCNC: 12 U/L (ref 10–40)
ANION GAP SERPL CALCULATED.3IONS-SCNC: 13 MMOL/L (ref 9–17)
AST SERPL-CCNC: 20 U/L (ref 15–37)
BASOPHILS # BLD: 0.04 K/UL
BASOPHILS NFR BLD: 0 % (ref 0–1)
BILIRUB SERPL-MCNC: 0.6 MG/DL (ref 0–1)
BUN SERPL-MCNC: 9 MG/DL (ref 7–20)
CALCIUM SERPL-MCNC: 9.2 MG/DL (ref 8.3–10.6)
CHLORIDE SERPL-SCNC: 102 MMOL/L (ref 99–110)
CHOLEST SERPL-MCNC: 200 MG/DL (ref 125–199)
CO2 SERPL-SCNC: 25 MMOL/L (ref 21–32)
CREAT SERPL-MCNC: 0.7 MG/DL (ref 0.6–1.1)
EOSINOPHIL # BLD: 0.23 K/UL
EOSINOPHILS RELATIVE PERCENT: 2 % (ref 0–3)
ERYTHROCYTE [DISTWIDTH] IN BLOOD BY AUTOMATED COUNT: 13.4 % (ref 11.7–14.9)
EST. AVERAGE GLUCOSE BLD GHB EST-MCNC: 97 MG/DL
FERRITIN SERPL-MCNC: 304 NG/ML (ref 15–150)
FOLATE SERPL-MCNC: 8.7 NG/ML (ref 4.8–24.2)
GFR, ESTIMATED: >90 ML/MIN/1.73M2
GLUCOSE SERPL-MCNC: 102 MG/DL (ref 74–99)
HBA1C MFR BLD: 5 % (ref 4.2–6.3)
HCT VFR BLD AUTO: 39.3 % (ref 37–47)
HDLC SERPL-MCNC: 72 MG/DL
HGB BLD-MCNC: 12.7 G/DL (ref 12.5–16)
IRON SATN MFR SERPL: 76 % (ref 15–50)
IRON SERPL-MCNC: 235 UG/DL (ref 37–145)
LDLC SERPL CALC-MCNC: 115 MG/DL
LYMPHOCYTES NFR BLD: 2.74 K/UL
LYMPHOCYTES RELATIVE PERCENT: 24 % (ref 24–44)
MCH RBC QN AUTO: 29.3 PG (ref 27–31)
MCHC RBC AUTO-ENTMCNC: 32.3 G/DL (ref 32–36)
MCV RBC AUTO: 90.8 FL (ref 78–100)
MONOCYTES NFR BLD: 0.62 K/UL
MONOCYTES NFR BLD: 5 % (ref 0–4)
NEUTROPHILS NFR BLD: 69 % (ref 36–66)
NEUTS SEG NFR BLD: 7.89 K/UL
PLATELET # BLD AUTO: 276 K/UL (ref 140–440)
PMV BLD AUTO: 10.9 FL (ref 7.5–11.1)
POTASSIUM SERPL-SCNC: 4.1 MMOL/L (ref 3.5–5.1)
PROT SERPL-MCNC: 6.9 G/DL (ref 6.4–8.2)
RBC # BLD AUTO: 4.33 M/UL (ref 4.2–5.4)
SODIUM SERPL-SCNC: 139 MMOL/L (ref 136–145)
TIBC SERPL-MCNC: 310 UG/DL (ref 260–445)
TRIGL SERPL-MCNC: 63 MG/DL
UNSATURATED IRON BINDING CAPACITY: 74.9 UG/DL (ref 110–370)
VIT B12 SERPL-MCNC: 462 PG/ML (ref 211–911)
WBC OTHER # BLD: 11.5 K/UL (ref 4–10.5)

## 2024-12-19 PROCEDURE — 82607 VITAMIN B-12: CPT

## 2024-12-19 PROCEDURE — 80061 LIPID PANEL: CPT

## 2024-12-19 PROCEDURE — 83540 ASSAY OF IRON: CPT

## 2024-12-19 PROCEDURE — 80053 COMPREHEN METABOLIC PANEL: CPT

## 2024-12-19 PROCEDURE — 82306 VITAMIN D 25 HYDROXY: CPT

## 2024-12-19 PROCEDURE — 83550 IRON BINDING TEST: CPT

## 2024-12-19 PROCEDURE — 82728 ASSAY OF FERRITIN: CPT

## 2024-12-19 PROCEDURE — 36415 COLL VENOUS BLD VENIPUNCTURE: CPT

## 2024-12-19 PROCEDURE — 85025 COMPLETE CBC W/AUTO DIFF WBC: CPT

## 2024-12-19 PROCEDURE — 83036 HEMOGLOBIN GLYCOSYLATED A1C: CPT

## 2024-12-19 PROCEDURE — 82746 ASSAY OF FOLIC ACID SERUM: CPT

## 2025-01-01 NOTE — PROGRESS NOTES
Patient Name: Mi Powers  Patient : 1985  Patient MRN: [id number]     Primary Oncologist: Jovon Juan MD  Referring Provider: Ana Maria Li APRN - NP     Date of Service: 2025      Chief Complaint:   Chief Complaint   Patient presents with    Follow-up     Patient Active Problem List:     Iron deficiency anemia secondary to blood loss (chronic)     Lymphadenopathy     Malignant neoplasm of thyroid gland     HPI:   Ms. Powers is a very pleasant 39-year-old patient with medical history significant for papillary thyroid carcinoma diagnosed on October 3, 2016, status post total thyroidectomy on 2016, initially referred to me on 2017, for evaluation of cervical lymphadenopathy.      She stated that she had soft tissue neck ultrasound on 2016, for swollen neck glands.  She was found to have solid, 2.8 cm inferior right thyroid lobe nodule with microcalcifications.  Radiologist strongly recommends fine-needle aspiration.  She underwent fine-needle aspiration on 2016, and the final pathology was consistent with papillary carcinoma.      She was subsequently evaluated by Dr. Guidry and she underwent total thyroidectomy on 2016.  Final pathology revealed 2.5 cm encapsulated papillary carcinoma.  It was low-grade tumor and margins were negative.  There was no tumor capsular invasion, no lymphvascular invasion, no perineural invasion, and there was no extra thyroid extension.  No lymph nodes were submitted for evaluation (pT2 pNx pMx).      She was started with Levothyroxine since then and she has been followed closely by primary care physician.      She started noticing palpable lymphadenopathy in the bilateral neck and inguinal area.  She underwent soft tissue neck sonogram on 2016, and she was found to have prominent bilateral cervical lymph nodes, likely benign given the short axis diameter is less than 1 cm (there is a lymph

## 2025-01-02 ENCOUNTER — HOSPITAL ENCOUNTER (OUTPATIENT)
Dept: INFUSION THERAPY | Age: 40
Discharge: HOME | End: 2025-01-02
Payer: COMMERCIAL

## 2025-01-02 ENCOUNTER — OFFICE VISIT (OUTPATIENT)
Dept: ONCOLOGY | Age: 40
End: 2025-01-02
Payer: COMMERCIAL

## 2025-01-02 VITALS
HEART RATE: 97 BPM | DIASTOLIC BLOOD PRESSURE: 52 MMHG | SYSTOLIC BLOOD PRESSURE: 144 MMHG | BODY MASS INDEX: 24.88 KG/M2 | HEIGHT: 61 IN | TEMPERATURE: 97.7 F | OXYGEN SATURATION: 100 % | WEIGHT: 131.8 LBS

## 2025-01-02 DIAGNOSIS — D50.0 IRON DEFICIENCY ANEMIA SECONDARY TO BLOOD LOSS (CHRONIC): Primary | ICD-10-CM

## 2025-01-02 PROCEDURE — 1036F TOBACCO NON-USER: CPT | Performed by: INTERNAL MEDICINE

## 2025-01-02 PROCEDURE — G8427 DOCREV CUR MEDS BY ELIG CLIN: HCPCS | Performed by: INTERNAL MEDICINE

## 2025-01-02 PROCEDURE — G8420 CALC BMI NORM PARAMETERS: HCPCS | Performed by: INTERNAL MEDICINE

## 2025-01-02 PROCEDURE — 99211 OFF/OP EST MAY X REQ PHY/QHP: CPT

## 2025-01-02 PROCEDURE — 99213 OFFICE O/P EST LOW 20 MIN: CPT | Performed by: INTERNAL MEDICINE

## 2025-01-02 RX ORDER — PANTOPRAZOLE SODIUM 40 MG/1
TABLET, DELAYED RELEASE ORAL
COMMUNITY
Start: 2024-12-19

## 2025-01-02 ASSESSMENT — PATIENT HEALTH QUESTIONNAIRE - PHQ9
SUM OF ALL RESPONSES TO PHQ QUESTIONS 1-9: 0
SUM OF ALL RESPONSES TO PHQ QUESTIONS 1-9: 0
1. LITTLE INTEREST OR PLEASURE IN DOING THINGS: NOT AT ALL
2. FEELING DOWN, DEPRESSED OR HOPELESS: NOT AT ALL
SUM OF ALL RESPONSES TO PHQ9 QUESTIONS 1 & 2: 0
SUM OF ALL RESPONSES TO PHQ QUESTIONS 1-9: 0
SUM OF ALL RESPONSES TO PHQ QUESTIONS 1-9: 0

## 2025-01-02 NOTE — PROGRESS NOTES
MA Rooming Questions  Patient: Mi Powers  MRN: [id number]    Date: 1/2/2025        1. Do you have any new issues?   no         2. Do you need any refills on medications?    no    3. Have you had any imaging done since your last visit?   no    4. Have you been hospitalized or seen in the emergency room since your last visit here?   yes - ED 11/20,11/18    5. Did the patient have a depression screening completed today? Yes    PHQ-9 Total Score: 0 (1/2/2025  9:17 AM)       PHQ-9 Given to (if applicable):               PHQ-9 Score (if applicable):                     [] Positive     []  Negative              Does question #9 need addressed (if applicable)                     [] Yes    []  No               Dina Delgadillo MA

## 2025-01-16 ENCOUNTER — HOSPITAL ENCOUNTER (OUTPATIENT)
Age: 40
Discharge: HOME | End: 2025-01-16
Payer: COMMERCIAL

## 2025-01-16 LAB
T4 FREE SERPL-MCNC: 1.3 NG/DL (ref 0.9–1.8)
TSH SERPL DL<=0.05 MIU/L-ACNC: 0.37 UIU/ML (ref 0.27–4.2)

## 2025-01-16 PROCEDURE — 36415 COLL VENOUS BLD VENIPUNCTURE: CPT

## 2025-01-16 PROCEDURE — 86800 THYROGLOBULIN ANTIBODY: CPT

## 2025-01-16 PROCEDURE — 84443 ASSAY THYROID STIM HORMONE: CPT

## 2025-01-16 PROCEDURE — 86376 MICROSOMAL ANTIBODY EACH: CPT

## 2025-01-16 PROCEDURE — 84439 ASSAY OF FREE THYROXINE: CPT

## 2025-01-18 LAB
THYROGLOB AB SERPL-ACNC: <0.9 IU/ML (ref 0–4)
THYROPEROXIDASE AB SERPL-ACNC: <0.3 IU/ML (ref 0–9)

## 2025-01-22 ENCOUNTER — HOSPITAL ENCOUNTER (OUTPATIENT)
Age: 40
Discharge: HOME | End: 2025-01-22
Payer: COMMERCIAL

## 2025-01-22 PROCEDURE — 84443 ASSAY THYROID STIM HORMONE: CPT

## 2025-01-22 PROCEDURE — 36415 COLL VENOUS BLD VENIPUNCTURE: CPT

## 2025-01-22 PROCEDURE — 86800 THYROGLOBULIN ANTIBODY: CPT

## 2025-01-22 PROCEDURE — 84432 ASSAY OF THYROGLOBULIN: CPT

## 2025-01-22 PROCEDURE — 84439 ASSAY OF FREE THYROXINE: CPT

## 2025-01-23 LAB
THYROGLOB AB SERPL-ACNC: <0.9 IU/ML (ref 0–4)
THYROGLOB SERPL-MCNC: 0.2 NG/ML (ref 1.3–31.8)
THYROGLOB SERPL-MCNC: ABNORMAL NG/ML (ref 1.3–31.8)
THYROGLOBULIN AB: <15 IU/ML

## 2025-01-25 LAB
T4 FREE SERPL-MCNC: 1.3 NG/DL (ref 0.9–1.8)
TSH SERPL DL<=0.05 MIU/L-ACNC: 0.53 UIU/ML (ref 0.27–4.2)

## 2025-05-12 ENCOUNTER — HOSPITAL ENCOUNTER (OUTPATIENT)
Age: 40
Discharge: HOME | End: 2025-05-12
Payer: COMMERCIAL

## 2025-05-12 LAB
T4 FREE SERPL-MCNC: 1.2 NG/DL (ref 0.9–1.8)
TSH SERPL DL<=0.05 MIU/L-ACNC: 0.37 UIU/ML (ref 0.27–4.2)

## 2025-05-12 PROCEDURE — 84439 ASSAY OF FREE THYROXINE: CPT

## 2025-05-12 PROCEDURE — 84432 ASSAY OF THYROGLOBULIN: CPT

## 2025-05-12 PROCEDURE — 36415 COLL VENOUS BLD VENIPUNCTURE: CPT

## 2025-05-12 PROCEDURE — 84443 ASSAY THYROID STIM HORMONE: CPT

## 2025-05-16 LAB — THYROGLOB SERPL-MCNC: <0.5 NG/ML (ref 1.3–31.8)

## 2025-07-01 ENCOUNTER — TELEPHONE (OUTPATIENT)
Dept: ONCOLOGY | Age: 40
End: 2025-07-01

## 2025-07-01 NOTE — TELEPHONE ENCOUNTER
Pt left voicemail wanting to r/s her appt that is on 7/2/25 for Dr Juan. I tried to call pt back to r/s but she did not answer and her mailbox was full so I could not leave a voicemail.

## 2025-07-02 ENCOUNTER — TELEPHONE (OUTPATIENT)
Dept: ONCOLOGY | Age: 40
End: 2025-07-02

## 2025-07-02 NOTE — TELEPHONE ENCOUNTER
Called patient to reschedule their no show appointment on 7/2/25 with Dr. Juan.  Left message for patient to return call to reschedule appointment.  This MA left direct line [phone] & VM to return call to reschedule

## 2025-08-19 ENCOUNTER — HOSPITAL ENCOUNTER (OUTPATIENT)
Dept: LAB | Age: 40
Discharge: HOME | End: 2025-08-19
Payer: COMMERCIAL

## 2025-08-19 LAB
T4 FREE SERPL-MCNC: 1.5 NG/DL (ref 0.9–1.8)
TSH SERPL DL<=0.05 MIU/L-ACNC: 0.17 UIU/ML (ref 0.27–4.2)

## 2025-08-19 PROCEDURE — 36415 COLL VENOUS BLD VENIPUNCTURE: CPT

## 2025-08-19 PROCEDURE — 84443 ASSAY THYROID STIM HORMONE: CPT

## 2025-08-19 PROCEDURE — 84439 ASSAY OF FREE THYROXINE: CPT
